# Patient Record
Sex: FEMALE | Race: WHITE | NOT HISPANIC OR LATINO | Employment: FULL TIME | URBAN - METROPOLITAN AREA
[De-identification: names, ages, dates, MRNs, and addresses within clinical notes are randomized per-mention and may not be internally consistent; named-entity substitution may affect disease eponyms.]

---

## 2020-06-18 LAB
B-HCG UR QL: NEGATIVE
CTP QC/QA: YES

## 2020-06-18 PROCEDURE — 81025 URINE PREGNANCY TEST: CPT | Performed by: EMERGENCY MEDICINE

## 2020-06-18 PROCEDURE — 99285 EMERGENCY DEPT VISIT HI MDM: CPT

## 2020-06-19 ENCOUNTER — HOSPITAL ENCOUNTER (EMERGENCY)
Facility: HOSPITAL | Age: 35
Discharge: HOME | End: 2020-06-19
Attending: EMERGENCY MEDICINE
Payer: MEDICAID

## 2020-06-19 VITALS
BODY MASS INDEX: 32.14 KG/M2 | TEMPERATURE: 98 F | RESPIRATION RATE: 15 BRPM | DIASTOLIC BLOOD PRESSURE: 63 MMHG | WEIGHT: 200 LBS | OXYGEN SATURATION: 98 % | SYSTOLIC BLOOD PRESSURE: 99 MMHG | HEART RATE: 65 BPM | HEIGHT: 66 IN

## 2020-06-19 DIAGNOSIS — M25.511 ACUTE PAIN OF RIGHT SHOULDER: ICD-10-CM

## 2020-06-19 DIAGNOSIS — R52 PAIN: ICD-10-CM

## 2020-06-19 DIAGNOSIS — R07.9 CHEST PAIN: ICD-10-CM

## 2020-06-19 DIAGNOSIS — R07.9 CHEST PAIN, UNSPECIFIED TYPE: Primary | ICD-10-CM

## 2020-06-19 LAB
ALBUMIN SERPL BCP-MCNC: 3.9 G/DL (ref 3.5–5.2)
ALP SERPL-CCNC: 88 U/L (ref 55–135)
ALT SERPL W/O P-5'-P-CCNC: 22 U/L (ref 10–44)
ANION GAP SERPL CALC-SCNC: 11 MMOL/L (ref 8–16)
AST SERPL-CCNC: 23 U/L (ref 10–40)
BASOPHILS # BLD AUTO: 0.1 K/UL (ref 0–0.2)
BASOPHILS NFR BLD: 0.9 % (ref 0–1.9)
BILIRUB SERPL-MCNC: 0.4 MG/DL (ref 0.1–1)
BNP SERPL-MCNC: 17 PG/ML (ref 0–99)
BUN SERPL-MCNC: 16 MG/DL (ref 6–20)
CALCIUM SERPL-MCNC: 9.3 MG/DL (ref 8.7–10.5)
CHLORIDE SERPL-SCNC: 104 MMOL/L (ref 95–110)
CO2 SERPL-SCNC: 26 MMOL/L (ref 23–29)
CREAT SERPL-MCNC: 0.7 MG/DL (ref 0.5–1.4)
DIFFERENTIAL METHOD: ABNORMAL
EOSINOPHIL # BLD AUTO: 0.1 K/UL (ref 0–0.5)
EOSINOPHIL NFR BLD: 1 % (ref 0–8)
ERYTHROCYTE [DISTWIDTH] IN BLOOD BY AUTOMATED COUNT: 12.4 % (ref 11.5–14.5)
EST. GFR  (AFRICAN AMERICAN): >60 ML/MIN/1.73 M^2
EST. GFR  (NON AFRICAN AMERICAN): >60 ML/MIN/1.73 M^2
GLUCOSE SERPL-MCNC: 94 MG/DL (ref 70–110)
HCT VFR BLD AUTO: 41 % (ref 37–48.5)
HGB BLD-MCNC: 13.4 G/DL (ref 12–16)
IMM GRANULOCYTES # BLD AUTO: 0.05 K/UL (ref 0–0.04)
IMM GRANULOCYTES NFR BLD AUTO: 0.4 % (ref 0–0.5)
LYMPHOCYTES # BLD AUTO: 3 K/UL (ref 1–4.8)
LYMPHOCYTES NFR BLD: 25.6 % (ref 18–48)
MCH RBC QN AUTO: 31.5 PG (ref 27–31)
MCHC RBC AUTO-ENTMCNC: 32.7 G/DL (ref 32–36)
MCV RBC AUTO: 96 FL (ref 82–98)
MONOCYTES # BLD AUTO: 0.9 K/UL (ref 0.3–1)
MONOCYTES NFR BLD: 7.7 % (ref 4–15)
NEUTROPHILS # BLD AUTO: 7.5 K/UL (ref 1.8–7.7)
NEUTROPHILS NFR BLD: 64.4 % (ref 38–73)
NRBC BLD-RTO: 0 /100 WBC
PLATELET # BLD AUTO: 298 K/UL (ref 150–350)
PMV BLD AUTO: 10.4 FL (ref 9.2–12.9)
POTASSIUM SERPL-SCNC: 3.8 MMOL/L (ref 3.5–5.1)
PROT SERPL-MCNC: 7.9 G/DL (ref 6–8.4)
RBC # BLD AUTO: 4.26 M/UL (ref 4–5.4)
SODIUM SERPL-SCNC: 141 MMOL/L (ref 136–145)
TROPONIN I SERPL DL<=0.01 NG/ML-MCNC: <0.03 NG/ML
WBC # BLD AUTO: 11.57 K/UL (ref 3.9–12.7)

## 2020-06-19 PROCEDURE — 83880 ASSAY OF NATRIURETIC PEPTIDE: CPT

## 2020-06-19 PROCEDURE — 85025 COMPLETE CBC W/AUTO DIFF WBC: CPT

## 2020-06-19 PROCEDURE — 25000003 PHARM REV CODE 250: Performed by: EMERGENCY MEDICINE

## 2020-06-19 PROCEDURE — 84484 ASSAY OF TROPONIN QUANT: CPT

## 2020-06-19 PROCEDURE — 80053 COMPREHEN METABOLIC PANEL: CPT

## 2020-06-19 PROCEDURE — 93005 ELECTROCARDIOGRAM TRACING: CPT | Performed by: INTERNAL MEDICINE

## 2020-06-19 RX ORDER — METHOCARBAMOL 500 MG/1
1000 TABLET, FILM COATED ORAL
Status: COMPLETED | OUTPATIENT
Start: 2020-06-19 | End: 2020-06-19

## 2020-06-19 RX ORDER — ASPIRIN 325 MG
325 TABLET ORAL
Status: COMPLETED | OUTPATIENT
Start: 2020-06-19 | End: 2020-06-19

## 2020-06-19 RX ORDER — NAPROXEN 500 MG/1
500 TABLET ORAL 2 TIMES DAILY WITH MEALS
Qty: 30 TABLET | Refills: 0 | Status: SHIPPED | OUTPATIENT
Start: 2020-06-19 | End: 2020-12-09 | Stop reason: CLARIF

## 2020-06-19 RX ORDER — METHOCARBAMOL 500 MG/1
1000 TABLET, FILM COATED ORAL 3 TIMES DAILY
Qty: 30 TABLET | Refills: 0 | Status: SHIPPED | OUTPATIENT
Start: 2020-06-19 | End: 2020-06-24

## 2020-06-19 RX ADMIN — METHOCARBAMOL 1000 MG: 500 TABLET ORAL at 01:06

## 2020-06-19 RX ADMIN — ASPIRIN 325 MG ORAL TABLET 325 MG: 325 PILL ORAL at 01:06

## 2020-06-19 RX ADMIN — IBUPROFEN 600 MG: 200 TABLET, FILM COATED ORAL at 01:06

## 2020-06-19 NOTE — ED PROVIDER NOTES
"Encounter Date: 6/18/2020       History     Chief Complaint   Patient presents with    Chest Pain     "right upper chest pain / right shoulder pain" "hurting all day"     Shoulder Pain     35-year-old female presented emergency department with right shoulder pain and right-sided chest wall pain worse with movement which started this morning and has been hurting all day.  Patient's pain is reproducible.  Patient denies any fever chills or shortness of breath or abdominal pain or weakness or numbness.  Patient complains of right-sided chest wall along with right shoulder pain worse with movement and with palpation.  Patient rates his pain 6/10.  Patient denies any trauma.  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        Review of patient's allergies indicates:   Allergen Reactions    Adhesive     Bactrim [sulfamethoxazole-trimethoprim]      No past medical history on file.  No past surgical history on file.  No family history on file.  Social History     Tobacco Use    Smoking status: Not on file   Substance Use Topics    Alcohol use: Not on file    Drug use: Not on file     Review of Systems   Constitutional: Negative.    HENT: Negative.    Eyes: Negative.    Respiratory: Negative.    Cardiovascular: Positive for chest pain.   Gastrointestinal: Negative.    Endocrine: Negative.    Genitourinary: Negative.    Musculoskeletal: Positive for arthralgias.   Skin: Negative.    Allergic/Immunologic: Negative.    Neurological: Negative.    Hematological: Negative.    Psychiatric/Behavioral: Negative.    All other systems reviewed and are negative.      Physical Exam     Initial Vitals [06/18/20 2202]   BP Pulse Resp Temp SpO2   121/70 80 14 98.7 °F (37.1 °C) 99 %      MAP       --         Physical Exam    Nursing note and vitals reviewed.  Constitutional: She appears well-developed and well-nourished.   HENT:   Head: Normocephalic and atraumatic.   Nose: Nose normal.   Mouth/Throat: " Oropharynx is clear and moist.   Eyes: Conjunctivae and EOM are normal. Pupils are equal, round, and reactive to light.   Neck: Normal range of motion. Neck supple. No thyromegaly present. No tracheal deviation present. No JVD present.   Cardiovascular: Normal rate, regular rhythm, normal heart sounds and intact distal pulses.   No murmur heard.  Pulmonary/Chest: Breath sounds normal. No stridor. No respiratory distress. She has no wheezes. She has no rales. She exhibits tenderness.   Abdominal: Soft. Bowel sounds are normal.   Musculoskeletal: Normal range of motion. Tenderness present. No edema.      Comments: Right shoulder and right chest wall tenderness and pain reproduced with movement of right shoulder.  Extremities neurovascularly intact   Neurological: She is alert and oriented to person, place, and time. She has normal strength. No cranial nerve deficit. GCS score is 15. GCS eye subscore is 4. GCS verbal subscore is 5. GCS motor subscore is 6.   Skin: Skin is warm. Capillary refill takes less than 2 seconds.   Psychiatric: She has a normal mood and affect. Thought content normal.         ED Course   Procedures  Labs Reviewed   CBC W/ AUTO DIFFERENTIAL - Abnormal; Notable for the following components:       Result Value    Mean Corpuscular Hemoglobin 31.5 (*)     Immature Grans (Abs) 0.05 (*)     All other components within normal limits   COMPREHENSIVE METABOLIC PANEL   TROPONIN I   B-TYPE NATRIURETIC PEPTIDE   TROPONIN I   POCT URINE PREGNANCY     EKG Readings: (Independently Interpreted)   Initial Reading: No STEMI. Rhythm: Normal Sinus Rhythm. Ectopy: No Ectopy. Conduction: Normal.       Imaging Results          X-Ray Shoulder Complete 2 View Right (In process)                X-Ray Chest PA And Lateral (In process)               X-Rays:   Independently Interpreted Readings:   Other Readings:  Chest x-ray and right shoulder x-ray unremarkable    Medical Decision Making:   Differential Diagnosis:    Patient presented emergency department with right shoulder and right chest wall pain and tenderness and pain reproduced with movement.  X-ray unremarkable and screening cardiac workup negative and patient's pain consistent with musculoskeletal pain.  Patient treated with anti-inflammatory and muscle relaxant and pain improved and discharged with instructions and follow up with primary care  Clinical Tests:   Lab Tests: Reviewed  Radiological Study: Reviewed  Medical Tests: Reviewed                                 Clinical Impression:       ICD-10-CM ICD-9-CM   1. Chest pain, unspecified type  R07.9 786.50   2. Chest pain  R07.9 786.50   3. Pain  R52 780.96   4. Acute pain of right shoulder  M25.511 719.41                                Renny Oglesby MD  06/19/20 0146

## 2020-06-19 NOTE — ED NOTES
Given Naprosyn and Robaxin RX for pain.    Feeling better after meds, pain 4/10.  Amb out of ER in stable condition.  Gait steady

## 2020-06-19 NOTE — ED NOTES
Patient identifiers for Lindsay Sneed checked and correct.  LOC:  Lindsay Sneed is awake, alert, and aware of environment with an appropriate affect. She is oriented x 3 and speaking appropriately.  APPEARANCE:  She is resting comfortably and in no acute distress. She is clean and well groomed, patient's clothing is properly fastened.  SKIN:  The skin is warm and dry. She has normal skin turgor and moist mucus membranes. Skin is intact; no bruising or breakdown noted.  MUSCULOSKELETAL:  She is moving all extremities well, no obvious deformities noted. Pulses intact.   C/o pain to right shoulder and right anterior chest wall.  Pain worsens with movement.  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 Respirations are spontaneous and non-labored with normal effort and rate.  CARDIAC:  She has a normal rate and rhythm. No peripheral edema noted. Capillary refill < 3 seconds.  ABDOMEN:  No distention noted.  Soft and non-tender upon palpation.  NEUROLOGICAL:  PERRL. Facial expression is symmetrical. Hand grasps are equal bilaterally. Normal sensation in all extremities when touched with finger.  Allergies reported:    Review of patient's allergies indicates:   Allergen Reactions    Adhesive     Bactrim [sulfamethoxazole-trimethoprim]      OTHER NOTES:

## 2020-06-19 NOTE — Clinical Note
Lindsay Sneed was seen and treated in our emergency department on 6/18/2020.  She may return to work on 06/20/2020.  Light duty.  No heavy weightlifting.     If you have any questions or concerns, please don't hesitate to call.      Renny Oglesby MD

## 2020-12-09 ENCOUNTER — HOSPITAL ENCOUNTER (EMERGENCY)
Facility: HOSPITAL | Age: 35
Discharge: HOME | End: 2020-12-09
Payer: COMMERCIAL

## 2020-12-09 VITALS
WEIGHT: 190 LBS | HEART RATE: 87 BPM | RESPIRATION RATE: 16 BRPM | SYSTOLIC BLOOD PRESSURE: 124 MMHG | BODY MASS INDEX: 30.53 KG/M2 | TEMPERATURE: 98 F | DIASTOLIC BLOOD PRESSURE: 71 MMHG | HEIGHT: 66 IN | OXYGEN SATURATION: 100 %

## 2020-12-09 DIAGNOSIS — J06.9 VIRAL URI: Primary | ICD-10-CM

## 2020-12-09 LAB — GROUP A STREP, MOLECULAR: NEGATIVE

## 2020-12-09 PROCEDURE — 99283 EMERGENCY DEPT VISIT LOW MDM: CPT

## 2020-12-09 PROCEDURE — 87651 STREP A DNA AMP PROBE: CPT

## 2020-12-09 PROCEDURE — U0003 INFECTIOUS AGENT DETECTION BY NUCLEIC ACID (DNA OR RNA); SEVERE ACUTE RESPIRATORY SYNDROME CORONAVIRUS 2 (SARS-COV-2) (CORONAVIRUS DISEASE [COVID-19]), AMPLIFIED PROBE TECHNIQUE, MAKING USE OF HIGH THROUGHPUT TECHNOLOGIES AS DESCRIBED BY CMS-2020-01-R: HCPCS

## 2020-12-09 RX ORDER — BENZONATATE 100 MG/1
100 CAPSULE ORAL 3 TIMES DAILY PRN
Qty: 20 CAPSULE | Refills: 0 | Status: SHIPPED | OUTPATIENT
Start: 2020-12-09 | End: 2020-12-19

## 2020-12-10 LAB — SARS-COV-2 RNA RESP QL NAA+PROBE: NOT DETECTED

## 2020-12-10 NOTE — DISCHARGE INSTRUCTIONS
Instructions for Patients with Confirmed or Suspected COVID-19    If you are awaiting your test result, you will either be called or it will be released to the patient portal.  If you have any questions about your test, please visit [email or web address] or call our COVID-19 information line at [phone].      Instructions for non-hospitalized or discharged patients with confirmed or suspected COVID-19:      Stay home except to get medical care.   Separate yourself from other people and animals in your home.   Call ahead before visiting your doctor.   Wear a face mask.   Cover your coughs and sneezes.   Clean your hands often.   Avoid sharing personal household items.   Clean all high-touch surfaces every day.   Monitor your symptoms. Seek prompt medical attention if your illness is worsening (e.g., difficulty breathing). Before seeking care, call your healthcare provider.   If you have a medical emergency and must call 911, notify the dispatcher that you have or are being evaluated for COVID-19. If possible, put on a face mask before emergency medical services arrive.   Use the following symptom-based strategy to return to normal activity following a suspected or confirmed case of COVID-19. Continue isolation until:   At least 3 days (72 hours) have passed since recovery defined as resolution of fever without the use of fever-reducing medications and improvement in respiratory symptoms (e.g. cough, shortness of breath), and   At least 10 days have passed since the first positive test.       As one of the next steps, you will receive a call or text from the [place] Department of Health ([facility]) COVID-19 Tracing Team. See the contact information below so you know not to ignore the health departments call. It is important that you contact them back immediately so they can help.     Contact Tracer Number:  [phone]  Caller ID for most carriers: [place] Dept Health    What is contact tracing?  Contact  tracing is a process that helps identify everyone who has been in close contact with an infected person. Contact tracers let those people know they may have been exposed and guide them on next steps. Confidentiality is important for everyone; no one will be told who may have exposed them to the virus.  Your involvement is important. The more we know about where and how this virus is spreading, the better chance we have at stopping it from spreading further.  What does exposure mean?  Exposure means you have been within 6 feet for more than 15 minutes with a person who has or had COVID-19.  What kind of questions do the contact tracers ask?  A contact tracer will confirm your basic contact information including name, address, phone number, and next of kin, as well as asking about any symptoms you may have had. Theyll also ask you how you think you may have gotten sick, such as places where you may have been exposed to the virus, and people you were with. Those names will never be shared with anyone outside of that call, and will only be used to help trace and stop the spread of the virus.   I have privacy concerns. How will the state use my information?  Your privacy about your health is important. All calls are completed using call centers that use the appropriate health privacy protection measures (HIPAA compliance), meaning that your patient information is safe. No one will ever ask you any questions related to immigration status. Your health comes first.   Do I have to participate?  You do not have to participate, but we strongly encourage you to. Contact tracing can help us catch and control new outbreaks as theyre developing to keep your friends and family safe.   What if I dont hear from anyone?  If you dont receive a call within 24 hours, you can call the number above right away to inquire about your status. That line is open from 8:00 am - 8:00 p.m., 7 days a week.  Contact tracing saves lives! Together,  we have the power to beat this virus and keep our loved ones and neighbors safe.       Instructions for household members, intimate partners and caregivers in a non-healthcare setting of a patient with confirmed or suspected COVID-19:        Close contacts should monitor their health and call their healthcare provider right away if they develop symptoms suggestive of COVID-19 (e.g., fever, cough, shortness of breath).   Stay home except to get medical care. Separate yourself from other people and animals in the home.  Monitor the patients symptoms. If the patient is getting sicker, call his or her healthcare provider. If the patient has a medical emergency and you need to call 911, notify the dispatch personnel that the patient has or is being evaluated for COVID-19.   Wear a facemask when around other people such as sharing a room or vehicle and before entering a healthcare provider's office.  Cover coughs and sneezes with a tissue. Throw used tissues in a lined trash can immediately and wash hands.  Clean hands often with soap and water for at least 20 seconds or with an alcohol-based hand , rubbing hands together until they feel dry. Avoid touching your eyes, nose, and mouth with unwashed hands.  Clean all high-touch; surfaces every day, including counters, tabletops, doorknobs, bathroom fixtures, toilets, phones, keyboards, tablets, bedside tables, etc. Use a household cleaning spray or wipe according to label instructions.  Avoid sharing personal household items such as dishes, drinking glasses, cups, towels, bedding, etc. After these items are used, they should be washed thoroughly with soap and water.  Continue isolation until:  At least 3 days (72 hours) have passed since recovery defined as resolution of fever without the use of fever-reducing medications and improvement in respiratory symptoms (e.g. cough, shortness of breath), and   At least 10 days have passed since the patients first  positive test.    https://www.cdc.gov/coronavirus/2019-ncov/your-health/index.htm

## 2020-12-10 NOTE — ED NOTES
Lindsay Sneed presents to the ED with c/o sore throat, fatigue, decreased appetite, loss of taste, chills, and congestion x 6 days.   Mucous membranes are pink and moist. Skin is warm, dry and intact. Lungs are clear bilaterally, respirations are regular and unlabored. Denies SOB, cough, or rhinorrhea. BS active x4, no tenderness with palpation, abd is soft and not distended.  S1S2, capillary refill is < 2 seconds. Denies dysuria, difficulty urinating, frequency, numbness, tingling or weakness. BEN WHALEY

## 2020-12-10 NOTE — ED PROVIDER NOTES
Encounter Date: 12/9/2020    SCRIBE #1 NOTE: I, Kat Greenwood, am scribing for, and in the presence of, ELIZABET Augustin.       History     Chief Complaint   Patient presents with    Shortness of Breath     with sore throat, started Thursday       Time seen by provider: 6:40 PM on 12/09/2020    Lindsay Sneed is a 35 y.o. female who presents for an evaluation of possible COVID-19.  She complains of fatigue, intermittent chills, and sore throat. The patient states her symptoms began 6 days with with a sore throat and has had no known positive exposure. She denies being pregnant, taking daily medications, fever, SOB, or any other symptoms at this time. No pulmonary PMHx or PMHx.    The history is provided by the patient.     Review of patient's allergies indicates:   Allergen Reactions    Adhesive     Bactrim [sulfamethoxazole-trimethoprim]      History reviewed. No pertinent past medical history.  History reviewed. No pertinent surgical history.  History reviewed. No pertinent family history.  Social History     Tobacco Use    Smoking status: Not on file   Substance Use Topics    Alcohol use: Not on file    Drug use: Not on file     Review of Systems   Constitutional: Positive for chills and fatigue. Negative for activity change, appetite change and fever.   HENT: Positive for sore throat. Negative for congestion and rhinorrhea.    Respiratory: Negative for cough, chest tightness, shortness of breath and wheezing.    Cardiovascular: Negative for chest pain and palpitations.   Gastrointestinal: Negative for diarrhea, nausea and vomiting.   Musculoskeletal: Negative for arthralgias and myalgias.   Skin: Negative for rash.   Neurological: Negative for weakness, light-headedness, numbness and headaches.     Physical Exam     Initial Vitals [12/09/20 1819]   BP Pulse Resp Temp SpO2   124/71 87 16 97.9 °F (36.6 °C) 100 %      MAP       --         Physical Exam    Nursing note and vitals reviewed.  Constitutional:  She appears well-developed and well-nourished. She is cooperative.  Non-toxic appearance. She does not have a sickly appearance.   HENT:   Head: Normocephalic and atraumatic.   Right Ear: Tympanic membrane and external ear normal.   Left Ear: Tympanic membrane and external ear normal.   Nose: Nose normal.   Mouth/Throat: Uvula is midline, oropharynx is clear and moist and mucous membranes are normal. No posterior oropharyngeal edema or posterior oropharyngeal erythema.   Normal phonation.  Tolerating secretions.   Eyes: Conjunctivae and lids are normal.   Neck: Normal range of motion and full passive range of motion without pain. Neck supple.   Cardiovascular: Normal rate, regular rhythm and normal heart sounds. Exam reveals no gallop and no friction rub.    No murmur heard.  Pulmonary/Chest: Breath sounds normal. She has no wheezes. She has no rhonchi. She has no rales.   Abdominal: Normal appearance.   Neurological: She is alert.   Skin: Skin is warm, dry and intact. No rash noted.       ED Course   Procedures  Labs Reviewed   GROUP A STREP, MOLECULAR   SARS-COV-2 (COVID-19) QUALITATIVE PCR        Imaging Results    None          Medical Decision Making:   History:   Old Medical Records: I decided to obtain old medical records.  Differential Diagnosis:   Viral URI  Strep pharyngitis  Sinusitis   Clinical Tests:   Lab Tests: Reviewed and Ordered       APC / Resident Notes:   Patient is a 35 y.o. female who presents to the ED 12/10/2020 w who underwent emergent evaluation for fatigue, chills, and sore throat.  Multiple sick contacts in the home with similar symptoms.  Patient's chief concern is COVID-19 and is requesting testing.  COVID-19 test pending.  Rapid strep test negative.  I do not think strep pharyngitis. The patient appears to have a viral upper respiratory infection.  Based upon the history and physical exam the patient does not appear to have a serious bacterial infection such as pneumonia, sepsis,  otitis media, epiglottitis, bacterial sinusitis, strep pharyngitis, parapharyngeal or peritonsillar abscess, meningitis.  Patient appears very well and I have given specific return precautions to the patient and/or family members.  The patient can take over the counter medications and does not appear to need antibiotics at this time.               Scribe Attestation:   Scribe #1: I performed the above scribed service and the documentation accurately describes the services I performed. I attest to the accuracy of the note.    Attending Attestation:           Physician Attestation for Scribe:  Physician Attestation Statement for Scribe #1: I, Talita Taveras, reviewed documentation, as scribed by in my presence, and it is both accurate and complete.     Comments: I, Talita Taveras NP-C, personally performed the services described in this documentation. All medical record entries made by the scribe were at my direction and in my presence.  I have reviewed the chart and agree that the record reflects my personal performance and is accurate and complete. ELIZABET Augustin.  8:33 AM 12/10/2020                    Clinical Impression:     ICD-10-CM ICD-9-CM   1. Viral URI  J06.9 465.9                      Disposition:   Disposition: Discharged  Condition: Stable     ED Disposition Condition    Discharge Stable        ED Prescriptions     None        Follow-up Information     Follow up With Specialties Details Why Contact [facility]  In 1 week  [address] [place] [id number]  [phone]      [facility] Emergency Medicine  As needed, If symptoms worsen [address] [place] [id number] [phone]                                       Talita Taveras NP  12/10/20 0807

## 2020-12-10 NOTE — ED NOTES
Upon discharge, patient is AAOx4, no cardiac or respiratory complications. Follow up care and  Medications have been reviewed with patient and has been instructed to return to the ER if needed. Patient verbalized understanding and ambulated to the lobby without difficulty. JUDD CONTRERAS.

## 2020-12-15 ENCOUNTER — PATIENT OUTREACH (OUTPATIENT)
Dept: EMERGENCY MEDICINE | Facility: HOSPITAL | Age: 35
End: 2020-12-15

## 2020-12-22 NOTE — PROGRESS NOTES
Explained services to Patient and she declined to complete assessment.  ED Navigator to close encounter at this time.

## 2021-10-11 ENCOUNTER — HOSPITAL ENCOUNTER (EMERGENCY)
Facility: HOSPITAL | Age: 36
Discharge: HOME | End: 2021-10-11
Attending: EMERGENCY MEDICINE
Payer: COMMERCIAL

## 2021-10-11 VITALS
DIASTOLIC BLOOD PRESSURE: 67 MMHG | OXYGEN SATURATION: 99 % | HEART RATE: 83 BPM | WEIGHT: 190 LBS | SYSTOLIC BLOOD PRESSURE: 112 MMHG | HEIGHT: 66 IN | TEMPERATURE: 98 F | RESPIRATION RATE: 18 BRPM | BODY MASS INDEX: 30.53 KG/M2

## 2021-10-11 DIAGNOSIS — R11.2 NON-INTRACTABLE VOMITING WITH NAUSEA, UNSPECIFIED VOMITING TYPE: Primary | ICD-10-CM

## 2021-10-11 DIAGNOSIS — R11.0 NAUSEA: ICD-10-CM

## 2021-10-11 LAB
ALBUMIN SERPL BCP-MCNC: 3.8 G/DL (ref 3.5–5.2)
ALP SERPL-CCNC: 89 U/L (ref 55–135)
ALT SERPL W/O P-5'-P-CCNC: 15 U/L (ref 10–44)
ANION GAP SERPL CALC-SCNC: 9 MMOL/L (ref 8–16)
AST SERPL-CCNC: 15 U/L (ref 10–40)
BASOPHILS # BLD AUTO: 0.09 K/UL (ref 0–0.2)
BASOPHILS NFR BLD: 1.2 % (ref 0–1.9)
BILIRUB SERPL-MCNC: 0.4 MG/DL (ref 0.1–1)
BILIRUB UR QL STRIP: NEGATIVE
BUN SERPL-MCNC: 12 MG/DL (ref 6–20)
CALCIUM SERPL-MCNC: 9.4 MG/DL (ref 8.7–10.5)
CHLORIDE SERPL-SCNC: 107 MMOL/L (ref 95–110)
CLARITY UR: CLEAR
CO2 SERPL-SCNC: 24 MMOL/L (ref 23–29)
COLOR UR: YELLOW
CREAT SERPL-MCNC: 0.8 MG/DL (ref 0.5–1.4)
DIFFERENTIAL METHOD: ABNORMAL
EOSINOPHIL # BLD AUTO: 0.1 K/UL (ref 0–0.5)
EOSINOPHIL NFR BLD: 0.7 % (ref 0–8)
ERYTHROCYTE [DISTWIDTH] IN BLOOD BY AUTOMATED COUNT: 11.9 % (ref 11.5–14.5)
EST. GFR  (AFRICAN AMERICAN): >60 ML/MIN/1.73 M^2
EST. GFR  (NON AFRICAN AMERICAN): >60 ML/MIN/1.73 M^2
GLUCOSE SERPL-MCNC: 94 MG/DL (ref 70–110)
GLUCOSE UR QL STRIP: NEGATIVE
HCT VFR BLD AUTO: 43.5 % (ref 37–48.5)
HGB BLD-MCNC: 14.2 G/DL (ref 12–16)
HGB UR QL STRIP: ABNORMAL
IMM GRANULOCYTES # BLD AUTO: 0.03 K/UL (ref 0–0.04)
IMM GRANULOCYTES NFR BLD AUTO: 0.4 % (ref 0–0.5)
KETONES UR QL STRIP: NEGATIVE
LEUKOCYTE ESTERASE UR QL STRIP: NEGATIVE
LIPASE SERPL-CCNC: 18 U/L (ref 4–60)
LYMPHOCYTES # BLD AUTO: 1.6 K/UL (ref 1–4.8)
LYMPHOCYTES NFR BLD: 21.1 % (ref 18–48)
MCH RBC QN AUTO: 31.1 PG (ref 27–31)
MCHC RBC AUTO-ENTMCNC: 32.6 G/DL (ref 32–36)
MCV RBC AUTO: 95 FL (ref 82–98)
MONOCYTES # BLD AUTO: 0.5 K/UL (ref 0.3–1)
MONOCYTES NFR BLD: 6.6 % (ref 4–15)
NEUTROPHILS # BLD AUTO: 5.3 K/UL (ref 1.8–7.7)
NEUTROPHILS NFR BLD: 70 % (ref 38–73)
NITRITE UR QL STRIP: NEGATIVE
NRBC BLD-RTO: 0 /100 WBC
PH UR STRIP: 6 [PH] (ref 5–8)
PLATELET # BLD AUTO: 281 K/UL (ref 150–450)
PMV BLD AUTO: 10.4 FL (ref 9.2–12.9)
POTASSIUM SERPL-SCNC: 3.7 MMOL/L (ref 3.5–5.1)
PROT SERPL-MCNC: 7.5 G/DL (ref 6–8.4)
PROT UR QL STRIP: NEGATIVE
RBC # BLD AUTO: 4.56 M/UL (ref 4–5.4)
SODIUM SERPL-SCNC: 140 MMOL/L (ref 136–145)
SP GR UR STRIP: >=1.03 (ref 1–1.03)
TROPONIN I SERPL DL<=0.01 NG/ML-MCNC: <0.006 NG/ML (ref 0–0.03)
URN SPEC COLLECT METH UR: ABNORMAL
UROBILINOGEN UR STRIP-ACNC: NEGATIVE EU/DL
WBC # BLD AUTO: 7.58 K/UL (ref 3.9–12.7)

## 2021-10-11 PROCEDURE — 93010 ELECTROCARDIOGRAM REPORT: CPT | Mod: ,,, | Performed by: GENERAL PRACTICE

## 2021-10-11 PROCEDURE — 36415 COLL VENOUS BLD VENIPUNCTURE: CPT | Performed by: PHYSICIAN ASSISTANT

## 2021-10-11 PROCEDURE — 96374 THER/PROPH/DIAG INJ IV PUSH: CPT

## 2021-10-11 PROCEDURE — 63600175 PHARM REV CODE 636 W HCPCS: Performed by: PHYSICIAN ASSISTANT

## 2021-10-11 PROCEDURE — 93005 ELECTROCARDIOGRAM TRACING: CPT

## 2021-10-11 PROCEDURE — 25000003 PHARM REV CODE 250: Performed by: PHYSICIAN ASSISTANT

## 2021-10-11 PROCEDURE — 93010 EKG 12-LEAD: ICD-10-PCS | Mod: ,,, | Performed by: GENERAL PRACTICE

## 2021-10-11 PROCEDURE — 99285 EMERGENCY DEPT VISIT HI MDM: CPT | Mod: 25

## 2021-10-11 PROCEDURE — 81003 URINALYSIS AUTO W/O SCOPE: CPT | Performed by: PHYSICIAN ASSISTANT

## 2021-10-11 PROCEDURE — 84484 ASSAY OF TROPONIN QUANT: CPT | Performed by: PHYSICIAN ASSISTANT

## 2021-10-11 PROCEDURE — 80053 COMPREHEN METABOLIC PANEL: CPT | Performed by: PHYSICIAN ASSISTANT

## 2021-10-11 PROCEDURE — 83690 ASSAY OF LIPASE: CPT | Performed by: PHYSICIAN ASSISTANT

## 2021-10-11 PROCEDURE — 85025 COMPLETE CBC W/AUTO DIFF WBC: CPT | Performed by: PHYSICIAN ASSISTANT

## 2021-10-11 PROCEDURE — 96361 HYDRATE IV INFUSION ADD-ON: CPT

## 2021-10-11 RX ORDER — ONDANSETRON 2 MG/ML
4 INJECTION INTRAMUSCULAR; INTRAVENOUS
Status: COMPLETED | OUTPATIENT
Start: 2021-10-11 | End: 2021-10-11

## 2021-10-11 RX ORDER — HYDROXYZINE HYDROCHLORIDE 25 MG/1
25 TABLET, FILM COATED ORAL 3 TIMES DAILY PRN
Qty: 30 TABLET | Refills: 0 | Status: SHIPPED | OUTPATIENT
Start: 2021-10-11

## 2021-10-11 RX ORDER — PROMETHAZINE HYDROCHLORIDE 25 MG/1
25 TABLET ORAL EVERY 6 HOURS PRN
Qty: 15 TABLET | Refills: 0 | Status: SHIPPED | OUTPATIENT
Start: 2021-10-11 | End: 2023-12-15 | Stop reason: ALTCHOICE

## 2021-10-11 RX ORDER — ONDANSETRON 4 MG/1
8 TABLET, ORALLY DISINTEGRATING ORAL EVERY 6 HOURS PRN
Qty: 20 TABLET | Refills: 0 | OUTPATIENT
Start: 2021-10-11 | End: 2023-12-15

## 2021-10-11 RX ADMIN — SODIUM CHLORIDE 1000 ML: 0.9 INJECTION, SOLUTION INTRAVENOUS at 10:10

## 2021-10-11 RX ADMIN — ONDANSETRON 4 MG: 2 INJECTION INTRAMUSCULAR; INTRAVENOUS at 10:10

## 2022-01-01 ENCOUNTER — HOSPITAL ENCOUNTER (EMERGENCY)
Facility: HOSPITAL | Age: 37
Discharge: HOME | End: 2022-01-01
Attending: EMERGENCY MEDICINE
Payer: COMMERCIAL

## 2022-01-01 ENCOUNTER — NURSE TRIAGE (OUTPATIENT)
Dept: ADMINISTRATIVE | Facility: CLINIC | Age: 37
End: 2022-01-01
Payer: COMMERCIAL

## 2022-01-01 VITALS
HEART RATE: 89 BPM | OXYGEN SATURATION: 100 % | TEMPERATURE: 98 F | WEIGHT: 215 LBS | RESPIRATION RATE: 18 BRPM | HEIGHT: 66 IN | BODY MASS INDEX: 34.55 KG/M2 | SYSTOLIC BLOOD PRESSURE: 113 MMHG | DIASTOLIC BLOOD PRESSURE: 68 MMHG

## 2022-01-01 DIAGNOSIS — R09.81 SINUS CONGESTION: ICD-10-CM

## 2022-01-01 DIAGNOSIS — Z20.822 CLOSE EXPOSURE TO COVID-19 VIRUS: Primary | ICD-10-CM

## 2022-01-01 DIAGNOSIS — J02.9 SORE THROAT: ICD-10-CM

## 2022-01-01 DIAGNOSIS — H92.09 OTALGIA, UNSPECIFIED LATERALITY: ICD-10-CM

## 2022-01-01 LAB
INFLUENZA A, MOLECULAR: NEGATIVE
INFLUENZA B, MOLECULAR: NEGATIVE
SARS-COV-2 RDRP RESP QL NAA+PROBE: POSITIVE
SPECIMEN SOURCE: NORMAL

## 2022-01-01 PROCEDURE — U0002 COVID-19 LAB TEST NON-CDC: HCPCS | Performed by: FAMILY MEDICINE

## 2022-01-01 PROCEDURE — 87502 INFLUENZA DNA AMP PROBE: CPT | Performed by: FAMILY MEDICINE

## 2022-01-01 PROCEDURE — 99281 EMR DPT VST MAYX REQ PHY/QHP: CPT | Mod: S$GLB,,, | Performed by: FAMILY MEDICINE

## 2022-01-01 PROCEDURE — 99281 PR EMERGENCY DEPT VISIT,LEVEL I: ICD-10-PCS | Mod: S$GLB,,, | Performed by: FAMILY MEDICINE

## 2022-01-01 PROCEDURE — 99283 EMERGENCY DEPT VISIT LOW MDM: CPT

## 2022-01-01 NOTE — ED PROVIDER NOTES
Encounter Date: 1/1/2022       History     Chief Complaint   Patient presents with    COVID-19 Concerns     This is an evaluation of a 36 y.o. female that presents to the Emergency Department for nonproductive cough, nasal congestion, ear pain bilateral, sore throat, fever, chills and shortness of breath for 2 days.         Review of patient's allergies indicates:   Allergen Reactions    Adhesive     Bactrim [sulfamethoxazole-trimethoprim]      No past medical history on file.  No past surgical history on file.  No family history on file.     Review of Systems   Constitutional: Positive for chills, fatigue and fever. Negative for activity change, appetite change and diaphoresis.   HENT: Positive for congestion, ear pain, postnasal drip, rhinorrhea and sore throat. Negative for ear discharge, sinus pressure, sinus pain and tinnitus.    Respiratory: Positive for cough and shortness of breath. Negative for wheezing.    Gastrointestinal: Positive for nausea and vomiting.       Physical Exam     Initial Vitals [01/01/22 1312]   BP Pulse Resp Temp SpO2   113/68 89 18 98.4 °F (36.9 °C) 100 %      MAP       --         Physical Exam    Constitutional: She appears well-developed and well-nourished. No distress.   HENT:   Head: Normocephalic.   Right Ear: External ear and ear canal normal. Tympanic membrane is bulging. No middle ear effusion.   Left Ear: External ear and ear canal normal. Tympanic membrane is bulging.  No middle ear effusion.   Nose: Nose normal.   Mouth/Throat: Uvula is midline, oropharynx is clear and moist and mucous membranes are normal. No oropharyngeal exudate or posterior oropharyngeal edema.   Eyes: Conjunctivae are normal. Pupils are equal, round, and reactive to light.   Neck: Neck supple.   Normal range of motion.  Cardiovascular: Normal rate and regular rhythm.   Pulmonary/Chest: Effort normal and breath sounds normal. No respiratory distress. She has no decreased breath sounds. She has no  wheezes. She has no rhonchi. She has no rales. She exhibits no tenderness.   Musculoskeletal:         General: Normal range of motion.      Cervical back: Normal range of motion and neck supple.     Neurological: She is alert and oriented to person, place, and time.   Skin: Skin is warm and dry. Capillary refill takes less than 2 seconds.   Psychiatric: She has a normal mood and affect.         ED Course   Procedures  Labs Reviewed   INFLUENZA A & B BY MOLECULAR   SARS-COV-2 RNA AMPLIFICATION, QUAL          Imaging Results    None          Medications - No data to display  Medical Decision Making:   Initial Assessment:   My overall impression is Viral URI, suspicion for covid-19. I considered, but at this time, do not suspect OM, OE, strep pharyngitis, meningitis, pneumonia, or acute bacterial sinusitis.  Differential Diagnosis:   COVID, URI, FLU, OM, Strep, pneumonia  Clinical Tests:   Lab Tests: Ordered  ED Management:  The diagnosis, treatment plan, instructions for follow-up and reevaluation with referrals below as well as ED return precautions were discussed and understanding was verbalized. All questions or concerns have been addressed. The patient has been given appropriate covid-19 precautions and information per avs.                               Clinical Impression:   Final diagnoses:  [Z20.822] Close exposure to COVID-19 virus (Primary)  [H92.09] Otalgia, unspecified laterality  [R09.81] Sinus congestion  [J02.9] Sore throat          ED Disposition Condition    Discharge Stable        ED Prescriptions     None        Follow-up Information    None          Mary Ugalde NP  01/01/22 1477

## 2022-01-01 NOTE — TELEPHONE ENCOUNTER
"Pt upset, states she tested + for covid today & would like to know how since she is fully vaccinated. Pt informed vaccine does not mean you cannot catch virus, vaccine helps to minimize s/s if caught. Pt states she should have never been stuck then, "ok thank you have a good day", & call disconncected.     Reason for Disposition   General information question, no triage required and triager able to answer question    Protocols used: INFORMATION ONLY CALL - NO TRIAGE-A-AH      "

## 2022-01-18 ENCOUNTER — HOSPITAL ENCOUNTER (EMERGENCY)
Facility: HOSPITAL | Age: 37
Discharge: HOME | End: 2022-01-18
Attending: EMERGENCY MEDICINE
Payer: COMMERCIAL

## 2022-01-18 VITALS
SYSTOLIC BLOOD PRESSURE: 108 MMHG | RESPIRATION RATE: 18 BRPM | TEMPERATURE: 98 F | OXYGEN SATURATION: 100 % | DIASTOLIC BLOOD PRESSURE: 68 MMHG | WEIGHT: 215 LBS | BODY MASS INDEX: 34.55 KG/M2 | HEART RATE: 87 BPM | HEIGHT: 66 IN

## 2022-01-18 DIAGNOSIS — E86.0 DEHYDRATION: Primary | ICD-10-CM
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
## 2022-01-18 DIAGNOSIS — U07.1 COVID: ICD-10-CM

## 2022-01-18 LAB
ALBUMIN SERPL BCP-MCNC: 3.9 G/DL (ref 3.5–5.2)
ALP SERPL-CCNC: 89 U/L (ref 55–135)
ALT SERPL W/O P-5'-P-CCNC: 23 U/L (ref 10–44)
ANION GAP SERPL CALC-SCNC: 15 MMOL/L (ref 8–16)
AST SERPL-CCNC: 18 U/L (ref 10–40)
BASOPHILS # BLD AUTO: 0.05 K/UL (ref 0–0.2)
BASOPHILS NFR BLD: 0.6 % (ref 0–1.9)
BILIRUB SERPL-MCNC: 1 MG/DL (ref 0.1–1)
BUN SERPL-MCNC: 11 MG/DL (ref 6–20)
CALCIUM SERPL-MCNC: 9.7 MG/DL (ref 8.7–10.5)
CHLORIDE SERPL-SCNC: 104 MMOL/L (ref 95–110)
CO2 SERPL-SCNC: 23 MMOL/L (ref 23–29)
CREAT SERPL-MCNC: 0.8 MG/DL (ref 0.5–1.4)
DIFFERENTIAL METHOD: ABNORMAL
EOSINOPHIL # BLD AUTO: 0.1 K/UL (ref 0–0.5)
EOSINOPHIL NFR BLD: 0.6 % (ref 0–8)
ERYTHROCYTE [DISTWIDTH] IN BLOOD BY AUTOMATED COUNT: 11.9 % (ref 11.5–14.5)
EST. GFR  (AFRICAN AMERICAN): >60 ML/MIN/1.73 M^2
EST. GFR  (NON AFRICAN AMERICAN): >60 ML/MIN/1.73 M^2
GLUCOSE SERPL-MCNC: 87 MG/DL (ref 70–110)
HCT VFR BLD AUTO: 43.8 % (ref 37–48.5)
HGB BLD-MCNC: 14.5 G/DL (ref 12–16)
IMM GRANULOCYTES # BLD AUTO: 0.03 K/UL (ref 0–0.04)
IMM GRANULOCYTES NFR BLD AUTO: 0.3 % (ref 0–0.5)
LYMPHOCYTES # BLD AUTO: 1.6 K/UL (ref 1–4.8)
LYMPHOCYTES NFR BLD: 18.4 % (ref 18–48)
MCH RBC QN AUTO: 30.7 PG (ref 27–31)
MCHC RBC AUTO-ENTMCNC: 33.1 G/DL (ref 32–36)
MCV RBC AUTO: 93 FL (ref 82–98)
MONOCYTES # BLD AUTO: 0.6 K/UL (ref 0.3–1)
MONOCYTES NFR BLD: 6.7 % (ref 4–15)
NEUTROPHILS # BLD AUTO: 6.3 K/UL (ref 1.8–7.7)
NEUTROPHILS NFR BLD: 73.4 % (ref 38–73)
NRBC BLD-RTO: 0 /100 WBC
PLATELET # BLD AUTO: 326 K/UL (ref 150–450)
PMV BLD AUTO: 10.2 FL (ref 9.2–12.9)
POTASSIUM SERPL-SCNC: 3.7 MMOL/L (ref 3.5–5.1)
PROT SERPL-MCNC: 7.8 G/DL (ref 6–8.4)
RBC # BLD AUTO: 4.72 M/UL (ref 4–5.4)
SODIUM SERPL-SCNC: 142 MMOL/L (ref 136–145)
WBC # BLD AUTO: 8.64 K/UL (ref 3.9–12.7)

## 2022-01-18 PROCEDURE — 99284 EMERGENCY DEPT VISIT MOD MDM: CPT | Mod: 25

## 2022-01-18 PROCEDURE — 63600175 PHARM REV CODE 636 W HCPCS: Performed by: PHYSICIAN ASSISTANT

## 2022-01-18 PROCEDURE — 85025 COMPLETE CBC W/AUTO DIFF WBC: CPT | Performed by: PHYSICIAN ASSISTANT

## 2022-01-18 PROCEDURE — 96374 THER/PROPH/DIAG INJ IV PUSH: CPT

## 2022-01-18 PROCEDURE — 36415 COLL VENOUS BLD VENIPUNCTURE: CPT | Performed by: PHYSICIAN ASSISTANT

## 2022-01-18 PROCEDURE — 80053 COMPREHEN METABOLIC PANEL: CPT | Performed by: PHYSICIAN ASSISTANT

## 2022-01-18 PROCEDURE — 25000003 PHARM REV CODE 250: Performed by: PHYSICIAN ASSISTANT

## 2022-01-18 PROCEDURE — 96361 HYDRATE IV INFUSION ADD-ON: CPT

## 2022-01-18 RX ORDER — ONDANSETRON 4 MG/1
4 TABLET, ORALLY DISINTEGRATING ORAL EVERY 8 HOURS PRN
Qty: 15 TABLET | Refills: 0 | OUTPATIENT
Start: 2022-01-18 | End: 2023-12-15

## 2022-01-18 RX ORDER — ALBUTEROL SULFATE 90 UG/1
1-2 AEROSOL, METERED RESPIRATORY (INHALATION) EVERY 6 HOURS PRN
Qty: 18 G | Refills: 0 | Status: SHIPPED | OUTPATIENT
Start: 2022-01-18 | End: 2024-01-29

## 2022-01-18 RX ORDER — LIDOCAINE 50 MG/G
1 PATCH TOPICAL DAILY
Qty: 30 PATCH | Refills: 0 | Status: SHIPPED | OUTPATIENT
Start: 2022-01-18 | End: 2024-01-29

## 2022-01-18 RX ORDER — ONDANSETRON 2 MG/ML
4 INJECTION INTRAMUSCULAR; INTRAVENOUS
Status: COMPLETED | OUTPATIENT
Start: 2022-01-18 | End: 2022-01-18

## 2022-01-18 RX ORDER — CYCLOBENZAPRINE HCL 10 MG
10 TABLET ORAL 3 TIMES DAILY PRN
Qty: 15 TABLET | Refills: 0 | Status: SHIPPED | OUTPATIENT
Start: 2022-01-18 | End: 2022-01-23

## 2022-01-18 RX ORDER — ONDANSETRON 2 MG/ML
INJECTION INTRAMUSCULAR; INTRAVENOUS
Status: DISPENSED
Start: 2022-01-18 | End: 2022-01-19

## 2022-01-18 RX ADMIN — ONDANSETRON 4 MG: 2 INJECTION INTRAMUSCULAR; INTRAVENOUS at 02:01

## 2022-01-18 RX ADMIN — SODIUM CHLORIDE 1000 ML: 0.9 INJECTION, SOLUTION INTRAVENOUS at 02:01

## 2022-01-18 NOTE — ED PROVIDER NOTES
Encounter Date: 1/18/2022       History     Chief Complaint   Patient presents with    Nausea    Vomiting    Weakness    Fall     Left chest and rib pain / eccymosis      Patient is a 36-year-old female who presents the emergency room because she has persistent nausea vomiting and diarrhea.  She had diarrhea this morning and vomited once yesterday.  The patient was diagnosed with COVID approximately 19 days ago.  Her cough congestion shortness of breath have improved.  The other day she felt weak and fell on her left side and has reproducible left lateral chest wall discomfort.  She has a bruise over her left breast.  She denies any loss consciousness headache neck pain weakness numbness.  She has no recurrent episodes of syncope.        Review of patient's allergies indicates:   Allergen Reactions    Adhesive     Bactrim [sulfamethoxazole-trimethoprim]      No past medical history on file.  No past surgical history on file.  No family history on file.     Review of Systems   Constitutional: Positive for appetite change. Negative for fatigue and fever.   HENT: Negative for ear pain, rhinorrhea and sore throat.    Eyes: Negative for pain and visual disturbance.   Respiratory: Negative for cough and shortness of breath.    Cardiovascular: Negative for chest pain.   Gastrointestinal: Positive for diarrhea, nausea and vomiting. Negative for abdominal pain.   Genitourinary: Negative for difficulty urinating.   Musculoskeletal: Negative for arthralgias.   Skin: Negative for rash.   Neurological: Positive for weakness (Generalized that). Negative for numbness and headaches.   All other systems reviewed and are negative.      Physical Exam     Initial Vitals [01/18/22 1239]   BP Pulse Resp Temp SpO2   124/89 85 18 98.1 °F (36.7 °C) 98 %      MAP       --         Physical Exam    Nursing note and vitals reviewed.  Constitutional: She appears well-developed and well-nourished. No distress.   HENT:   Head: Normocephalic  and atraumatic.   Mouth/Throat: Oropharynx is clear and moist.   Eyes: Conjunctivae and EOM are normal. Pupils are equal, round, and reactive to light.   Neck: Neck supple.   Normal range of motion.  Cardiovascular: Normal rate, regular rhythm, normal heart sounds and intact distal pulses.   Pulmonary/Chest: Breath sounds normal. She exhibits tenderness (Reproducible left mid lateral chest wall tenderness.  Small bruise to the left lateral breast).   Abdominal: Abdomen is soft. Bowel sounds are normal.   Musculoskeletal:         General: No edema. Normal range of motion.      Cervical back: Normal range of motion and neck supple.     Neurological: She is alert and oriented to person, place, and time. She has normal strength. No sensory deficit. GCS score is 15. GCS eye subscore is 4. GCS verbal subscore is 5. GCS motor subscore is 6.   Skin: Skin is warm and dry. No rash noted.   Psychiatric: She has a normal mood and affect.         ED Course   Procedures  Labs Reviewed   CBC W/ AUTO DIFFERENTIAL - Abnormal; Notable for the following components:       Result Value    Gran % 73.4 (*)     All other components within normal limits   COMPREHENSIVE METABOLIC PANEL          Imaging Results          X-Ray Ribs 2 View Left (Final result)  Result time 01/18/22 13:32:06    Final result by Riley Simon Jr., MD (01/18/22 13:32:06)                 Impression:      Negative plain x-rays of the left ribs      Electronically signed by: Riley Simon MD  Date:    01/18/2022  Time:    13:32             Narrative:    EXAMINATION:  XR RIBS 2 VIEW LEFT    CLINICAL HISTORY:  Unspecified fall, initial encounter    TECHNIQUE:  Two views of the left ribs were performed.    COMPARISON:  None.    FINDINGS:  A fracture or other osseous abnormalities of the left ribs is not seen.  Underlying pleural or pulmonary abnormalities are not identified.                               X-Ray Chest PA And Lateral (Final result)  Result time  01/18/22 13:30:13    Final result by Riley Simon Jr., MD (01/18/22 13:30:13)                 Impression:      No acute abnormality.      Electronically signed by: Riley Simon MD  Date:    01/18/2022  Time:    13:30             Narrative:    EXAMINATION:  XR CHEST PA AND LATERAL    CLINICAL HISTORY:  Unspecified fall, initial encounter    TECHNIQUE:  PA and lateral views of the chest were performed.    COMPARISON:  Chest x-ray of June 19, 2020    FINDINGS:  The lungs are clear, with normal appearance of pulmonary vasculature and no pleural effusion or pneumothorax.    The cardiac silhouette is normal in size. The hilar and mediastinal contours are unremarkable.    Bones are intact.                                 Medications   ondansetron injection 4 mg (4 mg Intravenous Given 1/18/22 1402)   sodium chloride 0.9% bolus 1,000 mL (0 mLs Intravenous Stopped 1/18/22 1502)     Medical Decision Making:   ED Management:  Patient likely has some dehydration from COVID and probable rib contusion.  She is not hypoxic.  I do not think this is a pulmonary embolism.  Labs are stable.  She feels better after fluids and can take over-the-counter anti-inflammatories muscle relaxer and lidocaine patch.  Return precautions given.  Stable for discharge                      Clinical Impression:   Final diagnoses:  [W19.XXXA] Fall  [E86.0] Dehydration (Primary)  [U07.1] COVID          ED Disposition Condition    Discharge Stable        ED Prescriptions     Medication Sig Dispense Start Date End Date Auth. Provider    ondansetron (ZOFRAN-ODT) 4 MG TbDL Take 1 tablet (4 mg total) by mouth every 8 (eight) hours as needed. 15 tablet 1/18/2022  Jeffy Orellana MD    albuterol (PROVENTIL/VENTOLIN HFA) 90 mcg/actuation inhaler Inhale 1-2 puffs into the lungs every 6 (six) hours as needed for Wheezing. Rescue 18 g 1/18/2022 1/18/2023 Jeffy Orellana MD    LIDOcaine (LIDODERM) 5 % Place 1 patch onto the skin once daily. Remove &  Discard patch within 12 hours or as directed by MD 30 patch 1/18/2022  Jeffy Orellana MD    cyclobenzaprine (FLEXERIL) 10 MG tablet Take 1 tablet (10 mg total) by mouth 3 (three) times daily as needed. 15 tablet 1/18/2022 1/23/2022 Jeffy Orellana MD        Follow-up Information     Follow up With Specialties Details Why Contact Info    [facility] Emergency Dept Emergency Medicine  If symptoms worsen [address] [place] [id number] [phone]           Jeffy Orellana MD  01/18/22 4537

## 2022-01-18 NOTE — FIRST PROVIDER EVALUATION
Emergency Department TeleTriage Encounter Note      CHIEF COMPLAINT    Chief Complaint   Patient presents with    Nausea    Vomiting    Weakness    Fall     Left chest and rib pain / eccymosis        VITAL SIGNS   Initial Vitals [01/18/22 1239]   BP Pulse Resp Temp SpO2   124/89 85 18 98.1 °F (36.7 °C) 98 %      MAP       --            ALLERGIES    Review of patient's allergies indicates:   Allergen Reactions    Adhesive     Bactrim [sulfamethoxazole-trimethoprim]        PROVIDER TRIAGE NOTE    28-year-old female presents ER for evaluation of persistent nausea vomiting.  She has not been able to keep down any food or liquids.  No abdominal pain.  She also reports feeling weak and had a fall hitting the left side of her chest.  No LOC.  reports COVID positive on 01/01    Initial orders will be placed and care will be transferred to an alternate provider when patient is roomed for a full evaluation. Any additional orders and the final disposition will be determined by that provider.      ORDERS  Labs Reviewed   CBC W/ AUTO DIFFERENTIAL   COMPREHENSIVE METABOLIC PANEL       ED Orders (720h ago, onward)    Start Ordered     Status Ordering Provider    01/18/22 1300 01/18/22 1251  ondansetron injection 4 mg  ED 1 Time         Ordered ISSAC LUIS    01/18/22 1300 01/18/22 1251  sodium chloride 0.9% bolus 1,000 mL  ED 1 Time         Ordered ISSAC LUIS    01/18/22 1251 01/18/22 1251  X-Ray Ribs 2 View Left  1 time imaging         Ordered ISSAC LUIS    01/18/22 1251 01/18/22 1251  X-Ray Chest PA And Lateral  1 time imaging         Ordered ISSAC LUIS    01/18/22 1251 01/18/22 1251  Complete Blood Count (CBC)  STAT         Ordered ISSAC LUIS    01/18/22 1251 01/18/22 1251  Comprehensive Metabolic Panel (CMP)  STAT         Ordered ISSAC LUIS    01/18/22 1251 01/18/22 1251  Insert Saline lock IV  Once         Ordered ISSAC LUIS            Virtual Visit Note: The provider  triage portion of this emergency department evaluation and documentation was performed via [employer], a HIPAA-compliant telemedicine application, in concert with a tele-presenter in the room. A face to face patient evaluation with one of my colleagues will occur once the patient is placed in an emergency department room.      DISCLAIMER: This note was prepared with [email or web address] voice recognition transcription software. Garbled syntax, mangled pronouns, and other bizarre constructions may be attributed to that software system.

## 2022-01-18 NOTE — ED NOTES
Lindsay Sneed is awake, alert and oriented x 3, skin warm and dry, in NAD.  Patient is covid + on 1/1/22.  States that she has been weak and has had NVD since.  States that she fell, injuring her left ribs.    Patient identifiers for Lindsay Sneed checked and correct.  LOC:  Lindsay Sneed is awake, alert, and aware of environment with an appropriate affect. She is oriented x 3 and speaking appropriately.  APPEARANCE:  She is resting comfortably and in no acute distress. She is clean and well groomed, patient's clothing is properly fastened.  SKIN:  The skin is warm and dry. She has normal skin turgor and moist mucus membranes. Bruising to left rib and breast area.  MUSCULOSKELETAL:  She is moving all extremities well, no obvious deformities noted. Pulses intact.   RESPIRATORY:  Airway is open and patent. Respirations are spontaneous and non-labored with normal effort and rate.  cough  CARDIAC:  She has a normal rate and rhythm. No peripheral edema noted. Capillary refill < 3 seconds.  ABDOMEN:  No distention noted.  Soft and non-tender upon palpation.  NV&D  NEUROLOGICAL:  PERRL. Facial expression is symmetrical. Hand grasps are equal bilaterally. Normal sensation in all extremities when touched with finger.  Allergies reported:    Review of patient's allergies indicates:   Allergen Reactions    Adhesive     Bactrim [sulfamethoxazole-trimethoprim]      OTHER NOTES:  Lindsay Sneed is here with

## 2022-01-19 ENCOUNTER — PATIENT OUTREACH (OUTPATIENT)
Dept: EMERGENCY MEDICINE | Facility: HOSPITAL | Age: 37
End: 2022-01-19
Payer: COMMERCIAL

## 2022-01-24 ENCOUNTER — HOSPITAL ENCOUNTER (EMERGENCY)
Facility: HOSPITAL | Age: 37
Discharge: HOME | End: 2022-01-24
Attending: EMERGENCY MEDICINE
Payer: COMMERCIAL

## 2022-01-24 VITALS
RESPIRATION RATE: 14 BRPM | OXYGEN SATURATION: 100 % | WEIGHT: 215 LBS | HEIGHT: 66 IN | TEMPERATURE: 98 F | HEART RATE: 97 BPM | SYSTOLIC BLOOD PRESSURE: 110 MMHG | BODY MASS INDEX: 34.55 KG/M2 | DIASTOLIC BLOOD PRESSURE: 76 MMHG

## 2022-01-24 DIAGNOSIS — Z86.16 PERSONAL HISTORY OF COVID-19: Primary | ICD-10-CM

## 2022-01-24 DIAGNOSIS — R05.9 COUGH: ICD-10-CM

## 2022-01-24 DIAGNOSIS — J12.9 VIRAL PNEUMONIA: ICD-10-CM

## 2022-01-24 LAB — SARS-COV-2 RDRP RESP QL NAA+PROBE: NEGATIVE

## 2022-01-24 PROCEDURE — U0002 COVID-19 LAB TEST NON-CDC: HCPCS | Performed by: NURSE PRACTITIONER

## 2022-01-24 PROCEDURE — 99283 EMERGENCY DEPT VISIT LOW MDM: CPT | Mod: 25

## 2022-01-24 RX ORDER — METHOCARBAMOL 750 MG/1
750 TABLET, FILM COATED ORAL 3 TIMES DAILY PRN
Qty: 15 TABLET | Refills: 0 | Status: SHIPPED | OUTPATIENT
Start: 2022-01-24 | End: 2022-01-29

## 2022-01-24 NOTE — Clinical Note
"Lindsay "Donna Sneed was seen and treated in our emergency department on 1/24/2022.     COVID-19 is present in our communities across the state. There is limited testing for COVID at this time, so not all patients can be tested. In this situation, your employee meets the following criteria:    Lindsay Sneed has met the criteria for COVID-19 testing and has a NEGATIVE result. The employee can return to work once they are asymptomatic for 24 hours without the use of fever reducing medications (Tylenol, Motrin, etc).     If the employee is not fully vaccinated and had a close contact:  · Retest at 5 to 7 days post-exposure  · If possible, it is recommended that they quarantine for 5 days from the time of contact regardless of their test status.  · A mask should be worn post quarantine for 5 days.    If you have any questions or concerns, or if I can be of further assistance, please do not hesitate to contact me.    Sincerely,             Minoo Waters NP"

## 2022-01-24 NOTE — FIRST PROVIDER EVALUATION
Emergency Department TeleTriage Encounter Note      CHIEF COMPLAINT    Chief Complaint   Patient presents with    COVID-19 Concerns     Nausea and congestion for the past 3 weeks. Tested positive for COVID on 01/01/2022       VITAL SIGNS   Initial Vitals [01/24/22 1429]   BP Pulse Resp Temp SpO2   115/80 (!) 111 16 98.1 °F (36.7 °C) 97 %      MAP       --            ALLERGIES    Review of patient's allergies indicates:   Allergen Reactions    Adhesive     Bactrim [sulfamethoxazole-trimethoprim]        PROVIDER TRIAGE NOTE  This is a teletriage evaluation of a 37 y.o. female presenting to the ED complaining of nausea and congestion for three weeks.  Reports +COVID test result 1/1/22. States that she also has had left sided rib pain for the past several days.     Initial orders will be placed and care will be transferred to an alternate provider when patient is roomed for a full evaluation. Any additional orders and the final disposition will be determined by that provider.           ORDERS  Labs Reviewed   POCT URINE PREGNANCY       ED Orders (720h ago, onward)    Start Ordered     Status Ordering Provider    01/24/22 1456 01/24/22 1455  POCT urine pregnancy  Once         Ordered STEPH DUNN            Virtual Visit Note: The provider triage portion of this emergency department evaluation and documentation was performed via [employer], a HIPAA-compliant telemedicine application, in concert with a tele-presenter in the room. A face to face patient evaluation with one of my colleagues will occur once the patient is placed in an emergency department room.      DISCLAIMER: This note was prepared with [employer] voice recognition transcription software. Garbled syntax, mangled pronouns, and other bizarre constructions may be attributed to that software system.

## 2022-01-24 NOTE — ED PROVIDER NOTES
Encounter Date: 1/24/2022       History     Chief Complaint   Patient presents with    COVID-19 Concerns     Nausea and congestion for the past 3 weeks. Tested positive for COVID on 01/01/2022     Patient is a 37-year-old female with no significant medical history presenting to the ED for congestion and left-sided chest pain since being diagnosed with COVID on January 1st.  Patient has had multiple ED and urgent care visits since her diagnosis.  Patient states she needs a negative test return to work.  Patient states she has had continued symptoms since her diagnosis.  Patient states she took her home dose of steroids when she was diagnosed with COVID.  Patient states she was seen last week after a fall due to dizziness and was evaluated [facility].    The history is provided by the patient and medical records.     Review of patient's allergies indicates:   Allergen Reactions    Adhesive     Bactrim [sulfamethoxazole-trimethoprim]      No past medical history on file.  No past surgical history on file.  No family history on file.     Review of Systems   Constitutional: Negative for fever.   HENT: Positive for congestion. Negative for sore throat.    Respiratory: Negative for shortness of breath.    Cardiovascular: Positive for chest pain ( left chest wall).   Gastrointestinal: Positive for nausea.   Genitourinary: Negative for dysuria.   Musculoskeletal: Negative for back pain.   Skin: Negative for rash.   Neurological: Positive for weakness ( generalized).   Hematological: Does not bruise/bleed easily.   All other systems reviewed and are negative.      Physical Exam     Initial Vitals [01/24/22 1429]   BP Pulse Resp Temp SpO2   115/80 (!) 111 16 98.1 °F (36.7 °C) 97 %      MAP       --         Physical Exam    Nursing note and vitals reviewed.  Constitutional: Vital signs are normal. She appears well-developed and well-nourished. She is Obese . She is cooperative. No distress.   HENT:   Head:  Normocephalic and atraumatic.   Mouth/Throat: Uvula is midline, oropharynx is clear and moist and mucous membranes are normal.   Eyes: Conjunctivae, EOM and lids are normal. Pupils are equal, round, and reactive to light.   Neck: Trachea normal and phonation normal. Neck supple.   Normal range of motion.  Cardiovascular: Normal rate, regular rhythm and intact distal pulses.   Pulses:       Radial pulses are 2+ on the right side and 2+ on the left side.   Pulmonary/Chest: Effort normal. She exhibits tenderness. Left breast exhibits tenderness.   Reproducible left mid lateral chest wall tenderness.  Ecchymosis noted to lateral left breast.     Musculoskeletal:      Cervical back: Normal range of motion and neck supple.      Comments: Steady gait appreciated     Neurological: She is alert and oriented to person, place, and time. She has normal strength. No sensory deficit. GCS eye subscore is 4. GCS verbal subscore is 5. GCS motor subscore is 6.   Skin: Skin is warm, dry and intact. Capillary refill takes less than 2 seconds. No pallor.         ED Course   Procedures  Labs Reviewed   SARS-COV-2 RNA AMPLIFICATION, QUAL   POCT URINE PREGNANCY          Imaging Results          X-Ray Chest PA And Lateral (Final result)  Result time 01/24/22 15:22:51    Final result by Gwen Romo MD (01/24/22 15:22:51)                 Narrative:    Reason: COVID-19 Concerns (Nausea and congestion for the past 3 weeks. Tested positive for COVID on 01/01/2022)    FINDINGS:  PA and lateral chest is compared to 1/18/2022 show normal cardiomediastinal silhouette.    There is development of faint linear interstitial opacity within the left lung base. The remainder of the lungs are clear. There are no pleural effusions. Pulmonary vasculature is normal. There are degenerative changes of the spine.    IMPRESSION:  Development of faint linear interstitial opacity in the left lung base. Differential includes atelectasis versus  "pneumonia.    Electronically signed by:  Gwen Romo MD  1/24/2022 3:22 PM CST Workstation: [id number]                            X-Rays:   Independently Interpreted Readings:   Other Readings:  Reviewed by me, read by Radiology    Medications - No data to display  Medical Decision Making:   Initial Assessment:   Emergent evaluation of a 38 yo female presenting for intermittent dizziness, weakness, congestion, nausea and left chest wall pain since her COVID diagnosis on January 1st.  Patient has had multiple ED and urgent care visits for similar complaints.  Patient states she needs a negative test return to work.  Patient states she was seen last week after a fall due to weakness and prescribed Flexeril, over-the-counter anti-inflammatories and a Lidoderm patch.  Lidoderm not covered by insurance.  Patient states she has continued pain and shortness of breath.  Patient states she has not been using her albuterol due to it not working.  Patient states she is not taking her Zofran or Phenergan due to not being able tolerate.  Patient states I just want to go back to work and they will let me because it is their fault I have COVID."    On exam pt is A&Ox3. VSS. Nonfebrile and nontoxic appearing. Breath sounds clear bilaterally. Mucous membranes pink and moist. Tonsils with no redness, erythema or exudates. Abdomen soft and nontender. No rebound or guarding appreciated on exam.   BS WNL.  Pt speaking in full sentences.  Steady gait appreciated. Cap refill < 3 seconds.      Differential Diagnosis:   Differential diagnoses include but are not limited to viral URI including influenza type illness, coronavirus, bronchitis, pneumonia,  or reactive airway disease.      Independently Interpreted Test(s):   I have ordered and independently interpreted X-rays - see prior notes.  Clinical Tests:   Lab Tests: Ordered and Reviewed  Radiological Study: Ordered and Reviewed  ED Management:  Will get chest x-ray and " reassess.  I discussed this case with my supervising physician.    Discussed x-ray results with patient.  Advised this is likely viral pneumonia versus atelectasis due to recent COVID infection.  Patient advised to continue to use albuterol inhaler as prescribed.  Tylenol or ibuprofen for pain.  Will prescribe Robaxin to take during the day for left breast tenderness after fall.  Advised she can take the Flexeril at night.  Advised deep breathing exercises and patient given incentive spirometer to promote deep breathing.  Advised patient we can retest her due to work requiring a repeat negative test.  Will release results via my [facility] portal.  Patient states she has continued nausea and is unable to tolerate Zofran or Phenergan as prescribed.  Offered Phenergan suppositories and patient refused.  Patient angry that we cannot treat her COVID and improve her symptoms.  Patient advised she needs to follow up with her PCP and not ED or urgent care if symptoms continue.  Patient states well you can tell me where to be seen and my insurance covers here.  Patient advised she is correct but recommend further evaluation treatment by PCP due to no emergent symptoms and ongoing COVID symptoms.  Patient verbalized understanding of this plan of care.  All questions and concerns addressed.    Patient is hemodynamically stable, vital signs are normal. Discharge instructions given. Return to ED precautions discussed. Follow up as directed. Pt verbalized understanding of this plan.  Pt is stable for discharge.                       Clinical Impression:   Final diagnoses:  [R05.9] Cough  [Z86.16] Personal history of COVID-19 (Primary)  [J12.9] Viral pneumonia          ED Disposition Condition    Discharge Stable        ED Prescriptions     Medication Sig Dispense Start Date End Date Auth. Provider    methocarbamoL (ROBAXIN) 750 MG Tab Take 1 tablet (750 mg total) by mouth 3 (three) times daily as needed (muscle strain). 15 tablet  1/24/2022 1/29/2022 Minoo Waters NP        Follow-up Information    None          Minoo Waters NP  01/24/22 5943

## 2022-02-03 ENCOUNTER — HOSPITAL ENCOUNTER (EMERGENCY)
Facility: HOSPITAL | Age: 37
Discharge: HOME | End: 2022-02-03
Attending: EMERGENCY MEDICINE
Payer: COMMERCIAL

## 2022-02-03 VITALS
DIASTOLIC BLOOD PRESSURE: 55 MMHG | HEART RATE: 103 BPM | OXYGEN SATURATION: 99 % | BODY MASS INDEX: 34.7 KG/M2 | RESPIRATION RATE: 18 BRPM | TEMPERATURE: 98 F | SYSTOLIC BLOOD PRESSURE: 113 MMHG | WEIGHT: 215 LBS

## 2022-02-03 DIAGNOSIS — S61.309A FINGERNAIL AVULSION, INITIAL ENCOUNTER: Primary | ICD-10-CM

## 2022-02-03 DIAGNOSIS — S60.112A CONTUSION OF LEFT THUMB WITH DAMAGE TO NAIL, INITIAL ENCOUNTER: ICD-10-CM

## 2022-02-03 PROCEDURE — 25000003 PHARM REV CODE 250: Performed by: PHYSICIAN ASSISTANT

## 2022-02-03 PROCEDURE — 11760 REPAIR OF NAIL BED: CPT

## 2022-02-03 PROCEDURE — 99283 EMERGENCY DEPT VISIT LOW MDM: CPT | Mod: 25

## 2022-02-03 RX ORDER — LIDOCAINE HYDROCHLORIDE 10 MG/ML
10 INJECTION INFILTRATION; PERINEURAL
Status: COMPLETED | OUTPATIENT
Start: 2022-02-03 | End: 2022-02-03

## 2022-02-03 RX ADMIN — LIDOCAINE HYDROCHLORIDE 10 ML: 10 INJECTION, SOLUTION INFILTRATION; PERINEURAL at 01:02

## 2022-02-03 NOTE — ED PROVIDER NOTES
Encounter Date: 2/3/2022       History     Chief Complaint   Patient presents with    Hand Injury     Left thumb slammed in car door 0900 today     Lindsay Sneed is a 37 y.o. female presenting for evaluation of left thumb and fingernail pain after accidentally slamming her thumb in the car door at 9 AM.  No numbness, tingling or weakness.  She states her nail was bent backwards and lifted off the finger.     The history is provided by the patient.     Review of patient's allergies indicates:   Allergen Reactions    Adhesive     Bactrim [sulfamethoxazole-trimethoprim]      History reviewed. No pertinent past medical history.  History reviewed. No pertinent surgical history.  History reviewed. No pertinent family history.     Review of Systems   Constitutional: Negative for chills and fever.   Musculoskeletal: Positive for arthralgias, joint swelling and myalgias. Negative for back pain, neck pain and neck stiffness.   Skin: Negative for color change, pallor, rash and wound.   Neurological: Negative for weakness and numbness.   Hematological: Does not bruise/bleed easily.       Physical Exam     Initial Vitals [02/03/22 1202]   BP Pulse Resp Temp SpO2   (!) 113/55 103 18 97.5 °F (36.4 °C) 99 %      MAP       --         Physical Exam    Nursing note and vitals reviewed.  Constitutional: She appears well-developed and well-nourished. She is not diaphoretic. No distress.   HENT:   Head: Normocephalic and atraumatic.   Cardiovascular: Intact distal pulses.   Musculoskeletal:         General: Tenderness present. Normal range of motion.      Comments: TTP noted to nail of left thumb.  Partial avulsion of nail noted without evidence for nailbed laceration.  No decreased ROM, decreased strength or loss of sensation to left thumb.       Neurological: She is alert and oriented to person, place, and time. She has normal strength. No sensory deficit.   Skin: Skin is warm and dry. No rash and no abscess noted. No erythema.    Psychiatric: She has a normal mood and affect.         ED Course   Lac Repair    Date/Time: 2/3/2022 5:52 PM  Performed by: Elizabeth Fuller PA-C  Authorized by: Keshav Trejo MD     Consent:     Consent obtained:  Verbal    Consent given by:  Patient  Anesthesia:     Anesthesia method:  Nerve block    Block needle gauge:  25 G    Block anesthetic:  Lidocaine 1% w/o epi    Block injection procedure:  Anatomic landmarks identified  Laceration details:     Location:  Finger    Finger location:  L thumb    Wound length (cm): partial nail avulsion without laceration.  Pre-procedure details:     Preparation:  Patient was prepped and draped in usual sterile fashion  Treatment:     Area cleansed with:  Povidone-iodine    Amount of cleaning:  Extensive    Irrigation solution:  Sterile saline    Irrigation method:  Syringe  Skin repair:     Repair method:  Tissue adhesive (Dermabond used to keep nail intact on nailbed)  Post-procedure details:     Dressing:  Non-adherent dressing    Procedure completion:  Tolerated well, no immediate complications      Labs Reviewed - No data to display       Imaging Results          X-Ray Finger 2 or More Views Left (Final result)  Result time 02/03/22 15:01:43    Final result by Joe Diaz MD (02/03/22 15:01:43)                 Narrative:    EXAMINATION:  XR FINGER 2 OR MORE VIEWS LEFT    CLINICAL HISTORY:  left thumb injury;    TECHNIQUE:  Frontal oblique and lateral views of the left fingers with attention to the thumb.    COMPARISON:  None    FINDINGS:  No displaced fracture or traumatic malalignment.  Preserved joint spaces.  Soft tissue swelling tip of the thumb without radiopaque retained foreign body.      Electronically signed by: Joe Diaz  Date:    02/03/2022  Time:    15:01                               Medications   LIDOcaine HCL 10 mg/ml (1%) injection 10 mL (10 mLs Infiltration Given 2/3/22 1322)     Medical Decision Making:   History:   Old Medical  Records: I decided to obtain old medical records.  Old Records Summarized: records from clinic visits and records from previous admission(s).  Differential Diagnosis:   Laceration   Contusion   Fracture   Nail avulsion   Clinical Tests:   Radiological Study: Ordered and Reviewed       APC / Resident Notes:   X-rays of thumb show no acute abnormalities, fractures or dislocations.  Nail is partially avulsed, but no nailbed laceration noted.  Nail is afixed to nailbed with Dermabond.  She is discharged home to follow-up with her PCP for re-evaluation as needed.  She voices understanding and is agreeable to the plan.  She is given specific return precautions.          Attending Attestation:     Physician Attestation Statement for NP/PA:   I discussed this assessment and plan of this patient with the NP/PA, but I did not personally examine the patient. The face to face encounter was performed by the NP/PA.    Other NP/PA Attestation Additions:    History of Present Illness: 37-year-old female presents with a finger injury.    Medical Decision Making: Initial differential diagnosis included but not limited to fracture, dislocation, and laceration.  I am in agreement with the physician assistant's  assessment, treatment, and plan of care.                      Clinical Impression:   Final diagnoses:  [S61.309A] Fingernail avulsion, initial encounter (Primary)  [S60.112A] Contusion of left thumb with damage to nail, initial encounter          ED Disposition Condition    Discharge Stable        ED Prescriptions     None        Follow-up Information     Follow up With Specialties Details Why Contact [facility] Emergency Dept Emergency Medicine  As needed, If symptoms worsen [address] [place] [id number] [phone]           Elizabeth Fuller PA-C  02/03/22 1755       Keshav Trejo MD  02/03/22 1971

## 2022-02-03 NOTE — ED NOTES
Pt resting on stretcher in NAD, respirations even and unlabored. Stretcher locked and in lowest position, call bell within reach. Pt offered restroom assistance, pt states no needs at this time. Will continue to monitor and update pt on plan of care.

## 2022-02-03 NOTE — ED NOTES
Pt identifiers checked and accurate with Lindsay Sneed     Pt presents to ED with complaints of left thumb pain after slamming hand in car door this AM. Pt reports limited mobility of thumb at this time.

## 2022-02-03 NOTE — Clinical Note
"Lindsay Chule" Naren was seen and treated in our emergency department on 2/3/2022.  She may return to work on 02/04/2022.  Please allow patient to limit use of left thumb until symptoms improved.      If you have any questions or concerns, please don't hesitate to call.      Elizabeth Fuller PA-C"

## 2022-02-04 ENCOUNTER — PATIENT OUTREACH (OUTPATIENT)
Dept: EMERGENCY MEDICINE | Facility: HOSPITAL | Age: 37
End: 2022-02-04
Payer: COMMERCIAL

## 2022-02-06 ENCOUNTER — HOSPITAL ENCOUNTER (EMERGENCY)
Facility: HOSPITAL | Age: 37
Discharge: HOME | End: 2022-02-06
Attending: EMERGENCY MEDICINE
Payer: COMMERCIAL

## 2022-02-06 VITALS
TEMPERATURE: 98 F | HEIGHT: 65 IN | OXYGEN SATURATION: 100 % | RESPIRATION RATE: 16 BRPM | BODY MASS INDEX: 35.82 KG/M2 | WEIGHT: 215 LBS | DIASTOLIC BLOOD PRESSURE: 60 MMHG | SYSTOLIC BLOOD PRESSURE: 119 MMHG | HEART RATE: 78 BPM

## 2022-02-06 DIAGNOSIS — Z77.29 NATURAL GAS EXPOSURE: Primary | ICD-10-CM

## 2022-02-06 PROCEDURE — 99283 EMERGENCY DEPT VISIT LOW MDM: CPT
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
## 2022-02-07 NOTE — DISCHARGE INSTRUCTIONS
Return to emergency department if fever, vomiting, dizziness, persistent headaches or additional concerns.

## 2023-03-21 ENCOUNTER — HOSPITAL ENCOUNTER (EMERGENCY)
Facility: HOSPITAL | Age: 38
Discharge: HOME | End: 2023-03-21
Attending: EMERGENCY MEDICINE
Payer: MEDICAID

## 2023-03-21 VITALS
HEART RATE: 82 BPM | OXYGEN SATURATION: 98 % | DIASTOLIC BLOOD PRESSURE: 83 MMHG | HEIGHT: 65 IN | TEMPERATURE: 98 F | RESPIRATION RATE: 18 BRPM | BODY MASS INDEX: 35.82 KG/M2 | WEIGHT: 215 LBS | SYSTOLIC BLOOD PRESSURE: 118 MMHG

## 2023-03-21 DIAGNOSIS — A08.4 VIRAL GASTROENTERITIS: Primary | ICD-10-CM

## 2023-03-21 DIAGNOSIS — R07.9 CHEST PAIN: ICD-10-CM

## 2023-03-21 DIAGNOSIS — K21.9 GASTROESOPHAGEAL REFLUX DISEASE, UNSPECIFIED WHETHER ESOPHAGITIS PRESENT: ICD-10-CM

## 2023-03-21 LAB
ALBUMIN SERPL BCP-MCNC: 4.2 G/DL (ref 3.5–5.2)
ALP SERPL-CCNC: 86 U/L (ref 55–135)
ALT SERPL W/O P-5'-P-CCNC: 15 U/L (ref 10–44)
ANION GAP SERPL CALC-SCNC: 13 MMOL/L (ref 8–16)
AST SERPL-CCNC: 18 U/L (ref 10–40)
BASOPHILS # BLD AUTO: 0.06 K/UL (ref 0–0.2)
BASOPHILS NFR BLD: 0.6 % (ref 0–1.9)
BILIRUB SERPL-MCNC: 0.9 MG/DL (ref 0.1–1)
BNP SERPL-MCNC: 8 PG/ML (ref 0–99)
BUN SERPL-MCNC: 10 MG/DL (ref 6–20)
CALCIUM SERPL-MCNC: 9.5 MG/DL (ref 8.7–10.5)
CHLORIDE SERPL-SCNC: 107 MMOL/L (ref 95–110)
CO2 SERPL-SCNC: 22 MMOL/L (ref 23–29)
CREAT SERPL-MCNC: 0.7 MG/DL (ref 0.5–1.4)
DIFFERENTIAL METHOD: ABNORMAL
EOSINOPHIL # BLD AUTO: 0 K/UL (ref 0–0.5)
EOSINOPHIL NFR BLD: 0.3 % (ref 0–8)
ERYTHROCYTE [DISTWIDTH] IN BLOOD BY AUTOMATED COUNT: 12.5 % (ref 11.5–14.5)
EST. GFR  (NO RACE VARIABLE): >60 ML/MIN/1.73 M^2
GLUCOSE SERPL-MCNC: 84 MG/DL (ref 70–110)
HCT VFR BLD AUTO: 43.2 % (ref 37–48.5)
HGB BLD-MCNC: 14.5 G/DL (ref 12–16)
IMM GRANULOCYTES # BLD AUTO: 0.02 K/UL (ref 0–0.04)
IMM GRANULOCYTES NFR BLD AUTO: 0.2 % (ref 0–0.5)
LIPASE SERPL-CCNC: 31 U/L (ref 4–60)
LYMPHOCYTES # BLD AUTO: 2 K/UL (ref 1–4.8)
LYMPHOCYTES NFR BLD: 21.9 % (ref 18–48)
MAGNESIUM SERPL-MCNC: 1.9 MG/DL (ref 1.6–2.6)
MCH RBC QN AUTO: 32.1 PG (ref 27–31)
MCHC RBC AUTO-ENTMCNC: 33.6 G/DL (ref 32–36)
MCV RBC AUTO: 96 FL (ref 82–98)
MONOCYTES # BLD AUTO: 0.6 K/UL (ref 0.3–1)
MONOCYTES NFR BLD: 6.3 % (ref 4–15)
NEUTROPHILS # BLD AUTO: 6.6 K/UL (ref 1.8–7.7)
NEUTROPHILS NFR BLD: 70.7 % (ref 38–73)
NRBC BLD-RTO: 0 /100 WBC
PLATELET # BLD AUTO: 312 K/UL (ref 150–450)
PMV BLD AUTO: 10.4 FL (ref 9.2–12.9)
POTASSIUM SERPL-SCNC: 4 MMOL/L (ref 3.5–5.1)
PROT SERPL-MCNC: 8.1 G/DL (ref 6–8.4)
RBC # BLD AUTO: 4.52 M/UL (ref 4–5.4)
SODIUM SERPL-SCNC: 142 MMOL/L (ref 136–145)
TROPONIN I SERPL HS-MCNC: <2.3 PG/ML (ref 0–14.9)
WBC # BLD AUTO: 9.27 K/UL (ref 3.9–12.7)

## 2023-03-21 PROCEDURE — 85025 COMPLETE CBC W/AUTO DIFF WBC: CPT | Performed by: NURSE PRACTITIONER

## 2023-03-21 PROCEDURE — 93010 ELECTROCARDIOGRAM REPORT: CPT | Mod: ,,, | Performed by: GENERAL PRACTICE

## 2023-03-21 PROCEDURE — 84484 ASSAY OF TROPONIN QUANT: CPT | Performed by: NURSE PRACTITIONER

## 2023-03-21 PROCEDURE — 25000003 PHARM REV CODE 250: Performed by: NURSE PRACTITIONER

## 2023-03-21 PROCEDURE — 83690 ASSAY OF LIPASE: CPT | Performed by: NURSE PRACTITIONER

## 2023-03-21 PROCEDURE — 83735 ASSAY OF MAGNESIUM: CPT | Performed by: NURSE PRACTITIONER

## 2023-03-21 PROCEDURE — 93010 EKG 12-LEAD: ICD-10-PCS | Mod: ,,, | Performed by: GENERAL PRACTICE

## 2023-03-21 PROCEDURE — 80053 COMPREHEN METABOLIC PANEL: CPT | Performed by: NURSE PRACTITIONER

## 2023-03-21 PROCEDURE — 99284 EMERGENCY DEPT VISIT MOD MDM: CPT | Mod: 25

## 2023-03-21 PROCEDURE — 93005 ELECTROCARDIOGRAM TRACING: CPT | Performed by: GENERAL PRACTICE

## 2023-03-21 PROCEDURE — 83880 ASSAY OF NATRIURETIC PEPTIDE: CPT | Performed by: NURSE PRACTITIONER

## 2023-03-21 RX ORDER — LIDOCAINE HYDROCHLORIDE 20 MG/ML
15 SOLUTION OROPHARYNGEAL ONCE
Status: DISCONTINUED | OUTPATIENT
Start: 2023-03-21 | End: 2023-03-21 | Stop reason: HOSPADM

## 2023-03-21 RX ORDER — SUCRALFATE 1 G/10ML
1 SUSPENSION ORAL 4 TIMES DAILY PRN
Qty: 200 ML | Refills: 0 | Status: SHIPPED | OUTPATIENT
Start: 2023-03-21 | End: 2023-03-26

## 2023-03-21 RX ORDER — MAG HYDROX/ALUMINUM HYD/SIMETH 200-200-20
30 SUSPENSION, ORAL (FINAL DOSE FORM) ORAL ONCE
Status: DISCONTINUED | OUTPATIENT
Start: 2023-03-21 | End: 2023-03-21 | Stop reason: HOSPADM

## 2023-03-21 RX ORDER — ASPIRIN 325 MG
325 TABLET ORAL
Status: DISCONTINUED | OUTPATIENT
Start: 2023-03-21 | End: 2023-03-21

## 2023-03-21 RX ORDER — ONDANSETRON 4 MG/1
4 TABLET, ORALLY DISINTEGRATING ORAL
Status: COMPLETED | OUTPATIENT
Start: 2023-03-21 | End: 2023-03-21

## 2023-03-21 RX ORDER — ONDANSETRON 4 MG/1
4 TABLET, ORALLY DISINTEGRATING ORAL EVERY 8 HOURS PRN
Qty: 15 TABLET | Refills: 0 | Status: SHIPPED | OUTPATIENT
Start: 2023-03-21 | End: 2023-03-26

## 2023-03-21 RX ADMIN — ONDANSETRON 4 MG: 4 TABLET, ORALLY DISINTEGRATING ORAL at 05:03

## 2023-03-21 NOTE — DISCHARGE INSTRUCTIONS
You were seen and evaluated in the ER today.  Your workup while you were here is reassuring for no acute causes of your symptoms.  Please follow-up with your PCP as needed.  Please return to the ED for any worsening symptoms such as chest pain, shortness of breath, fever not controlled with Tylenol or ibuprofen or uncontrolled pain.      Our goal in the emergency department is to always give you outstanding care and exceptional service. You may receive a survey by mail or e-mail in the next week regarding your experience in our ED. We would greatly appreciate your completing and returning the survey. Your feedback provides us with a way to recognize our staff who give very good care and it helps us learn how to improve when your experience was below our aspiration of excellence.

## 2023-03-21 NOTE — ED PROVIDER NOTES
"Source of History:  Patient, chart    Chief complaint:  Chest Pain and Vomiting (X 3 days)      HPI:  Lindsay Sneed is a 38 y.o. female with no significant medical history presenting with vomiting for the past 4 days and upper epigastric pain for the past 2 days.  Pt denies taking anything for symptoms.  Patient denies taking for her symptoms.    This is the extent to the patients complaints today here in the emergency department.    ROS: As per HPI and below:  Constitutional: No fever.  No chills.  Eyes: No visual changes.  ENT: No sore throat. No ear pain    Cardiovascular: No chest pain.  Respiratory: No shortness of breath.  GI:  Positive for abdominal pain.  Positive for vomiting.  Genitourinary: No abnormal urination.  Neurologic: No headache. No focal weakness.  No numbness.  MSK: no back pain.  Integument: No rashes or lesions.  Hematologic: No easy bruising.  Endocrine: No excessive thirst or urination.    Review of patient's allergies indicates:   Allergen Reactions    Adhesive     Bactrim [sulfamethoxazole-trimethoprim]        PMH:  As per HPI and below:  No past medical history on file.  No past surgical history on file.         Physical Exam:    /83   Pulse 82   Temp 98 °F (36.7 °C)   Resp 18   Ht 5' 5" (1.651 m)   Wt 97.5 kg (215 lb)   SpO2 98%   BMI 35.78 kg/m²   Nursing note and vital signs reviewed.  Constitutional: No acute distress.  Nontoxic  Eyes: No conjunctival injection.  Extraocular muscles are intact.  ENT: Oropharynx clear.  Normal phonation.  Cardiovascular: Regular rate and rhythm.  No murmurs. No gallops. No rubs  Respiratory: Clear to auscultation bilaterally.  Good air movement.  No wheezes.  No rhonchi. No rales. No accessory muscle use.  Abdomen:  No tenderness to palpation to abdomen.  Abdomen is soft and nontender.  Bowel sounds within normal limits.  Musculoskeletal: Good range of motion all joints.  No deformities.  Neck supple.  No meningismus.  Skin: No rashes " seen.  Good turgor.  No abrasions.  No ecchymoses.  Neuro: alert and oriented x3,  no focal neurological deficits.  Psych: Appropriate, conversant    MDM    Emergent evaluation of a 37 yo female presenting for upper epigastric pain and vomiting.  Patient states the vomiting began 4 days ago and epigastric pain started 2 days ago.  Patient states pain radiates into her chest.  Patient states she believes that her epigastric pain is due to vomiting.  Patient denies any for her symptoms.  On exam pt is A&Ox3. VSS. Nonfebrile and nontoxic appearing.  Mucous membranes pink and moist. Breath sounds clear bilaterally.  Abdomen soft and nontender. No rebound or guarding appreciated on exam.   BS WNL.  Pt speaking in full sentences.  Steady gait appreciated. Cap refill < 3 seconds.      History Acquisition   Additional history was not acquired from other historians.    The patient's list of active medical problems, social history, medications, and allergies as documented per RN staff has been reviewed.     Differential Diagnoses   Based on available information and the initial assessment, the working differential diagnoses considered during this evaluation include but are not limited to gastritis, gastroenteritis, ischemic chest pain, GERD, hernia,  others.    I will get labs, imaging, medicated and reassessed.  I discussed his case with my supervising physician.      LABS     Labs Reviewed   CBC W/ AUTO DIFFERENTIAL - Abnormal; Notable for the following components:       Result Value    MCH 32.1 (*)     All other components within normal limits   COMPREHENSIVE METABOLIC PANEL - Abnormal; Notable for the following components:    CO2 22 (*)     All other components within normal limits   MAGNESIUM   TROPONIN I HIGH SENSITIVITY   B-TYPE NATRIURETIC PEPTIDE   LIPASE         Imaging     Imaging Results              X-Ray Chest PA And Lateral (Final result)  Result time 03/21/23 15:34:36   Procedure changed from X-Ray Chest AP  Portable     Final result by Inna Ellis IV, MD (03/21/23 15:34:36)                   Narrative:    Chest, 2 views    HISTORY: Chest pain.    Comparison 1/24/2022.    The heart and mediastinum appear unremarkable. There is no acute pulmonary vascular engorgement. The lungs are clear with no infiltrate, volume loss or pleural fluid accumulation observed.    IMPRESSION:    No acute cardiopulmonary disease.    Electronically signed by:  Inna Ellis MD  3/21/2023 3:34 PM CDT Workstation: [id number]                                    A radiology report was available for my review at the time of the encounter.    EKG        Additional Consideration   All available testing was considered during the course of this workup.  Comorbidities taken into consideration during the patient's evaluation and treatment include weight, age.    Social determinants of health were taken into consideration during development of our treatment plan.    Medications   ondansetron disintegrating tablet 4 mg (4 mg Oral Given 3/21/23 1728)      ED Course as of 03/21/23 2043   Tue Mar 21, 2023   1741 CBC unremarkable.  No leukocytosis noted.  H and H stable at 14.5 and 43.2.  CMP unremarkable.  Mag within normal limits.  BMP negative.  Lipase negative troponin negative.  Chest x-ray negative for any acute abnormalities.  Patient reassessed.  Patient advises likely due to acid reflux.  Advise will prescribe medication to help with nausea.  Patient advised to continue with bland diet.  Increase fluids.  Follow-up with PCP as needed.  Strict return to ED precautions discussed.  Patient verbalized understanding of this plan of care.  All questions and concerns addressed. [RZ]   1742 Patient is hemodynamically stable, vital signs are normal. Discharge instructions given. Return to ED precautions discussed. Follow up as directed. Pt verbalized understanding of this plan.  Pt is stable for discharge.  [RZ]      ED Course User Index  [RZ] Minoo KOHLER  ROSY Waters             CLINICAL IMPRESSION  1. Viral gastroenteritis    2. Chest pain    3. Gastroesophageal reflux disease, unspecified whether esophagitis present         ED Disposition Condition    Discharge Stable            Instruction:  I see no indication of an emergent process beyond that addressed during our encounter but have duly counseled the patient/family regarding the need for prompt follow-up as well as the indications that should prompt immediate return to the emergency room should new or worrisome developments occur.  The patient/family has been provided with verbal and printed direction regarding our final diagnosis(es) as well as instructions regarding use of OTC and/or Rx medications intended to manage the patient's aforementioned conditions including:  ED Prescriptions       Medication Sig Dispense Start Date End Date Auth. Provider    ondansetron (ZOFRAN-ODT) 4 MG TbDL Take 1 tablet (4 mg total) by mouth every 8 (eight) hours as needed (nausea). 15 tablet 3/21/2023 3/26/2023 Minoo Waters NP    sucralfate (CARAFATE) 100 mg/mL suspension Take 10 mLs (1 g total) by mouth 4 (four) times daily as needed (nausea). 200 mL 3/21/2023 3/26/2023 Minoo Waters NP          Patient has been advised of following recommended follow-up instructions:  Follow-up Information    None       The patient/family communicates understanding of all this information and all remaining questions and concerns were addressed at this time.      The patient's condition did not warrant review of the  and prescription of controlled substances.         Minoo Waters NP  03/21/23 2043

## 2023-08-02 ENCOUNTER — LAB VISIT (OUTPATIENT)
Dept: LAB | Facility: HOSPITAL | Age: 38
End: 2023-08-02
Payer: MEDICAID

## 2023-08-02 DIAGNOSIS — F33.1 MAJOR DEPRESSIVE DISORDER, RECURRENT, MODERATE: Primary | ICD-10-CM

## 2023-08-02 LAB
ALBUMIN SERPL BCP-MCNC: 3.8 G/DL (ref 3.5–5.2)
ALP SERPL-CCNC: 79 U/L (ref 55–135)
ALT SERPL W/O P-5'-P-CCNC: 14 U/L (ref 10–44)
ANION GAP SERPL CALC-SCNC: 4 MMOL/L (ref 8–16)
AST SERPL-CCNC: 16 U/L (ref 10–40)
BILIRUB SERPL-MCNC: 0.5 MG/DL (ref 0.1–1)
BUN SERPL-MCNC: 15 MG/DL (ref 6–20)
CALCIUM SERPL-MCNC: 9 MG/DL (ref 8.7–10.5)
CHLORIDE SERPL-SCNC: 108 MMOL/L (ref 95–110)
CO2 SERPL-SCNC: 28 MMOL/L (ref 23–29)
CREAT SERPL-MCNC: 0.9 MG/DL (ref 0.5–1.4)
EST. GFR  (NO RACE VARIABLE): >60 ML/MIN/1.73 M^2
GLUCOSE SERPL-MCNC: 79 MG/DL (ref 70–110)
POTASSIUM SERPL-SCNC: 3.7 MMOL/L (ref 3.5–5.1)
PROT SERPL-MCNC: 7.4 G/DL (ref 6–8.4)
SODIUM SERPL-SCNC: 140 MMOL/L (ref 136–145)

## 2023-08-02 PROCEDURE — 80053 COMPREHEN METABOLIC PANEL: CPT

## 2023-08-02 PROCEDURE — 80183 DRUG SCRN QUANT OXCARBAZEPIN: CPT

## 2023-08-06 LAB — OXCARBAZEPINE SERPL-MCNC: <1 UG/ML (ref 10–35)

## 2023-12-15 ENCOUNTER — HOSPITAL ENCOUNTER (EMERGENCY)
Facility: HOSPITAL | Age: 38
Discharge: HOME | End: 2023-12-15
Attending: EMERGENCY MEDICINE
Payer: MEDICAID

## 2023-12-15 VITALS
DIASTOLIC BLOOD PRESSURE: 68 MMHG | HEART RATE: 89 BPM | RESPIRATION RATE: 18 BRPM | SYSTOLIC BLOOD PRESSURE: 134 MMHG | TEMPERATURE: 98 F | OXYGEN SATURATION: 98 %

## 2023-12-15 DIAGNOSIS — S00.03XA CONTUSION OF SCALP, INITIAL ENCOUNTER: Primary | ICD-10-CM

## 2023-12-15 PROCEDURE — 99283 EMERGENCY DEPT VISIT LOW MDM: CPT

## 2023-12-15 RX ORDER — ONDANSETRON 4 MG/1
4 TABLET, ORALLY DISINTEGRATING ORAL EVERY 6 HOURS PRN
Qty: 20 TABLET | Refills: 0 | Status: SHIPPED | OUTPATIENT
Start: 2023-12-15 | End: 2024-01-10

## 2023-12-15 RX ORDER — ONDANSETRON 4 MG/1
4 TABLET, ORALLY DISINTEGRATING ORAL EVERY 6 HOURS PRN
Qty: 20 TABLET | Refills: 0 | Status: SHIPPED | OUTPATIENT
Start: 2023-12-15 | End: 2023-12-15

## 2023-12-15 NOTE — DISCHARGE INSTRUCTIONS

## 2023-12-15 NOTE — ED PROVIDER NOTES
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  Patient reports she was moving cart over step when pull on card contused head.  No LOC.  No use of blood thinners.  She reports having tenderness to site but no other complications at that time but has noticed gradual onset of generalized headache with intermittent nausea and photophobia.  No current nausea.  No vomiting, lightheadedness or dizziness, visual changes, numbness, focal weakness, neck pain or other injuries.  No seizure-like activity, retrograde amnesia.  No other acute complaints at this time.    The history is provided by the patient.     Review of patient's allergies indicates:   Allergen Reactions    Adhesive     Bactrim [sulfamethoxazole-trimethoprim]      History reviewed. No pertinent past medical history.  Past Surgical History:   Procedure Laterality Date    BLADDER SURGERY      due to fistula     SECTION      HYSTERECTOMY      TUBAL LIGATION       History reviewed. No pertinent family history.     Review of Systems   Eyes:  Negative for visual disturbance.   Gastrointestinal:  Positive for nausea. Negative for vomiting.   Musculoskeletal:  Negative for neck pain and neck stiffness.   Skin:  Negative for wound.   Neurological:  Positive for headaches.       Physical Exam     Initial Vitals [12/15/23 1453]   BP Pulse Resp Temp SpO2   134/68 89 18 98 °F (36.7 °C) 98 %      MAP       --         Physical Exam    Nursing note and vitals reviewed.  Constitutional: Vital signs are normal. She appears well-developed and well-nourished. She is cooperative. She does not have a sickly appearance. She does not appear ill. No distress.   HENT:   Head: Normocephalic.  Head is without raccoon's eyes and without Guardado's sign.   Contusion location to midline scalp.  Small palpable hematoma.  No other palpable deformities.  No skin changes or open wounds.  No hematotympanum   Eyes: EOM are normal.   Neck:   Normal range of motion.  Musculoskeletal:      Cervical back: Normal range of motion. No rigidity. No spinous process tenderness.     Neurological: She is alert and oriented to person, place, and time. GCS eye subscore is 4. GCS verbal subscore is 5. GCS motor subscore is 6.   Psychiatric: She has a normal mood and affect. Her speech is normal and behavior is normal.         ED Course   Procedures  Labs Reviewed - No data to display       Imaging Results    None          Medications - No data to display  Medical Decision Making  Patient presents with concern of head injury that occurred at 9:30 a.m. this morning.  She is since developed gradual onset of headache with intermittent nausea and photophobia.  No vomiting.  No neck pain or other injuries.  Afebrile with vitals WNL.  Patient otherwise very well-appearing on exam.    DDx:  Including but not limited to head contusion, concussion, migraine, tension               ED Course as of 12/15/23 1532   Fri Dec 15, 2023   1529 [place] head CT reviewed and discussed with patient.  With careful consideration, do have significantly lower concern for acute cranial or intracranial complication.  Patient does agree to defer CT.  Concussion precautions were discussed.  Patient encouraged Tylenol/Motrin as needed, close monitoring, PCP follow-up.  ED return precautions were discussed at length.  Patient states her understanding and agrees with plan. [KS]      ED Course User Index  [KS] César Abbasi PA-C                           Clinical Impression:  Final diagnoses:  [S00.03XA] Contusion of scalp, initial encounter (Primary)          ED Disposition Condition    Discharge Stable          ED Prescriptions    None       Follow-up  Information       Follow up With Specialties Details Why Contact Info    Your Doctor                 César Abbasi PA-C  12/15/23 9708

## 2023-12-15 NOTE — ED TRIAGE NOTES
"Pt arrived with c/o head injury at 9 am today.  Pt states a metal pole accidentally hit her on the top of her head.  Denies any LOC.  Pt reports "knot to top of head."  Pt currently endorses dizziness, HA, and nausea.  +photophobia.  Pt denies any other visual changes, denies taking any blood thinners.  Pt took tylenol with no relief.  Pt answering questions appropriately, speaking in complete sentences, respirations even and unlabored.  Aao x 4.    "

## 2023-12-19 ENCOUNTER — HOSPITAL ENCOUNTER (EMERGENCY)
Facility: HOSPITAL | Age: 38
Discharge: HOME | End: 2023-12-19
Attending: EMERGENCY MEDICINE
Payer: MEDICAID

## 2023-12-19 VITALS
BODY MASS INDEX: 34.99 KG/M2 | OXYGEN SATURATION: 100 % | WEIGHT: 210 LBS | SYSTOLIC BLOOD PRESSURE: 114 MMHG | HEIGHT: 65 IN | RESPIRATION RATE: 16 BRPM | TEMPERATURE: 98 F | HEART RATE: 88 BPM | DIASTOLIC BLOOD PRESSURE: 69 MMHG

## 2023-12-19 DIAGNOSIS — S00.03XA CONTUSION OF SCALP, INITIAL ENCOUNTER: ICD-10-CM

## 2023-12-19 DIAGNOSIS — R51.9 ACUTE NONINTRACTABLE HEADACHE, UNSPECIFIED HEADACHE TYPE: Primary | ICD-10-CM

## 2023-12-19 PROCEDURE — 63600175 PHARM REV CODE 636 W HCPCS

## 2023-12-19 PROCEDURE — 96375 TX/PRO/DX INJ NEW DRUG ADDON: CPT

## 2023-12-19 PROCEDURE — 99285 EMERGENCY DEPT VISIT HI MDM: CPT | Mod: 25

## 2023-12-19 PROCEDURE — 96361 HYDRATE IV INFUSION ADD-ON: CPT

## 2023-12-19 PROCEDURE — 96374 THER/PROPH/DIAG INJ IV PUSH: CPT

## 2023-12-19 PROCEDURE — 63600175 PHARM REV CODE 636 W HCPCS: Performed by: PHYSICIAN ASSISTANT

## 2023-12-19 RX ORDER — METOCLOPRAMIDE HYDROCHLORIDE 5 MG/ML
10 INJECTION INTRAMUSCULAR; INTRAVENOUS
Status: COMPLETED | OUTPATIENT
Start: 2023-12-19 | End: 2023-12-19

## 2023-12-19 RX ORDER — KETOROLAC TROMETHAMINE 30 MG/ML
15 INJECTION, SOLUTION INTRAMUSCULAR; INTRAVENOUS
Status: COMPLETED | OUTPATIENT
Start: 2023-12-19 | End: 2023-12-19

## 2023-12-19 RX ORDER — DIPHENHYDRAMINE HYDROCHLORIDE 50 MG/ML
25 INJECTION INTRAMUSCULAR; INTRAVENOUS
Status: COMPLETED | OUTPATIENT
Start: 2023-12-19 | End: 2023-12-19

## 2023-12-19 RX ORDER — KETOROLAC TROMETHAMINE 30 MG/ML
30 INJECTION, SOLUTION INTRAMUSCULAR; INTRAVENOUS
Status: DISCONTINUED | OUTPATIENT
Start: 2023-12-19 | End: 2023-12-19

## 2023-12-19 RX ADMIN — SODIUM CHLORIDE, POTASSIUM CHLORIDE, SODIUM LACTATE AND CALCIUM CHLORIDE 1000 ML: 600; 310; 30; 20 INJECTION, SOLUTION INTRAVENOUS at 01:12

## 2023-12-19 RX ADMIN — METOCLOPRAMIDE 10 MG: 5 INJECTION, SOLUTION INTRAMUSCULAR; INTRAVENOUS at 01:12

## 2023-12-19 RX ADMIN — DIPHENHYDRAMINE HYDROCHLORIDE 25 MG: 50 INJECTION, SOLUTION INTRAMUSCULAR; INTRAVENOUS at 01:12

## 2023-12-19 RX ADMIN — KETOROLAC TROMETHAMINE 15 MG: 30 INJECTION, SOLUTION INTRAMUSCULAR; INTRAVENOUS at 02:12

## 2023-12-19 NOTE — Clinical Note
"Lindsay Sneed (Nicole) was seen and treated in our emergency department on 12/19/2023.  She may return to work on 12/21/2023.       If you have any questions or concerns, please don't hesitate to call.       RN    "

## 2023-12-19 NOTE — ED PROVIDER NOTES
"Encounter Date: 2023       History     Chief Complaint   Patient presents with    Headache     HA that travels down neck to shoulders. Pt was dx with concussion on Friday after being struck in head at work.     38-year-old female returns to ED with concern of continued headache after head contusion that occurred on 12/15.  Seen in ED that day where CTs of head were deferred.  She was educated on concussion symptoms and discharged home with Zofran.  She does report Zofran has helped to manage her nausea but she continues to have headaches and photophobia.  She does admit headaches feel similar to previous migraines.  Headache is not "worst headache ever".  No visual changes, numbness, focal weakness, vomiting, nasal congestion, cough, ear pain.  No other acute complaints at this time.    The history is provided by the patient.     Review of patient's allergies indicates:   Allergen Reactions    Adhesive     Bactrim [sulfamethoxazole-trimethoprim]      No past medical history on file.  Past Surgical History:   Procedure Laterality Date    BLADDER SURGERY      due to fistula     SECTION      HYSTERECTOMY      TUBAL LIGATION       No family history on file.     Review of Systems   Constitutional:  Negative for fever.   HENT:  Negative for congestion and sore throat.    Eyes:  Positive for photophobia. Negative for visual disturbance.   Respiratory:  Negative for cough and shortness of breath.    Cardiovascular:  Negative for chest pain.   Gastrointestinal:  Negative for abdominal pain, diarrhea, nausea and vomiting.   Musculoskeletal:  Positive for neck pain. Negative for back pain and neck stiffness.   Skin:  Negative for wound.   Neurological:  Positive for headaches. Negative for dizziness, weakness, light-headedness and numbness.       Physical Exam     Initial Vitals [23 1247]   BP Pulse Resp Temp SpO2   123/76 105 18 98.4 °F (36.9 °C) 99 %      MAP       --         Physical Exam    Nursing note " and vitals reviewed.  Constitutional: She appears well-developed and well-nourished. She is cooperative. She does not have a sickly appearance. She does not appear ill. No distress.   Resting comfortably on bed, no apparent distress   HENT:   Head: Normocephalic and atraumatic. Head is without raccoon's eyes and without Guardado's sign.   Contusion location to midline scalp with no physical or palpable deformities. No wounds   Eyes: EOM are normal.   Neck: Neck supple.   Generalized tenderness reported to bilateral cervical paraspinal muscle.  No midline bony tenderness with no bony palpable step-offs.  Appropriate sensation and movement of bilateral upper extremities   Normal range of motion.  Cardiovascular:  Normal rate, regular rhythm and normal heart sounds.           Pulmonary/Chest: Effort normal and breath sounds normal.   Abdominal: Abdomen is soft.   Musculoskeletal:         General: No tenderness.      Cervical back: Normal range of motion and neck supple.     Neurological: She is alert and oriented to person, place, and time. She is not disoriented. GCS eye subscore is 4. GCS verbal subscore is 5. GCS motor subscore is 6.   Skin: Skin is warm and dry.   Psychiatric: She has a normal mood and affect. Her speech is normal and behavior is normal. Thought content normal.         ED Course   Procedures  Labs Reviewed - No data to display       Imaging Results              CT Cervical Spine Without Contrast (Final result)  Result time 12/19/23 13:56:18      Final result by Itz Moran MD (12/19/23 13:56:18)                   Impression:      No acute traumatic pathology identified      Electronically signed by: Itz Moran MD  Date:    12/19/2023  Time:    13:56               Narrative:    EXAMINATION:  CT CERVICAL SPINE WITHOUT CONTRAST    CLINICAL HISTORY:  Neck trauma, midline tenderness (Age 16-64y);    TECHNIQUE:  Low dose axial images, sagittal and coronal reformations were performed though the  cervical spine.  Contrast was not administered.    COMPARISON:  None    FINDINGS:  CT cervical spine demonstrates straightening of the usual cervical lordosis.  Body heights and disc space heights are maintained.  No fracture identified.  Incidental vertebral hemangioma is seen at C6 vertebral body.  No prevertebral soft tissue swelling or paravertebral hematoma is seen.                                       CT Head Without Contrast (Final result)  Result time 12/19/23 13:42:35      Final result by Riley Bearden Jr., MD (12/19/23 13:42:35)                   Impression:      Normal      Electronically signed by: Riley Flores Jr  Date:    12/19/2023  Time:    13:42               Narrative:    EXAMINATION:  CT HEAD WITHOUT CONTRAST    CLINICAL HISTORY:  Head trauma, moderate-severe;    TECHNIQUE:  Low dose axial images were obtained through the head.  Coronal and sagittal reformations were also performed. Contrast was not administered.    COMPARISON:  None.    FINDINGS:  The CSF spaces, brain parenchyma, and bony calvarium are intact.There is no evidence of hemorrhage, mass, or acute infarct.    The visualized paranasal sinuses are clear.                                       Medications   metoclopramide injection 10 mg (10 mg Intravenous Given 12/19/23 1324)   diphenhydrAMINE injection 25 mg (25 mg Intravenous Given 12/19/23 1324)   lactated ringers bolus 1,000 mL (1,000 mLs Intravenous New Bag 12/19/23 1324)   ketorolac injection 15 mg (15 mg Intravenous Given 12/19/23 1448)     Medical Decision Making  Patient returns ED with concern of continued headache, photophobia and intermittent nausea since head contusion that occurred 4 days ago.  No vomiting.  Afebrile with vitals WNL.  Patient otherwise in no distress on exam.    DDx:  Including but not limited to concussion, migraine, tension, cluster, headache, viral, less likely intracranial hemorrhage, skull fracture, cerebral contusion                 ED  Course as of 12/19/23 1534   Tue Dec 19, 2023   1357 CT Head Without Contrast  No acute cranial or intracranial findings noted per Radiology review [KS]   1359 CT Cervical Spine Without Contrast  No acute fractures per Radiology review [KS]   1532 Patient is reporting improvement of headache with ED intervention.  She continues remained very well-appearing with stable vitals.  Will plan to continue with conservative care.  She will continue her home Zofran as needed, Tylenol/Motrin, rest, PCP follow-up.  ED return precautions were discussed.  Patient states her understanding and agrees with plan. [KS]      ED Course User Index  [KS] César Abbasi PA-C                           Clinical Impression:  Final diagnoses:  [R51.9] Acute nonintractable headache, unspecified headache type (Primary)  [S00.03XA] Contusion of scalp, initial encounter          ED Disposition Condition    Discharge Stable          ED Prescriptions    None       Follow-up Information       Follow up With Specialties Details Why Contact Info Additional Information    Your Doctor         [facility]   [address] [place] [address], [address]  [facility] [address] [phone] Please park in [address] C or D and use Rosie lopez. Take Medical Office Bldg. elevators.             César Abbasi PA-C  12/19/23 1534

## 2023-12-19 NOTE — ED NOTES
Pt arrive from home complains of HA that travels from neck to to shoulder. Pt suffered a concussion after being hit in head with metal pole. Pt appears to be comfortable no distress noted.      APPEARANCE: Alert, oriented and in no acute distress.  CARDIAC: Normal radial pulse.   PERIPHERAL VASCULAR: peripheral pulses present. Normal cap refill. No edema. Warm to touch.    RESPIRATORY:Normal rate and effort, breath sounds clear bilaterally throughout chest. Respirations are equal and unlabored no obvious signs of distress.  GASTRO: soft, bowel sounds normal, no tenderness, no abdominal distention.  MUSC: Full ROM. No bony tenderness or soft tissue tenderness. No obvious deformity.  SKIN: Skin is warm and dry, normal skin turgor, mucous membranes moist.  NEURO: 5/5 strength major flexors/extensors bilaterally. Sensory intact to light touch bilaterally. [place] coma scale: eyes open spontaneously-4, oriented & converses-5, obeys commands-6. No neurological abnormalities.   MENTAL STATUS: awake, alert and aware of environment.  EYE: PERRL, both eyes: pupils brisk and reactive to light. Normal size.

## 2023-12-20 ENCOUNTER — TELEPHONE (OUTPATIENT)
Dept: FAMILY MEDICINE | Facility: HOSPITAL | Age: 38
End: 2023-12-20
Payer: MEDICAID

## 2023-12-20 NOTE — TELEPHONE ENCOUNTER
----- Message from Alma Valles sent at 12/20/2023 11:13 AM CST -----  Regarding: return call  Type: return Call    Who Called: pt  Would the patient rather a call back or a response via [facility]? call  Best Call Back Number: [phone]  Additional Information: pt stated she received the call but the phone hung up

## 2023-12-21 ENCOUNTER — OFFICE VISIT (OUTPATIENT)
Dept: FAMILY MEDICINE | Facility: HOSPITAL | Age: 38
End: 2023-12-21
Payer: MEDICAID

## 2023-12-21 VITALS
HEART RATE: 80 BPM | SYSTOLIC BLOOD PRESSURE: 113 MMHG | DIASTOLIC BLOOD PRESSURE: 77 MMHG | HEIGHT: 65 IN | WEIGHT: 207.44 LBS | BODY MASS INDEX: 34.56 KG/M2

## 2023-12-21 DIAGNOSIS — Z00.00 ENCOUNTER FOR MEDICAL EXAMINATION TO ESTABLISH CARE: Primary | ICD-10-CM

## 2023-12-21 DIAGNOSIS — F41.9 ANXIETY: ICD-10-CM

## 2023-12-21 DIAGNOSIS — S06.0X0D CONCUSSION WITHOUT LOSS OF CONSCIOUSNESS, SUBSEQUENT ENCOUNTER: ICD-10-CM

## 2023-12-21 DIAGNOSIS — Z12.31 SCREENING MAMMOGRAM FOR BREAST CANCER: ICD-10-CM

## 2023-12-21 PROCEDURE — 99214 OFFICE O/P EST MOD 30 MIN: CPT | Performed by: STUDENT IN AN ORGANIZED HEALTH CARE EDUCATION/TRAINING PROGRAM

## 2023-12-21 RX ORDER — DICLOFENAC SODIUM 10 MG/G
2 GEL TOPICAL 4 TIMES DAILY
Qty: 150 G | Refills: 3 | Status: SHIPPED | OUTPATIENT
Start: 2023-12-21 | End: 2024-01-29

## 2023-12-21 RX ORDER — SERTRALINE HYDROCHLORIDE 25 MG/1
25 TABLET, FILM COATED ORAL DAILY
Qty: 30 TABLET | Refills: 11 | Status: SHIPPED | OUTPATIENT
Start: 2023-12-21 | End: 2024-01-10

## 2023-12-21 NOTE — LETTER
December 21, 2023      [facility]  [address], [address]  [place] [address]  Phone: [phone]  Fax: [phone]       Patient: Lindsay Sneed   YOB: 1985  Date of Visit: 12/21/2023    To Whom It May Concern:    Nichole Sneed  was at [facility] on 12/21/2023. The patient may return to work/school on 01/02/24 with no restrictions. If you have any questions or concerns, or if I can be of further assistance, please do not hesitate to contact me.    Sincerely,    Rocky Vaughan MD

## 2023-12-21 NOTE — PROGRESS NOTES
"\[facility]\"" Family Medicine  History & Physical    SUBJECTIVE:     Chief Complaint:   Chief Complaint   Patient presents with    Follow-up     ED    Establish Care     Head injury 6 days ago.  Hit in the head with a metal pole.   Went to ED Friday, back on Tuesday.        History of Present Illness:  38 y.o. female who  has no past medical history on file. presents to clinic today to establish care. She endorses hx of previously tx'd Anxiety, OCD, PTSD, and PCOS, but is presenting today for follow up of concussion dx in ED following incident on 12/15/23. She states while at work at the [employer]. While trying to maneuver a cart back up a few stairs into the hotel, she pressed down on the cart which fell back onto her and struck her head. Immediately had no complication or LoC; however, after a few hours she began to notice HA and nausea which prompted her to be eval'd in the ER. She unfortunately left prior to full evaluation with imaging 2/2 her anxiety. She returned to the ED on 12/19 and had further eval with CT of her head and C-spine which only revealed an incidental hematoma at c-6 but had otherwise unremarkable w/u. Currently alternating tylenol and ibuprofen for HA which has improved and is having improved nausea and photophobia. Denies any additional sx's including numbness, tingling, weakness, amnesia, or LoC. Pt has additional concern regarding her anxiety at this time. Reports prolonged hx under care of Psychiatrist related to issues stemming from situations leading to PTSD. Endorses treatment included "multiple pills" but is uncertain which though reports most benefit from xanax monotherapy. Anxiety manifested in increased worry, decreased appetite, and sleep. She denies any concern for current safety as well as SI/HI. Denies any current tobacco, EtOH, or substance use. She is interested in referral for Psychiatry/Psychology as well as restarting medication.    Allergies:  Review of patient's allergies " indicates:   Allergen Reactions    Adhesive     Bactrim [sulfamethoxazole-trimethoprim]        Home Medications:  Current Outpatient Medications on File Prior to Visit   Medication Sig    ondansetron (ZOFRAN-ODT) 4 MG TbDL Take 1 tablet (4 mg total) by mouth every 6 (six) hours as needed (nausea).    albuterol (PROVENTIL/VENTOLIN HFA) 90 mcg/actuation inhaler Inhale 1-2 puffs into the lungs every 6 (six) hours as needed for Wheezing. Rescue    hydrOXYzine HCL (ATARAX) 25 MG tablet Take 1 tablet (25 mg total) by mouth 3 (three) times daily as needed for Anxiety. (Patient not taking: Reported on 2023)    LIDOcaine (LIDODERM) 5 % Place 1 patch onto the skin once daily. Remove & Discard patch within 12 hours or as directed by MD (Patient not taking: Reported on 2023)     No current facility-administered medications on file prior to visit.       No past medical history on file.  Past Surgical History:   Procedure Laterality Date    BLADDER SURGERY      due to fistula     SECTION      HYSTERECTOMY      TUBAL LIGATION       No family history on file.  Social History     Tobacco Use    Smoking status: Former     Types: Cigarettes    Smokeless tobacco: Never        Review of Systems   Constitutional:  Negative for chills, fever and malaise/fatigue.   HENT:  Negative for congestion, sinus pain and sore throat.    Eyes:  Positive for photophobia. Negative for blurred vision, double vision and pain.   Respiratory:  Negative for cough, sputum production and shortness of breath.    Cardiovascular:  Negative for chest pain, palpitations and leg swelling.   Gastrointestinal:  Positive for nausea. Negative for abdominal pain, constipation, diarrhea and vomiting.   Genitourinary:  Negative for dysuria and hematuria.   Musculoskeletal:  Negative for myalgias.   Skin:  Negative for rash.   Neurological:  Positive for headaches. Negative for dizziness, tingling, tremors, sensory change, speech change, focal weakness,  "loss of consciousness and weakness.   Psychiatric/Behavioral:  The patient is nervous/anxious.         OBJECTIVE:     Vital Signs:  Pulse: 80 (12/21/23 0921)  BP: 113/77 (12/21/23 0921)    Physical Exam  Vitals reviewed.   Constitutional:       General: She is not in acute distress.  HENT:      Head: Normocephalic and atraumatic.      Comments: Minimal non-tender area of edema noted over scalp but no laceration     Right Ear: External ear normal.      Left Ear: External ear normal.      Nose: Nose normal.   Eyes:      Conjunctiva/sclera: Conjunctivae normal.      Pupils: Pupils are equal, round, and reactive to light.   Cardiovascular:      Rate and Rhythm: Normal rate and regular rhythm.      Pulses: Normal pulses.      Heart sounds: Normal heart sounds. No murmur heard.     No friction rub. No gallop.   Pulmonary:      Effort: Pulmonary effort is normal.      Breath sounds: Normal breath sounds. No wheezing, rhonchi or rales.   Abdominal:      General: Bowel sounds are normal.      Palpations: There is no mass.      Tenderness: There is no abdominal tenderness.   Musculoskeletal:         General: No swelling or tenderness. Normal range of motion.      Cervical back: Normal range of motion.   Lymphadenopathy:      Cervical: No cervical adenopathy.   Skin:     General: Skin is warm and dry.      Findings: No rash.   Neurological:      General: No focal deficit present.      Mental Status: She is alert and oriented to person, place, and time.      Cranial Nerves: No cranial nerve deficit.      Sensory: No sensory deficit.      Motor: No weakness.      Coordination: Coordination normal.      Gait: Gait normal.      Deep Tendon Reflexes: Reflexes normal.         Laboratory:  No results found for: "HGBA1C"    A/P:  Lindsay was seen today for follow-up and establish care.Discussed recent incident resulting in concussion and encouraged cnt sx monitoring and rest which has resulted in improvement of sx thus far. Will " provide pt with voltaren for residual neck pain as well as work excuse provided for missed time. Also discussed hx of mental health disorders and tx options moving forward. GAD7= 18. Pt amenable to referral for Psych and counseling as well as starting medication at this time after risk/benefits discussed. Will complete screening blood work at this time and send referral for mammogram as well as OBGYN as pt would prefer to follow with specialist. Encouraged to f/u as needed but to return in 6wk for medication adjustment.     Diagnoses and all orders for this visit:    Encounter for medical examination to establish care  -     CBC Auto Differential; Future  -     Comprehensive Metabolic Panel; Future  -     TSH; Future  -     Lipid Panel; Future  -     Hemoglobin A1C; Future  -     HIV 1/2 Ag/Ab (4th Gen); Future  -     Hepatitis C Antibody; Future  -     Mammo Digital Screening Bilat; Future    Anxiety  -     Comprehensive Metabolic Panel; Future  -     TSH; Future  -     sertraline (ZOLOFT) 25 MG tablet; Take 1 tablet (25 mg total) by mouth once daily.    Screening mammogram for breast cancer  -     Mammo Digital Screening Bilat; Future  -     Ambulatory referral/consult to Obstetrics / Gynecology; Future    Concussion without loss of consciousness, subsequent encounter  -     diclofenac sodium (VOLTAREN) 1 % Gel; Apply 2 g topically 4 (four) times daily.    RTC in 6wks with PCP     Rocky Vaughan MD  [facility], PGY-3  12/21/2023

## 2023-12-26 ENCOUNTER — TELEPHONE (OUTPATIENT)
Dept: FAMILY MEDICINE | Facility: HOSPITAL | Age: 38
End: 2023-12-26
Payer: MEDICAID

## 2024-01-03 ENCOUNTER — HOSPITAL ENCOUNTER (OUTPATIENT)
Dept: RADIOLOGY | Facility: HOSPITAL | Age: 39
Discharge: HOME | End: 2024-01-03
Attending: STUDENT IN AN ORGANIZED HEALTH CARE EDUCATION/TRAINING PROGRAM
Payer: MEDICAID

## 2024-01-03 DIAGNOSIS — Z12.31 SCREENING MAMMOGRAM FOR BREAST CANCER: ICD-10-CM

## 2024-01-03 DIAGNOSIS — Z00.00 ENCOUNTER FOR MEDICAL EXAMINATION TO ESTABLISH CARE: ICD-10-CM

## 2024-01-03 PROCEDURE — 77067 SCR MAMMO BI INCL CAD: CPT | Mod: 26,,, | Performed by: RADIOLOGY

## 2024-01-03 PROCEDURE — 77067 SCR MAMMO BI INCL CAD: CPT | Mod: TC

## 2024-01-03 PROCEDURE — 77063 BREAST TOMOSYNTHESIS BI: CPT | Mod: 26,,, | Performed by: RADIOLOGY

## 2024-01-10 ENCOUNTER — OFFICE VISIT (OUTPATIENT)
Dept: FAMILY MEDICINE | Facility: HOSPITAL | Age: 39
End: 2024-01-10
Payer: MEDICAID

## 2024-01-10 VITALS
SYSTOLIC BLOOD PRESSURE: 121 MMHG | DIASTOLIC BLOOD PRESSURE: 76 MMHG | HEIGHT: 65 IN | WEIGHT: 204.13 LBS | TEMPERATURE: 99 F | BODY MASS INDEX: 34.01 KG/M2 | OXYGEN SATURATION: 99 % | HEART RATE: 87 BPM

## 2024-01-10 DIAGNOSIS — R42 DIZZINESS: Primary | ICD-10-CM

## 2024-01-10 DIAGNOSIS — F41.9 ANXIETY: ICD-10-CM

## 2024-01-10 DIAGNOSIS — G44.309 POST-TRAUMATIC HEADACHE, NOT INTRACTABLE, UNSPECIFIED CHRONICITY PATTERN: ICD-10-CM

## 2024-01-10 PROCEDURE — 99213 OFFICE O/P EST LOW 20 MIN: CPT

## 2024-01-10 RX ORDER — NAPROXEN 500 MG/1
500 TABLET ORAL
Qty: 30 TABLET | Refills: 0 | Status: SHIPPED | OUTPATIENT
Start: 2024-01-10 | End: 2024-02-09

## 2024-01-10 RX ORDER — MECLIZINE HCL 12.5 MG 12.5 MG/1
12.5 TABLET ORAL 3 TIMES DAILY PRN
Qty: 30 TABLET | Refills: 0 | Status: SHIPPED | OUTPATIENT
Start: 2024-01-10 | End: 2024-01-29

## 2024-01-10 RX ORDER — SERTRALINE HYDROCHLORIDE 25 MG/1
50 TABLET, FILM COATED ORAL DAILY
Qty: 60 TABLET | Refills: 11 | Status: SHIPPED | OUTPATIENT
Start: 2024-01-10 | End: 2025-01-09

## 2024-01-11 NOTE — PROGRESS NOTES
I assume primary medical responsibility for this patient. I have reviewed the history, physical, and assessement & treatment plan with the resident and agree that the care is reasonable and necessary. This service has been performed by a resident without the presence of a teaching physician under the primary care exception. If necessary, an addendum of additional findings or evaluation beyond the resident documentation will be noted below.    Agree with current conservative plan for what appears to be post concussive syndrome from incident on 12/15/2023. Given that symptoms remains consistent since then will manage symptoms alone as detailed including an increase in zoloft to optimize mental health. My benefit from further neuropsych testing in the future as well as an MRI if symptoms persist at follow up.

## 2024-01-11 NOTE — PROGRESS NOTES
[facility] FAM[facility] CLINIC NOTE  Follow-up Visit      SUBJECTIVE:     Patient: Lindsay Sneed is a 38 y.o. female who presents follow up in clinic for anxiety, headache, and dizziness.    Chief Compliant:   Chief Complaint   Patient presents with    Follow-up       History of Present Illness:  Anxiety:   The patient was started on Zoloft 25 mg at the last visit for anxiety.  She states that this has not helped her symptoms, she still endorses decreased sleep, feeling on edge, and feeling angry.  She suggested that Xanax was under the only medications that it helped her in the past.  We discussed that benzodiazepines were not the 1st line treatment for anxiety and the there was still space available to increase on her Zoloft and other anti anxiolytic medications that could be tried.    Headache:   The patient states that she still getting headaches following her trauma at work.  She says that this started the top of the head and radiate down her neck and shoulders.  She denies any sensitivity to light or noise and states that she has been taking Tylenol, Motrin, and Voltaren gel to help with the but does not help much.  She denies any changes in vision or changes in hearing she also denies any loss of consciousness.    Dizziness:  The patient states that she still gets some dizziness that is usually precipitated by her bending over to pick something up.  She states that this began after her accident.  And that this can usually cause her to start feeling nauseous and she gets a sensation that the room is spinning.    Review of Systems   Constitutional:  Negative for chills and fever.   HENT:  Negative for hearing loss.    Eyes:  Negative for photophobia.   Respiratory:  Negative for shortness of breath.    Cardiovascular:  Negative for chest pain.   Gastrointestinal:  Positive for nausea. Negative for vomiting.   Neurological:  Positive for dizziness and headaches.       OBJECTIVE:     Vital Signs (Most  "Recent)  Vitals:    01/10/24 1454   BP: 121/76   Pulse: 87   Temp: 98.6 °F (37 °C)   TempSrc: Oral   SpO2: 99%   Weight: 92.6 kg (204 lb 2.3 oz)   Height: 5' 5" (1.651 m)     BMI: Body mass index is 33.97 kg/m².     Physical Exam:  Physical Exam  HENT:      Head: Normocephalic.      Right Ear: External ear normal.      Left Ear: External ear normal.      Nose: Nose normal.   Eyes:      Extraocular Movements: Extraocular movements intact.      Pupils: Pupils are equal, round, and reactive to light.   Cardiovascular:      Pulses: Normal pulses.      Heart sounds: Normal heart sounds.   Pulmonary:      Effort: Pulmonary effort is normal. No respiratory distress.      Breath sounds: Normal breath sounds. No wheezing.   Musculoskeletal:         General: Normal range of motion.      Cervical back: Normal range of motion.   Skin:     General: Skin is warm and dry.   Neurological:      Mental Status: She is alert.      Motor: No weakness.      Coordination: Coordination normal.      Gait: Gait normal.      Comments: [place]-Hallpike maneuver attempted, but aborted as patient became dizzy when laid flat.   Psychiatric:         Mood and Affect: Mood normal.          ASSESSMENT:   Lindsay Sneed is a 38 y.o. female who presents to clinic to for follow up.  Her anxiety appears uncontrolled given herself report and elevated north 7 in clinic.  There is room to increase on her Zoloft and there are other options that could be tried as well.  Her headaches appear secondary to her trauma however, CT done during her acute phase revealed no abnormalities.  She also does not endorse any overtly concerning signs at this time.  Her dizziness appears more positional given it usually occurs whenever she bends over to pick something up or lays back and this precipitates her nausea.    1. Dizziness    2. Anxiety    3. Post-traumatic headache, not intractable, unspecified chronicity pattern         PLAN:   We will discontinue Zofran and start " meclizine for dizziness and nausea  We will increase Zoloft from 25 mg to 50 mg per day  We will prescribe naproxen 500 mg for her headaches  We will follow up with patient in 2 weeks to focus mostly on headaches and how to best increase her functional status so she can return to work    Dizziness  -     meclizine (ANTIVERT) 12.5 mg tablet; Take 1 tablet (12.5 mg total) by mouth 3 (three) times daily as needed for Dizziness or Nausea.  Dispense: 30 tablet; Refill: 0    Anxiety  -     sertraline (ZOLOFT) 25 MG tablet; Take 2 tablets (50 mg total) by mouth once daily.  Dispense: 60 tablet; Refill: 11    Post-traumatic headache, not intractable, unspecified chronicity pattern  -     naproxen (NAPROSYN) 500 MG tablet; Take 1 tablet (500 mg total) by mouth as needed (for headache).  Dispense: 30 tablet; Refill: 0        Provided patient with anticipatory guidance and return precautions. Treatment plan discussed with patient, all questions answered, and patient acknowledged understanding and verbal agreement.      Follow-up in: 2 weeks; or sooner PRN if acute concerns arise.      Case discussed with Dr. Kerns    ________________________  Elkin Posadas Jr, MD  \[facility] [place] [facility]\"" Family Medicine PGY-1      This note was partially created using [facility] Voice Recognition software. Typographical and content errors may occur with this process. While efforts are made to detect and correct such errors, in some cases errors will persist. For this reason, wording in this document should be considered in the proper context and not strictly verbatim.

## 2024-01-29 ENCOUNTER — OFFICE VISIT (OUTPATIENT)
Dept: FAMILY MEDICINE | Facility: HOSPITAL | Age: 39
End: 2024-01-29
Payer: MEDICAID

## 2024-01-29 VITALS
DIASTOLIC BLOOD PRESSURE: 73 MMHG | SYSTOLIC BLOOD PRESSURE: 104 MMHG | WEIGHT: 206.56 LBS | HEIGHT: 66 IN | BODY MASS INDEX: 33.2 KG/M2 | HEART RATE: 72 BPM

## 2024-01-29 DIAGNOSIS — R42 DIZZINESS: Primary | ICD-10-CM

## 2024-01-29 DIAGNOSIS — M54.50 ACUTE LOW BACK PAIN WITHOUT SCIATICA, UNSPECIFIED BACK PAIN LATERALITY: ICD-10-CM

## 2024-01-29 PROCEDURE — 99213 OFFICE O/P EST LOW 20 MIN: CPT

## 2024-01-29 RX ORDER — MECLIZINE HCL 12.5 MG 12.5 MG/1
12.5 TABLET ORAL 2 TIMES DAILY
Qty: 60 TABLET | Refills: 1 | Status: SHIPPED | OUTPATIENT
Start: 2024-01-29 | End: 2024-03-29

## 2024-01-29 NOTE — PROGRESS NOTES
"[facility]    Subjective:     Lindsay Sneed is a 39 y.o. year old female with PMHx of anxiety, headache, and dizziness. who presents to clinic for follow up for post concussive symptoms and for a recent fall.    At last visit, got Meclizine for dizziness and nausea, zoloft was increased to 50mg for anxiety, naproxen 500mg for headaches.    Post concussive:  The patient is reporting similar headache symptoms as previously reported since the onset of her accident.  She reports that they are unchanged.  She does endorse some improvement in photophobia however.  She also endorses some "brain fog" at this visit.  In terms of her dizziness, she feels that her symptoms are improved some with the change from Zofran to meclizine.  She states that these episodes are occurring about 3-4 times per day, unchanged from when her injury occurred.    Fall:  The patient states that on , she was walking [place] the stairs at the aquarium and slipped on the stairs.  She states that she landed on her gluteal region.  She endorses an aching pain at the site and states that it is made worse with prolonged sitting and better with standing.  She states that she has tried naproxen, Tylenol, Voltaren, ice, and hot water but this has not helped her symptoms.  She denies any bleeding, weakness, loss of bowel or bladder function, or radiation of pain.  She also denies any loss of consciousness or dizziness that precipitated this fall.       Review of patient's allergies indicates:   Allergen Reactions    Adhesive     Bactrim [sulfamethoxazole-trimethoprim]         No past medical history on file.   Past Surgical History:   Procedure Laterality Date    BLADDER SURGERY      due to fistula     SECTION      HYSTERECTOMY      TUBAL LIGATION        Family History   Problem Relation Age of Onset    Breast cancer Maternal Grandmother     Breast cancer Other       Social History     Tobacco Use    Smoking status: " "Former     Types: Cigarettes    Smokeless tobacco: Never   Substance Use Topics    Alcohol use: Not on file      Review of Systems   Constitutional:  Negative for chills and fever.   Respiratory:  Negative for shortness of breath.    Cardiovascular:  Negative for chest pain.   Gastrointestinal:  Negative for vomiting.     Objective:     Vitals:    01/29/24 1500   BP: 104/73   Pulse: 72   Weight: 93.7 kg (206 lb 9.1 oz)   Height: 5' 6" (1.676 m)     Physical Exam  Exam conducted with a chaperone present.   Constitutional:       General: She is not in acute distress.  HENT:      Head: Normocephalic.      Right Ear: External ear normal.      Left Ear: External ear normal.   Eyes:      Extraocular Movements: Extraocular movements intact.   Cardiovascular:      Heart sounds: Normal heart sounds.   Pulmonary:      Effort: Pulmonary effort is normal. No respiratory distress.      Breath sounds: Normal breath sounds.   Musculoskeletal:         General: Normal range of motion.      Cervical back: Normal range of motion. No swelling, deformity or tenderness.      Thoracic back: No swelling, deformity or tenderness.      Lumbar back: Tenderness present. No swelling, edema, deformity or lacerations.      Comments: Tenderness to palpation of the sacrum and paraspinal musculature  No bruising, hematomas, or discolorations appreciated on exam   Neurological:      Mental Status: She is alert.      Motor: No weakness.      Gait: Gait normal.      Comments: Chromo-Hallpike maneuver attempted, but aborted twice due to patient intolerance of procedure.       Assessment/Plan:     Lindsay Sneed is a 39 y.o. year old female who presents to clinic for postconcussive syndrome an acute fall.    1. Post concussive syndrome  The patient's symptoms appear slightly improved if not relatively the same.  Patient was educated on postconcussive syndrome, and that it could take months in order to returned to baseline.  At the moment there are " promising signs, including improvement in photophobia and dizziness, however other symptoms have not progressed significantly.  At this time we will continue with the following:   We will change meclizine from as needed to b.i.d. scheduled to assist with further control over dizziness  We will strongly consider starting propranolol for headaches and possibly neurology referral with further imaging  We will continue to monitor symptoms for any acute changes.  - meclizine (ANTIVERT) 12.5 mg tablet; Take 1 tablet (12.5 mg total) by mouth 2 (two) times daily.  Dispense: 60 tablet; Refill: 1    2. Acute low back pain without sciatica, unspecified back pain laterality  The patient's back pain was traumatic in origin.  However physical exam showed no obvious deformities.  Given the acuity of the patient's injury and lack of concerning findings including loss of sensation, loss of bowel bladder control, or radiation of the pain, conservative therapy appears to be the best course of action.  Patient encouraged to continue with naproxen and acetaminophen for pain control  If pain is not resolved at next visit, we will strongly consider imaging and/or physical therapy      Follow-up:1 month    Case discussed with staff: Chris Hughes    ________________________  Elkin Posadas Jr, MD  [facility] Family Medicine PGY-1      This note was partially created using [employer] Voice Recognition software. Typographical and content errors may occur with this process. While efforts are made to detect and correct such errors, in some cases errors will persist. For this reason, wording in this document should be considered in the proper context and not strictly verbatim.

## 2024-02-05 ENCOUNTER — OFFICE VISIT (OUTPATIENT)
Dept: OBSTETRICS AND GYNECOLOGY | Facility: CLINIC | Age: 39
End: 2024-02-05
Payer: MEDICAID

## 2024-02-05 ENCOUNTER — LAB VISIT (OUTPATIENT)
Dept: LAB | Facility: HOSPITAL | Age: 39
End: 2024-02-05
Attending: OBSTETRICS & GYNECOLOGY
Payer: MEDICAID

## 2024-02-05 VITALS
DIASTOLIC BLOOD PRESSURE: 83 MMHG | WEIGHT: 207.25 LBS | SYSTOLIC BLOOD PRESSURE: 114 MMHG | BODY MASS INDEX: 33.45 KG/M2

## 2024-02-05 DIAGNOSIS — Z90.710 POST HYSTERECTOMY MENOPAUSE SYNDROME: ICD-10-CM

## 2024-02-05 DIAGNOSIS — R10.2 PELVIC PAIN IN FEMALE: ICD-10-CM

## 2024-02-05 DIAGNOSIS — E89.40 POST HYSTERECTOMY MENOPAUSE SYNDROME: ICD-10-CM

## 2024-02-05 DIAGNOSIS — Z12.31 SCREENING MAMMOGRAM FOR BREAST CANCER: ICD-10-CM

## 2024-02-05 DIAGNOSIS — Z12.4 PAP SMEAR FOR CERVICAL CANCER SCREENING: Primary | ICD-10-CM

## 2024-02-05 DIAGNOSIS — Z01.419 WELL WOMAN EXAM WITH ROUTINE GYNECOLOGICAL EXAM: ICD-10-CM

## 2024-02-05 LAB
FSH SERPL-ACNC: 12.75 MIU/ML
TESTOST SERPL-MCNC: 68 NG/DL (ref 5–73)

## 2024-02-05 PROCEDURE — 36415 COLL VENOUS BLD VENIPUNCTURE: CPT | Performed by: OBSTETRICS & GYNECOLOGY

## 2024-02-05 PROCEDURE — 1159F MED LIST DOCD IN RCRD: CPT | Mod: CPTII,,, | Performed by: OBSTETRICS & GYNECOLOGY

## 2024-02-05 PROCEDURE — 83001 ASSAY OF GONADOTROPIN (FSH): CPT | Performed by: OBSTETRICS & GYNECOLOGY

## 2024-02-05 PROCEDURE — 3079F DIAST BP 80-89 MM HG: CPT | Mod: CPTII,,, | Performed by: OBSTETRICS & GYNECOLOGY

## 2024-02-05 PROCEDURE — 99213 OFFICE O/P EST LOW 20 MIN: CPT | Mod: PBBFAC,PO | Performed by: OBSTETRICS & GYNECOLOGY

## 2024-02-05 PROCEDURE — 88175 CYTOPATH C/V AUTO FLUID REDO: CPT | Performed by: OBSTETRICS & GYNECOLOGY

## 2024-02-05 PROCEDURE — 99999 PR PBB SHADOW E&M-EST. PATIENT-LVL III: CPT | Mod: PBBFAC,,, | Performed by: OBSTETRICS & GYNECOLOGY

## 2024-02-05 PROCEDURE — 99386 PREV VISIT NEW AGE 40-64: CPT | Mod: S$PBB,,, | Performed by: OBSTETRICS & GYNECOLOGY

## 2024-02-05 PROCEDURE — 84403 ASSAY OF TOTAL TESTOSTERONE: CPT | Performed by: OBSTETRICS & GYNECOLOGY

## 2024-02-05 PROCEDURE — 3008F BODY MASS INDEX DOCD: CPT | Mod: CPTII,,, | Performed by: OBSTETRICS & GYNECOLOGY

## 2024-02-05 PROCEDURE — 3074F SYST BP LT 130 MM HG: CPT | Mod: CPTII,,, | Performed by: OBSTETRICS & GYNECOLOGY

## 2024-02-05 NOTE — PROGRESS NOTES
HPI:  39 y.o.   OB History          2    Para   2    Term   2            AB        Living             SAB        IAB        Ectopic        Multiple        Live Births                  No LMP recorded. Patient has had a hysterectomy.   Patient here for her annual gynecologic exam.  She has no complaints at this time.    ROS:  GENERAL: No fever, chills, fatigability or weight loss.  SKIN: No rashes, itching or changes in color or texture of skin.  HEAD: No headaches or recent head trauma.  EYES: Visual acuity fine. No photophobia, ocular pain or diplopia.  EARS: Denies ear pain, discharge or vertigo.  NOSE: No loss of smell, no epistaxis or postnasal drip.  MOUTH & THROAT: No hoarseness or change in voice. No excessive gum bleeding.  NODES: Denies swollen glands.  CHEST: Denies KIM, cyanosis, wheezing, cough and sputum production.  CARDIOVASCULAR: Denies chest pain, PND, orthopnea or reduced exercise tolerance.  ABDOMEN: Appetite fine. No weight loss. Denies diarrhea, abdominal pain, hematemesis or blood in stool.  URINARY: No flank pain, dysuria or hematuria.  PERIPHERAL VASCULAR: No claudication or cyanosis.  MUSCULOSKELETAL: No joint stiffness or swelling. Denies back pain.  NEUROLOGIC: No history of seizures, paralysis, alteration of gait or coordination.    PE:   /83   Wt 94 kg (207 lb 3.7 oz)   BMI 33.45 kg/m²   APPEARANCE: Well nourished, well developed, in no acute distress.  NECK: Neck symmetric without masses or thyromegaly.  NODES: No inguinal lymph node enlargement.  ABDOMEN: Soft. No tenderness or masses. No hepatosplenomegaly. No hernias.  BREASTS: Symmetrical, no skin changes or visible lesions. No palpable masses, nipple discharge or adenopathy bilaterally.  PELVIC: Normal external female genitalia without lesions. Normal hair distribution. Adequate perineal body, normal urethral meatus. Vagina moist and well rugated without lesions or discharge. No significant cystocele or  rectocele. Uterus and cervix surgically absent. Bimanual exam revealed no masses, tenderness or abnormality.    Procedure:  Pap Smear    Assessment:  Normal Gynecologic Exam    Plan:  Mammogram and Colonoscopy as per current recommendations.   Return to clinic in one year or for any problems or complaints.  Pt had endometriosis, hyst, has R ovary. Gets pain  Was on myfembre  Nonteder   Labs for hair loss,  had normal tsh  Hromone, test  Pelvic us  2 section then hyst  Scar surgery, fistula bladder,   Pt on zoloft, states not working, asked for other med,, rec calling primary

## 2024-02-06 ENCOUNTER — OFFICE VISIT (OUTPATIENT)
Dept: FAMILY MEDICINE | Facility: HOSPITAL | Age: 39
End: 2024-02-06
Payer: MEDICAID

## 2024-02-06 ENCOUNTER — TELEPHONE (OUTPATIENT)
Dept: OBSTETRICS AND GYNECOLOGY | Facility: CLINIC | Age: 39
End: 2024-02-06
Payer: MEDICAID

## 2024-02-06 ENCOUNTER — HOSPITAL ENCOUNTER (OUTPATIENT)
Dept: RADIOLOGY | Facility: HOSPITAL | Age: 39
Discharge: HOME | End: 2024-02-06
Payer: MEDICAID

## 2024-02-06 ENCOUNTER — HOSPITAL ENCOUNTER (OUTPATIENT)
Dept: RADIOLOGY | Facility: HOSPITAL | Age: 39
Discharge: HOME | End: 2024-02-06
Attending: OBSTETRICS & GYNECOLOGY
Payer: MEDICAID

## 2024-02-06 VITALS
HEART RATE: 71 BPM | DIASTOLIC BLOOD PRESSURE: 78 MMHG | HEIGHT: 66 IN | BODY MASS INDEX: 33.66 KG/M2 | WEIGHT: 209.44 LBS | SYSTOLIC BLOOD PRESSURE: 109 MMHG

## 2024-02-06 DIAGNOSIS — M53.3 TRAUMATIC COCCYDYNIA: ICD-10-CM

## 2024-02-06 DIAGNOSIS — F07.81 POST CONCUSSION SYNDROME: ICD-10-CM

## 2024-02-06 DIAGNOSIS — M53.3 TRAUMATIC COCCYDYNIA: Primary | ICD-10-CM

## 2024-02-06 DIAGNOSIS — R10.2 PELVIC PAIN IN FEMALE: ICD-10-CM

## 2024-02-06 PROCEDURE — 76830 TRANSVAGINAL US NON-OB: CPT | Mod: TC

## 2024-02-06 PROCEDURE — 99213 OFFICE O/P EST LOW 20 MIN: CPT | Mod: 25

## 2024-02-06 PROCEDURE — 76830 TRANSVAGINAL US NON-OB: CPT | Mod: 26,,, | Performed by: RADIOLOGY

## 2024-02-06 PROCEDURE — 72220 X-RAY EXAM SACRUM TAILBONE: CPT | Mod: TC,FY

## 2024-02-06 PROCEDURE — 72220 X-RAY EXAM SACRUM TAILBONE: CPT | Mod: 26,,, | Performed by: RADIOLOGY

## 2024-02-06 PROCEDURE — 76856 US EXAM PELVIC COMPLETE: CPT | Mod: 26,,, | Performed by: RADIOLOGY

## 2024-02-06 RX ORDER — AMITRIPTYLINE HYDROCHLORIDE 10 MG/1
10 TABLET, FILM COATED ORAL NIGHTLY PRN
Qty: 30 TABLET | Refills: 1 | Status: SHIPPED | OUTPATIENT
Start: 2024-02-06 | End: 2024-02-27

## 2024-02-06 RX ORDER — MELOXICAM 7.5 MG/1
7.5 TABLET ORAL DAILY
Qty: 14 TABLET | Refills: 0 | Status: SHIPPED | OUTPATIENT
Start: 2024-02-06 | End: 2024-02-20

## 2024-02-06 NOTE — PROGRESS NOTES
[facility]    Subjective:     Lindsay Sneed is a 39 y.o. year old female with PMHx of anxiety and postconcussive syndrome who presents to clinic for follow up for pain.    Coccydynia:  On , the patient was noted to have a slip and fall landing on her coccyx while they aquarium.  She presented with the symptoms at the last visit and conservative therapy was initially done.  Today she continues to express pain at the site of the accident.  She denies any loss of bowel bladder control, however she does endorse some positional numbness in her thighs.  She also denies any weakness and states that she is still able to ambulate.  She states that she is tried ice, heat, Tylenol, and Voltaren gel but these have not helped.    Postconcussive syndrome:   The patient continues to endorse a headache and feels that it is not improving.  She is currently being treated with naproxen and Tylenol.  Of note CT at the time of the accident that caused her syndrome was negative.    There are no problems to display for this patient.       Review of patient's allergies indicates:   Allergen Reactions    Adhesive     Bactrim [sulfamethoxazole-trimethoprim]         No past medical history on file.   Past Surgical History:   Procedure Laterality Date    BLADDER SURGERY      due to fistula     SECTION      HYSTERECTOMY      TUBAL LIGATION        Family History   Problem Relation Age of Onset    Breast cancer Maternal Grandmother     Breast cancer Other       Social History     Tobacco Use    Smoking status: Former     Types: Cigarettes    Smokeless tobacco: Never   Substance Use Topics    Alcohol use: Never      Review of Systems   Constitutional:  Negative for chills and fever.   Respiratory:  Negative for shortness of breath.    Cardiovascular:  Negative for chest pain.   Musculoskeletal:  Positive for back pain.   Neurological:  Positive for headaches.     Objective:     Vitals:    24 1359   BP: 109/78  "  Pulse: 71   Weight: 95 kg (209 lb 7 oz)   Height: 5' 6" (1.676 m)     Physical Exam  Constitutional:       General: She is not in acute distress.  HENT:      Head: Normocephalic.      Right Ear: External ear normal.      Left Ear: External ear normal.   Eyes:      Extraocular Movements: Extraocular movements intact.   Cardiovascular:      Heart sounds: Normal heart sounds.   Pulmonary:      Effort: Pulmonary effort is normal.      Breath sounds: Normal breath sounds.   Musculoskeletal:         General: Normal range of motion.      Cervical back: No tenderness or bony tenderness.        Back:       Comments: Range of motion to forward flexion restricted secondary to pain   Neurological:      Mental Status: She is alert.      Motor: No weakness.       Assessment/Plan:     Lindsay Sneed is a 39 y.o. year old female who presents to clinic for coccyx pain and postconcussive syndrome.    1. Traumatic coccydynia  The patient has suffered a slip and fall landing on her coccyx around 1 week ago.  She states that she has been adherent to conservative therapy and that it has not helped her.  There was tenderness to palpation of the sacrum and in the muscles adjacent to the sacrum.  She did not have any concerning signs however specifically no loss of bowel bladder control, no weakness in the lower extremities, and her loss of sensation appeared to be more positional than secondary to her trauma.  Plan:   We will get an x-ray of the patient's sacrum and coccyx to ensure no fractures at this time   We will prescribe the patient a short course of meloxicam as she is failed over-the-counter pain medication  The patient educated on the nature of this injury in the even if it is not fractured, it may take some time for the pain to completely resolve   The patient appeared frustrated during the examination, I acknowledged these frustrations and ensured the patient that we need to attempt lower threshold medications before " beginning more aggressive therapies or medications  - X-Ray Sacrum And Coccyx; Future  - meloxicam (MOBIC) 7.5 MG tablet; Take 1 tablet (7.5 mg total) by mouth once daily. for 14 days  Dispense: 14 tablet; Refill: 0    2. Post concussion syndrome  The patient feels that her headaches have not improved.  Her accident was approximately 1-1/2 months ago.  We have currently been doing conservative therapy with acetaminophen and naproxen have the patient does not feel that this is working for her.  Plan:  Given the patient's failure of conservative therapy, we will escalate to amitriptyline 10 mg nightly  Should the patient's symptoms not resolve we will consider more invasive imaging and possible referral to Neurology  The patient was educated that these syndromes can take time to resolve however, I ensured her that we will continue to provide her with care as best as we can to help alleviate her symptoms and resolve them completely in the future.  - amitriptyline (ELAVIL) 10 MG tablet; Take 1 tablet (10 mg total) by mouth nightly as needed for Insomnia.  Dispense: 30 tablet; Refill: 1     Follow-up:  1 month    ________________________  Elkin Posadas Jr, MD  [facility] Family Medicine PGY-1      This note was partially created using [employer] Voice Recognition software. Typographical and content errors may occur with this process. While efforts are made to detect and correct such errors, in some cases errors will persist. For this reason, wording in this document should be considered in the proper context and not strictly verbatim.

## 2024-02-07 ENCOUNTER — PATIENT MESSAGE (OUTPATIENT)
Dept: OBSTETRICS AND GYNECOLOGY | Facility: CLINIC | Age: 39
End: 2024-02-07
Payer: MEDICAID

## 2024-02-08 ENCOUNTER — TELEPHONE (OUTPATIENT)
Dept: OBSTETRICS AND GYNECOLOGY | Facility: CLINIC | Age: 39
End: 2024-02-08
Payer: MEDICAID

## 2024-02-08 ENCOUNTER — PATIENT MESSAGE (OUTPATIENT)
Dept: OBSTETRICS AND GYNECOLOGY | Facility: CLINIC | Age: 39
End: 2024-02-08
Payer: MEDICAID

## 2024-02-12 LAB
FINAL PATHOLOGIC DIAGNOSIS: NORMAL
Lab: NORMAL

## 2024-02-14 ENCOUNTER — PATIENT MESSAGE (OUTPATIENT)
Dept: OBSTETRICS AND GYNECOLOGY | Facility: CLINIC | Age: 39
End: 2024-02-14
Payer: MEDICAID

## 2024-02-14 RX ORDER — SPIRONOLACTONE 25 MG/1
25 TABLET ORAL DAILY
Qty: 30 TABLET | Refills: 1 | Status: SHIPPED | OUTPATIENT
Start: 2024-02-14 | End: 2025-02-13

## 2024-02-27 ENCOUNTER — TELEPHONE (OUTPATIENT)
Dept: HEPATOLOGY | Facility: HOSPITAL | Age: 39
End: 2024-02-27
Payer: MEDICAID

## 2024-02-27 NOTE — TELEPHONE ENCOUNTER
I called the patient back to discuss her x-ray results.  She identified herself and her date of birth.  We discussed that there was no fracture or dislocation noted on her x-ray.  She states that she still has some pain and took another fall following a dizzy spell while she was on her way to the gym.  She denies any head trauma with this event.  We also discussed her postconcussive syndrome.  She states that her dizziness is improving some, but is still present despite therapy with meclizine and amitriptyline.  Given the increased risk of serotonin syndrome with a combination of amitriptyline and sertraline, and the patient's overall lack of true improvement in her symptoms, I believe it would be prudent to discontinue amitriptyline. She does not endorse any new symptoms concerning for the development of serotonin syndrome. During our conversation, we also discussed her concerns with workmen's compensation.  She states that she still does not feel able to perform her duties at work.  Overall, it appears that the patient's postconcussive syndrome, dizziness, and generalized pain do not appear improved.  Given these factors, we will consider referral to physical therapy for both general evaluation and treatment and possibly vestibular rehab, referral to Neurology for this persistent dizziness following her head trauma with negative CT scans, and a referral to pain management given that the patient has failed more conservative therapy.  The patient also expressed concerns about weight loss during this discussion and inquired about Ozempic, however according to the last A1c she does not have diabetes so this may be difficult to acquire.  She was offered a referral to nutrition Services, but states that she is already tried this and it did not help.  Can explore further options with her in clinic. All questions were answered, and the patient expressed understanding.    ________________________  Elkin Posadas Jr, MD  [facility]  Family Medicine PGY-1    This note was partially created using [employer] Voice Recognition software. Typographical and content errors may occur with this process. While efforts are made to detect and correct such errors, in some cases errors will persist. For this reason, wording in this document should be considered in the proper context and not strictly verbatim.

## 2024-02-28 ENCOUNTER — HOSPITAL ENCOUNTER (OUTPATIENT)
Dept: RADIOLOGY | Facility: HOSPITAL | Age: 39
Discharge: HOME | End: 2024-02-28
Payer: MEDICAID

## 2024-02-28 ENCOUNTER — OFFICE VISIT (OUTPATIENT)
Dept: FAMILY MEDICINE | Facility: HOSPITAL | Age: 39
End: 2024-02-28
Payer: MEDICAID

## 2024-02-28 VITALS
SYSTOLIC BLOOD PRESSURE: 120 MMHG | BODY MASS INDEX: 34.12 KG/M2 | HEART RATE: 100 BPM | WEIGHT: 217.38 LBS | DIASTOLIC BLOOD PRESSURE: 77 MMHG | HEIGHT: 67 IN

## 2024-02-28 DIAGNOSIS — F07.81 POST CONCUSSION SYNDROME: Primary | ICD-10-CM

## 2024-02-28 DIAGNOSIS — S59.902A INJURY OF LEFT ELBOW, INITIAL ENCOUNTER: ICD-10-CM

## 2024-02-28 DIAGNOSIS — S89.92XA INJURY OF LEFT KNEE, INITIAL ENCOUNTER: ICD-10-CM

## 2024-02-28 DIAGNOSIS — M53.3 TRAUMATIC COCCYDYNIA: ICD-10-CM

## 2024-02-28 PROCEDURE — 99213 OFFICE O/P EST LOW 20 MIN: CPT | Mod: 25

## 2024-02-28 PROCEDURE — 73562 X-RAY EXAM OF KNEE 3: CPT | Mod: 26,LT,, | Performed by: STUDENT IN AN ORGANIZED HEALTH CARE EDUCATION/TRAINING PROGRAM

## 2024-02-28 PROCEDURE — 73562 X-RAY EXAM OF KNEE 3: CPT | Mod: TC,FY,LT

## 2024-02-28 RX ORDER — PROCHLORPERAZINE MALEATE 10 MG
10 TABLET ORAL 2 TIMES DAILY PRN
Qty: 60 TABLET | Refills: 0 | Status: SHIPPED | OUTPATIENT
Start: 2024-02-28 | End: 2024-03-29

## 2024-02-28 RX ORDER — METOCLOPRAMIDE 10 MG/1
10 TABLET ORAL 4 TIMES DAILY
Qty: 56 TABLET | Refills: 0 | Status: CANCELLED | OUTPATIENT
Start: 2024-02-28 | End: 2024-03-13

## 2024-02-28 NOTE — PROGRESS NOTES
[facility] Family Medicine    Subjective:     Lindsay Sneed is a 39 y.o. year old female with PMHx of anxiety and postconcussive syndrome who presents to clinic for continued post concussive pain and fall.     #postconcussive syndrome  Patient states that she hit her head on a metal object on December 15 2023 and has had postconcussive headaches since then. She has occasional dizzy spells, nausea, but no vomiting. She states that she has been having mild improvement of symptoms since last visit but is still having headaches approximately 2-3 times a day described as a throbbing pain. The episodes can be as short as an hour or as long as a few hours. Headaches being currently managed by mobic, and she has tried naproxen and tylenol with little to no relief.     #fall  Due to these post concussive symptoms patient has had occasional falls and most recently she fell on  onto the concrete in which she hit her left knee and her left elbow. She denies hitting her head, loss of consciousness, and states she still has mobility and strength of her left arm and leg. Patient has not had any imaging or workup for her left knee and elbow and today is the first time she is getting it evaluated. She does not have difficulty bearing weight on her left knee.     #traumatic coccydynia  Patient had a fall in January on her tailbone, was previously evaluated and shown to have no acute fracture. Patient still has coccyx pain with sitting but slowly improving and pain managed by NSAIDS.    There are no problems to display for this patient.       Review of patient's allergies indicates:   Allergen Reactions    Adhesive     Bactrim [sulfamethoxazole-trimethoprim]         No past medical history on file.   Past Surgical History:   Procedure Laterality Date    BLADDER SURGERY      due to fistula     SECTION      HYSTERECTOMY      TUBAL LIGATION        Family History   Problem Relation Age of Onset    Breast cancer Maternal Grandmother  "    Breast cancer Other       Social History     Tobacco Use    Smoking status: Former     Types: Cigarettes    Smokeless tobacco: Never   Substance Use Topics    Alcohol use: Never        Objective:     Vitals:    02/28/24 1452   BP: 120/77   Pulse: 100   Weight: 98.6 kg (217 lb 6 oz)   Height: 5' 7" (1.702 m)     BMI: Body mass index is 34.05 kg/m².    Physical Exam  Vitals and nursing note reviewed.   Constitutional:       General: She is not in acute distress.     Appearance: Normal appearance. She is obese. She is not ill-appearing or toxic-appearing.   HENT:      Head: Normocephalic and atraumatic.      Comments: No bruising, lacerations, or contusions on head     Mouth/Throat:      Mouth: Mucous membranes are moist.   Eyes:      General: No scleral icterus.     Extraocular Movements: Extraocular movements intact.      Pupils: Pupils are equal, round, and reactive to light.   Cardiovascular:      Rate and Rhythm: Normal rate and regular rhythm.      Pulses: Normal pulses.      Heart sounds: Normal heart sounds.   Pulmonary:      Effort: Pulmonary effort is normal.   Abdominal:      General: Abdomen is flat.   Musculoskeletal:      Cervical back: Normal range of motion.      Comments: Left elbow has superficial scrape,  5/5 strength on flexion and extension and no apparent laxity or bone displacement. Mildly tender to palpation    Left knee has superficial scrape, 5/5 strength on flexion and extension, no laxity on medial/lateral/anterior/posterior manipulation and no patellar dislocation. Mildly tender to palpation   Skin:     General: Skin is warm.   Neurological:      General: No focal deficit present.      Mental Status: She is alert and oriented to person, place, and time.           Assessment/Plan:     Lindsay Sneed is a 39 y.o. year old female PMHx of anxiety and postconcussive syndrome who presents to clinic for continued post concussive pain and fall.     1. Post concussion syndrome  Patient with " history of post concussive syndrome after hitting head on metal pole in December. Still having occasional headache and nausea not relieved by NSAIDs alone and patient not getting nausea relief from zofran. Will prescribe compazine and refer to neurology  - prochlorperazine (COMPAZINE) 10 MG tablet; Take 1 tablet (10 mg total) by mouth 2 (two) times daily as needed (headaches and nausea).  Dispense: 60 tablet; Refill: 0  - Ambulatory referral/consult to Neurology; Future    2. Injury of left knee, initial encounter  Fall on 2/14 onto her left knee and elbow, first encounter to be evaluated today. No laxity on either joints, tenderness to palpation. Will image at this visit  - X-Ray Knee 3 View Left; Future    3. Injury of left elbow, initial encounter  Fall on 2/14 onto her left knee and elbow, first encounter to be evaluated today. No laxity on either joints, tenderness to palpation. Will image at this visit  - X-Ray Elbow Complete Left; Future    4. Traumatic coccydynia  Had fall in January, no evidence of fracture on imaging. Improving mildly with NSAIDs      Follow-up:1 month f/u imaging and post concussive symptoms.     A total of 30 minutes was spent on patient care during this encounter which included chart review, examining the patient, formulating a treatment plan and documentation.      Medical decision making straight forward and not complex during this visit.     Case discussed with staff: Dr. Dianna Cornejo MD  [facility] Family Medicine, PGY-1  03/14/2024

## 2024-03-14 NOTE — PROGRESS NOTES
I assume primary medical responsibility for this patient. I have reviewed the history, physical, and assessment & treatment plan with the resident and agree that the care is reasonable and necessary. This service has been performed by a resident without the presence of a teaching physician under the primary care exception. If necessary, an addendum of additional findings or evaluation beyond the resident documentation will be noted below.        Nagi Bustamante Jr., DO    [facility] Medicine

## 2024-03-25 ENCOUNTER — OFFICE VISIT (OUTPATIENT)
Dept: NEUROLOGY | Facility: CLINIC | Age: 39
End: 2024-03-25
Payer: MEDICAID

## 2024-03-25 ENCOUNTER — TELEPHONE (OUTPATIENT)
Dept: NEUROLOGY | Facility: CLINIC | Age: 39
End: 2024-03-25
Payer: MEDICAID

## 2024-03-25 VITALS
WEIGHT: 211.63 LBS | HEART RATE: 79 BPM | HEIGHT: 67 IN | SYSTOLIC BLOOD PRESSURE: 108 MMHG | DIASTOLIC BLOOD PRESSURE: 62 MMHG | BODY MASS INDEX: 33.21 KG/M2

## 2024-03-25 DIAGNOSIS — G44.59 OTHER COMPLICATED HEADACHE SYNDROME: Primary | ICD-10-CM

## 2024-03-25 DIAGNOSIS — F07.81 POST CONCUSSION SYNDROME: ICD-10-CM

## 2024-03-25 PROCEDURE — 3074F SYST BP LT 130 MM HG: CPT | Mod: CPTII,,, | Performed by: INTERNAL MEDICINE

## 2024-03-25 PROCEDURE — 1159F MED LIST DOCD IN RCRD: CPT | Mod: CPTII,,, | Performed by: INTERNAL MEDICINE

## 2024-03-25 PROCEDURE — 99999 PR PBB SHADOW E&M-EST. PATIENT-LVL III: CPT | Mod: PBBFAC,,, | Performed by: INTERNAL MEDICINE

## 2024-03-25 PROCEDURE — 99213 OFFICE O/P EST LOW 20 MIN: CPT | Mod: PBBFAC | Performed by: INTERNAL MEDICINE

## 2024-03-25 PROCEDURE — 99205 OFFICE O/P NEW HI 60 MIN: CPT | Mod: S$PBB,,, | Performed by: INTERNAL MEDICINE

## 2024-03-25 PROCEDURE — 3008F BODY MASS INDEX DOCD: CPT | Mod: CPTII,,, | Performed by: INTERNAL MEDICINE

## 2024-03-25 PROCEDURE — 3078F DIAST BP <80 MM HG: CPT | Mod: CPTII,,, | Performed by: INTERNAL MEDICINE

## 2024-03-25 RX ORDER — NORTRIPTYLINE HYDROCHLORIDE 25 MG/1
25 CAPSULE ORAL NIGHTLY
Qty: 60 CAPSULE | Refills: 5 | Status: SHIPPED | OUTPATIENT
Start: 2024-03-25 | End: 2025-03-25

## 2024-03-25 NOTE — LETTER
March 25, 2024      [facility]- Neurology  [address]   [place] [address]  Phone: [phone]  Fax: [phone]       Patient: Lindsay Sneed   YOB: 1985  Date of Visit: 03/25/2024    To Whom It May Concern:    Nichole Sneed  was at [facility] on 03/25/2024. The patient may return to work on 6/30/2024 with restrictions. Patient still being evaluated for recurrent headaches, nausea, and falls related to concussion sustained in December 15th 2023. Still having post concussive symptoms and is being worked up in Neurology. If you have any questions or concerns, or if I can be of further assistance, please do not hesitate to contact me.    Sincerely,    Vignesh Burns MD

## 2024-03-25 NOTE — PROGRESS NOTES
GENERAL NEUROLOGY VISIT   2024  History:    Patient is a 39 y.o. female with no significant past medical history presenting to the clinic for establishment of care and evaluation of post concussive symptoms.     Patient states that she hit her head to a metal object at work in mid December. She did not loose consciousness and continued to work through the day but the headache kept getting worse as the day progressed and had associated photophobia, nausea and dizziness. She went to the ER and told that she had a concussion.     Since this injury, patient has been suffering with headaches. They are usually in  the crown area and go backwards. The headache is continuous, throbbing sensation of varying intensity, 8/10 at its maximal. Has nausea and photophobia which worsen when the headaches are bad. Denies any blurry vision. She does have neck pain associated with the headache which at times spread downwards.     She states that she has had episodes of frequent dizziness, where she has feeling of room spinning around her. These have lead to two significant falls where she had had bodily injuries. She denies any tinnitus, hearing loss. She is also experiencing poor memory and having difficulty with remembering daily activities. She mentions that daughter has noted some staring spells and patient herself has some episodes of feeling out of reality. Denies any seizure like activity. Patient is out of work since this has occurred. 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        No past medical history on file.    Past Surgical History:   Procedure Laterality Date    BLADDER SURGERY      due to fistula     SECTION      HYSTERECTOMY      TUBAL LIGATION         Social History     Socioeconomic History    Marital status: Single   Tobacco Use    Smoking status: Former     Types: Cigarettes    Smokeless tobacco: Never   Substance  "and Sexual Activity    Alcohol use: Never    Drug use: Never    Sexual activity: Yes     Partners: Male       Review of patient's allergies indicates:   Allergen Reactions    Adhesive     Bactrim [sulfamethoxazole-trimethoprim]        Current Outpatient Medications on File Prior to Visit   Medication Sig Dispense Refill    hydrOXYzine HCL (ATARAX) 25 MG tablet Take 1 tablet (25 mg total) by mouth 3 (three) times daily as needed for Anxiety. (Patient not taking: Reported on 12/21/2023) 30 tablet 0    meclizine (ANTIVERT) 12.5 mg tablet Take 1 tablet (12.5 mg total) by mouth 2 (two) times daily. (Patient not taking: Reported on 3/25/2024) 60 tablet 1    prochlorperazine (COMPAZINE) 10 MG tablet Take 1 tablet (10 mg total) by mouth 2 (two) times daily as needed (headaches and nausea). (Patient not taking: Reported on 3/25/2024) 60 tablet 0    spironolactone (ALDACTONE) 25 MG tablet Take 1 tablet (25 mg total) by mouth once daily. (Patient not taking: Reported on 3/25/2024) 30 tablet 1     No current facility-administered medications on file prior to visit.        Family history:  Non contributary    Review Of Systems     Constitutional Negative for fevers, chills, weigh loss   HEENT Negative for hearing loss, dysphagia, sore throat, diplopia   Respiratory Negative for shortness of breath, cough    Cardiovascular Negative for chest pain, palpitations    Gastrointestinal Negative for constipation, diarrhea, early satiety    Skin Negative for rashes    Musculoskeletal Negative for joint pains, myalgias.   Neurological See Above    Psychological Negative for sleep disturbances.    Heme/Lymph Negative for easy bruising, easy bleeding    Endocrine Negative for polyuria, polydypsia     Physical Exam:     Physical Examination  /62   Pulse 79   Ht 5' 7" (1.702 m)   Wt 96 kg (211 lb 10.3 oz)   BMI 33.15 kg/m²   Body mass index is 33.15 kg/m².      Neurological Exam  Mental Status:   Alert and oriented to name, date, " location, president.   Naming/Fluency/Comprehension/Repetition intact.   Recent/remote memory, registration, attention span/concentration, fund of knowledge intact by history.    CN:   II, III, IV, VI: PERRL, EOMI, no nystagmus, fundus not visualized due to inadequate dilation.V: intact to fine touch, temperature, pain.VII: symmetrical facial movement, nice smile, no drooping.VIII: grossly intact to hearing.IX, X: symmetrical palate, normal palatal elevation.XI: 5/5 SCM and 5/5 trapezius.XII: tongue midline, 5/5, no deviation or fasciculation.           Motor:   5/5 in all extremities  Tone: Normal tone in UE and LE, no atrophy, no fasciculation, no tremor no pronator drift.                Reflexes:   No Clonus, down going toes b/l.    Sensation: on both UEs and LEs    Intact in all modalities tested    Coordination:    Tremor: Absent.    Gait:    Normal  Rhomberg positive with swaying to the right        Impression:   #post concussion syndrome  Head injury without LOC, however experiencing myriad of neurological symptoms including headache, vertigo leading to further falls and gait imbalance. She is having cognitive fogging as well along with ?staring episodes, no seizure like activity.     Plan:   - MRI brain without contrast  - EEG awake and drowsy  - Starting on Nortriptyline 25 mg nightly  - referral placed for PT/OT      RTC 3 months or sooner if needed      Vignesh Burns MD  Neurology

## 2024-04-03 ENCOUNTER — HOSPITAL ENCOUNTER (OUTPATIENT)
Dept: NEUROLOGY | Facility: HOSPITAL | Age: 39
Discharge: HOME | End: 2024-04-03
Payer: MEDICAID

## 2024-04-03 DIAGNOSIS — F07.81 POST CONCUSSION SYNDROME: ICD-10-CM

## 2024-04-03 PROCEDURE — 95819 EEG AWAKE AND ASLEEP: CPT

## 2024-04-03 PROCEDURE — 95819 EEG AWAKE AND ASLEEP: CPT | Mod: 26,,, | Performed by: PSYCHIATRY & NEUROLOGY

## 2024-04-08 ENCOUNTER — HOSPITAL ENCOUNTER (OUTPATIENT)
Dept: RADIOLOGY | Facility: HOSPITAL | Age: 39
Discharge: HOME | End: 2024-04-08
Payer: MEDICAID

## 2024-04-08 ENCOUNTER — HOSPITAL ENCOUNTER (OUTPATIENT)
Dept: RADIOLOGY | Facility: HOSPITAL | Age: 39
Discharge: HOME | End: 2024-04-08
Attending: INTERNAL MEDICINE
Payer: MEDICAID

## 2024-04-08 ENCOUNTER — TELEPHONE (OUTPATIENT)
Dept: NEUROLOGY | Facility: CLINIC | Age: 39
End: 2024-04-08
Payer: MEDICAID

## 2024-04-08 DIAGNOSIS — S59.902A INJURY OF LEFT ELBOW, INITIAL ENCOUNTER: ICD-10-CM

## 2024-04-08 DIAGNOSIS — G44.59 OTHER COMPLICATED HEADACHE SYNDROME: ICD-10-CM

## 2024-04-08 PROCEDURE — 73080 X-RAY EXAM OF ELBOW: CPT | Mod: 26,LT,, | Performed by: RADIOLOGY

## 2024-04-08 PROCEDURE — 73080 X-RAY EXAM OF ELBOW: CPT | Mod: TC,FY,LT

## 2024-04-14 NOTE — PROCEDURES
Routine EEG Report    Lindsay Sneed  [id number]  1985    DATE OF SERVICE:  04/03/2024  REASON FOR CONSULT:  39-year-old woman with a head strike followed by persistent headache.  Evaluate for evidence of epileptiform activity.    METHODOLOGY   Electroencephalographic (EEG) recording is with electrodes placed according to the International 10-20 placement system.  Thirty two (32) channels of digital signal (sampling rate of 512/sec) including T1 and T2 was simultaneously recorded from the scalp and may include  EKG, EMG, and/or eye monitors.  Recording band pass was 0.1 to 512 hz.  Digital video recording of the patient is simultaneously recorded with the EEG.  The patient is instructed report clinical symptoms which may occur during the recording session.  EEG and video recording is stored and archived in digital format. Activation procedures which include photic stimulation, hyperventilation and instructing patients to perform simple task are done in selected patients.    The EEG is displayed on a monitor screen and can be reviewed using different montages.  Computer assisted analysis is employed to detect spike and electrographic seizure activity.   The entire record is submitted for computer analysis.  The entire recording is visually reviewed and the times identified by computer analysis as being spikes or seizures are reviewed again.  Compresses spectral analysis (CSA) is also performed on the activity recorded from each individual channel.  This is displayed as a power display of frequencies from 0 to 30 Hz over time.   The CSA is reviewed looking for asymmetries in power between homologous areas of the scalp and then compared with the original EEG recording.     [employer] software is also utilized in the review of this study.  This software suite analyzes the EEG recording in multiple domains.  Coherence and rhythmicity is computed to identify EEG sections which may contain organized seizures.  Each channel  undergoes analysis to detect presence of spike and sharp waves which have special and morphological characteristic of epileptic activity.  The routine EEG recording is converted from spacial into frequency domain.  This is then displayed comparing homologous areas to identify areas of significant asymmetry.  Algorithm to identify non-cortically generated artifact is used to separate eye movement, EMG and other artifact from the EEG.      EEG FINDINGS  Background activity:   The waking background is continuous and symmetric with a well-formed 10 hz posterior dominant rhythm seen bilaterally.    Sleep:  The patient transitions from wakefulness to sleep with the appearance of sleep spindles, K complexes, and vertex waves.    Activation procedures:   The patient is able to follow simple commands and answers most orientation questions correctly (she states that it is 4/4 instead of 4/3). Photic stimulation is performed with a symmetric photic driving response noted at 1-20 flashes per second (fps).  Hyperventilation is performed with good effort with no activation of the record.     Cardiac Monitor:   Heart rate appears generally regular on a single lead EKG.    Impression:   This is a normal awake and asleep routine EEG.  There are no prominent focal findings, no epileptiform discharges, and no electrographic seizures.   Of note, a normal EEG does not rule out the diagnosis of epilepsy.  Clinical correlation is advised.    Monica Downs MD PhD [facility]  Neurology-Epilepsy  [facility].

## 2024-04-15 PROBLEM — F07.81 POST CONCUSSION SYNDROME: Status: ACTIVE | Noted: 2024-04-15

## 2024-04-15 NOTE — ADDENDUM NOTE
Encounter addended by: Shannon Agudelo on: 4/15/2024 10:03 AM   Actions taken: Problem List modified, Charge Capture section accepted

## 2024-05-09 ENCOUNTER — CLINICAL SUPPORT (OUTPATIENT)
Dept: REHABILITATION | Facility: HOSPITAL | Age: 39
End: 2024-05-09
Payer: MEDICAID

## 2024-05-09 DIAGNOSIS — R42 DIZZINESS: ICD-10-CM

## 2024-05-09 DIAGNOSIS — G44.59 OTHER COMPLICATED HEADACHE SYNDROME: ICD-10-CM

## 2024-05-09 DIAGNOSIS — M54.2 MUSCULOSKELETAL NECK PAIN: Primary | ICD-10-CM

## 2024-05-09 DIAGNOSIS — R42 DYSEQUILIBRIUM: ICD-10-CM

## 2024-05-09 PROCEDURE — 97161 PT EVAL LOW COMPLEX 20 MIN: CPT | Mod: PO

## 2024-05-10 ENCOUNTER — OUTPATIENT CASE MANAGEMENT (OUTPATIENT)
Dept: ADMINISTRATIVE | Facility: OTHER | Age: 39
End: 2024-05-10
Payer: MEDICAID

## 2024-05-10 PROBLEM — G44.59 OTHER COMPLICATED HEADACHE SYNDROME: Status: ACTIVE | Noted: 2024-05-10

## 2024-05-10 PROBLEM — R42 DIZZINESS: Status: ACTIVE | Noted: 2024-05-10

## 2024-05-10 PROBLEM — R42 DYSEQUILIBRIUM: Status: ACTIVE | Noted: 2024-05-10

## 2024-05-10 PROBLEM — M54.2 MUSCULOSKELETAL NECK PAIN: Status: ACTIVE | Noted: 2024-05-10

## 2024-05-10 NOTE — PLAN OF CARE
[facility]  Physical Therapy Neurological Rehabilitation Initial Evaluation     Name: Lindsay Sneed  Clinic Number: [id number]    Therapy Diagnosis:   Encounter Diagnoses   Name Primary?    Other complicated headache syndrome     Musculoskeletal neck pain Yes    Dizziness     Dysequilibrium      Physician: Vignesh Burns MD    Physician Orders: PT Eval and Treat   Medical Diagnosis from Referral: G44.59 (ICD-10-CM) - Other complicated headache syndrome   Evaluation Date: 5/9/2024  Authorization Period Expiration: 3/25/2024  Plan of Care Expiration: 6/21/2024  Progress Note Due: 6/10/2024  Visit # / Visits authorized: 1/ 1  FOTO: 0/ 3 (FOTO not completed this date)     Precautions: Standard    Time In: 1020  Time Out: 1100  Total Billable Time: 40 minutes    Subjective      Date of onset: December 15, 2023    History of current condition - Lindsay reports she has been sick for the last 2 days with headahces. Has had to stay in bed due to severe pain. Reports associated nausea and loss of appetite. Patient also reports dizzy spells and feels she is not steady on her feet. She arrived today wearing sunglasses and reports light sensitivity. Since initial injury, she has seen PCP and neurologist. Reports she has most recently been prescribed Nortriptyline but is hesitant to begin due to concern that it might not work.     Patient reports she has been unable to return to work due to symptoms. She has lost her home and is currently living in a hotel.     Of note, she reports concern for mild seizures, described as staring episodes. She reports that her MRI need to be rescheduled.     Patient also reports a second fall, injuring left knee. Reports increased fluid contributing to decreased stability.       Per office visit with Vignesh Burns MD (Neurology) on 3/25/2024:   Patient states that she hit her head to a metal object at work in mid December. She did not loose consciousness and  continued to work through the day but the headache kept getting worse as the day progressed and had associated photophobia, nausea and dizziness. She went to the ER and told that she had a concussion.      Since this injury, patient has been suffering with headaches. They are usually in  the crown area and go backwards. The headache is continuous, throbbing sensation of varying intensity, 8/10 at its maximal. Has nausea and photophobia which worsen when the headaches are bad. Denies any blurry vision. She does have neck pain associated with the headache which at times spread downwards.      Imaging:   CT Head Without Contrast  (2023)  Impression:     Normal    CT CERVICAL SPINE WITHOUT CONTRAST  (2023)  Impression:     No acute traumatic pathology identified    Prior Therapy: None for this diagnosis  Social History: Currently homeless; living in hotel  Falls: second fall following concussion    DME: no DME  Home Environment: first floor hotel room  Exercise Routine / History: none reported   Occupation: Patient works for [employer] as a . Currently on leave with workman's compensation.   Prior Level of Function: independent  Current Level of Function: modified independent to independent with self-restricted activity    Pain:  Current 6/10, worst 10/10, best 4/10   Location: teri horn distribution, starting anteriorly  Aggravating Factors: light   Easing Factors: lying down, rest, and dim lighting/sunglasses/darkness    Patient reports nausea with headaches and lack of appetite.     Patient's goals: to reduce symptoms and return to prior level of function    Medical History:   No past medical history on file.    Surgical History:   Lindsay Sneed  has a past surgical history that includes Hysterectomy; Tubal ligation; Bladder surgery; and  section.    Medications:   Lindsay has a current medication list which includes the following prescription(s): hydroxyzine hcl, meclizine,  "nortriptyline, and spironolactone.    Allergies:   Review of patient's allergies indicates:   Allergen Reactions    Adhesive     Bactrim [sulfamethoxazole-trimethoprim]         Objective      - Follows commands: 100% of time   - Speech: no overt impairments noted with conversational speech during evaluation   *Patient expressed concerns for memory changes.     Mental status: alert, oriented to person, place, and time, anxious  Appearance: Casually dressed and Well groomed  Behavior:  cooperative and adequate rapport can be established  Attention Span and Concentration:  Normal    Dominant hand:  left     Posture Alignment in sitting:   Mild forward head and rounded shoulders    Visual/Oculomotor Screen:   Deferred this date due to pain with head movement     Modified VAS (Vertebral Artery Screen), in sitting (rotation, then extension):  R: negative  L: negative        Upper Cervical Instability Testing:   [place]: deferred due to pain with palpation of C2 vertebrae  Alar Ligament:deferred due to pain with palpation of C2 vertebrae      CERVICAL SPINE  Flexion: WFL (80-90 deg)  Extension: 30 degrees (70-80 deg)  L side bend: 40 degrees (20-45 degrees)  R side bend: WFL (20-45 degrees)  L rotation: WFL (70-90 degrees)  R rotation: WFL (70-90 degrees)  Are concurrent symptoms present with any of these movements: reports sensitivity to movement with tightness and "pulling" with all motions    Deep neck flexion: Patient unable to maintain cervical nod/chin tuck with neck in unsupported flexion      Palpation: Patient tender to palpation of C2-6 spinous processes, bilateral cervical paraspinals and bilateral upper/mid trap       Upper Extremity Strength/Periscapular strength  Not assessed this date due to time constraints; to be assessed in future visits as appropriate       SUNSHINE SENSORY TESTING:  Not performed this date due to time constraints.    Intake Outcome Measure for FOTO  Survey    Therapist reviewed FOTO " "scores for Lindsay Sneed on 5/9/2024.   FOTO report - see Media section or FOTO account episode details.    Intake Score: Not performed this date        Treatment     Total Treatment time separate from Evaluation: 0 minutes    No treatment provided this date secondary to time needed to gather subjective history, complete objective testing, assess functional mobility and perform standardized outcome measures.       Patient Education and Home Exercises     Education provided:   - Patient was provided with education re: goals and plan of care for physical therapy. Patient verbalized understanding and agreement with plan.   - Discussed recommendation for transfer to orthopedic physical therapist to first address elevated neck pain and sensitivity prior to beginning vestibular or balance training   - Recommended patient begin applying ice to cervical region to calm inflammatory process and address pain   - Discussed plan to seek referrals to occupational therapy, physical therapy, and outpatient case management     Written Home Exercises Provided: To be provided in first follow-up treatment session    Assessment     Lindsay is a 39 y.o. female referred to outpatient Physical Therapy with a medical diagnosis of G44.59 (ICD-10-CM) - Other complicated headache syndrome related to a concussion sustained in December 2023. Patient presents with signs and symptoms consistent with diagnosis and outlined in "thearpy diagnoses" section of initial evaluation with her most burdensome symptom being persistent headache. She presents with limited and painful cervical AROM, sensitivity with palpation of cervical musculature, and weakness of deep cervical musculature. Though she reports dizziness and imbalance, I deferred testing of oculomotor function and vestibular system due to neck pain and headache. I feel she will benefit from participation in orthopedic  physical therapist to address neck pain and weakness prior to beginning " vestibular rehabilitation therapy. Patient voiced agreement with this plan.     She is a good candidate for outpatient physical therapy to address impairments identified in today's evaluation, reduce pain and facilitate her return to work and other life roles.     Upon completion of evaluation, patient was provided with education regarding goals and plan of care with which she verbalized understanding and agreement.       Patient prognosis is Good.   Patient will benefit from skilled outpatient Physical Therapy to address the deficits stated above and in the chart below, provide patient /family education, and to maximize patient's level of independence.     Plan of care discussed with patient: Yes  Patient's spiritual, cultural and educational needs considered and patient is agreeable to the plan of care and goals as stated below:     Anticipated Barriers for therapy: chronic nature of symptoms     Medical Necessity is demonstrated by the following  History  Co-morbidities and personal factors that may impact the plan of care [x] LOW: no personal factors / co-morbidities  [] MODERATE: 1-2 personal factors / co-morbidities  [] HIGH: 3+ personal factors / co-morbidities    Moderate / High Support Documentation:   Co-morbidities affecting plan of care:   No medical history on file    Personal Factors:   no deficits     Examination  Body Structures and Functions, activity limitations and participation restrictions that may impact the plan of care [] LOW: addressing 1-2 elements  [] MODERATE: 3+ elements  [x] HIGH: 4+ elements (please support below)    Moderate / High Support Documentation:   Cervical AROM  Vertebral artery screen  Palpation of cervical spine  Cervical spine strength/motor control     Clinical Presentation [] LOW: stable  [x] MODERATE: Evolving  [] HIGH: Unstable     Decision Making/ Complexity Score: low       Goals:    Short Term Goals: 3 weeks Date last assessed: Status:   1. The patient will be  educated regarding an individualized home exercise program.  5/9/2024 New   2. The patient will participate in completion of the Headache Disability Index to objectively measure headache symptoms affecting functional activities.  5/9/2024 New   5. The patient will improve her AROM with cervical extension to 50 degrees to improve her  performance of activities of daily living involving vertical head turns.  5/9/2024 New       LongTerm Goals: 6 weeks Date last assessed: Status:   1. The patient will be compliant with a home exercise program. 5/9/2024 New   2. The patient will decrease complaint of worst headache pain from 10/10 to 8/10 to decrease interference from pain with functional activities. (MDIC for chronic musculoskeletal pain = 1 point)   5/9/2024 New   5. The patient will perform active cervical range of motion in all joints and plane to facilitate activities of daily living and functional mobility related to return to work.   5/9/2024 New       Plan     Plan of care Certification: 5/9/2024 to 6/21/2024.    Outpatient Physical Therapy 2 times weekly for 6 weeks to include the following interventions: Cervical/Lumbar Traction, Electrical Stimulation (as indicated and cleared for contraindications), Gait Training, Manual Therapy, Moist Heat/ Ice, Neuromuscular Re-ed, Patient Education, Therapeutic Activities, Therapeutic Exercise, and Ultrasound.     Recommending transfer of care to Orthopedic physical therapist to address neck pain and dysfunction.     Will request referrals to outpatient case management , occupational therapy and speech therapy.         LENY MOSLEY PT        Physician's Signature: _________________________________________ Date: ________________

## 2024-05-22 ENCOUNTER — TELEPHONE (OUTPATIENT)
Dept: PAIN MEDICINE | Facility: CLINIC | Age: 39
End: 2024-05-22
Payer: MEDICAID

## 2024-05-22 NOTE — TELEPHONE ENCOUNTER
Spoke with pt advised we are not in network with [employer]. And pt states she wants to  use WC I advise  s [facility] location does not take work comp and we also do not treat concussions. Pt verbalizes understanding.

## 2024-05-22 NOTE — TELEPHONE ENCOUNTER
----- Message from Helen Powers sent at 5/22/2024  9:35 AM CDT -----  Regarding: Sooner Appointment Request  Contact: patient at [phone]  Type:  Sooner Appointment Request    Name of Caller:  patient at [phone]    Symptoms:  concussion pain, referral from physical therapist  Additional Information:  Please call and advise. Thank you

## 2024-05-24 ENCOUNTER — TELEPHONE (OUTPATIENT)
Dept: NEUROLOGY | Facility: CLINIC | Age: 39
End: 2024-05-24
Payer: MEDICAID

## 2024-05-24 DIAGNOSIS — F07.81 POST CONCUSSION SYNDROME: Primary | ICD-10-CM

## 2024-05-24 NOTE — TELEPHONE ENCOUNTER
----- Message from Mirtha Fowler PT sent at 5/10/2024  3:41 PM CDT -----  Regarding: Requests for OT and ST referrals  Good afternoon Dr. Burns,     I saw Lindsay for physical therapy evaluation yesterday. I feel she may benefit from referral to both occupational therapy and speech therapy for a multidisciplinary approach to her post-concussion symptoms.     If in agreement with this referral, could you place orders for both occupational therapy and speech therapy?    Thank you for your referral,   Mirtha

## 2024-05-27 ENCOUNTER — CLINICAL SUPPORT (OUTPATIENT)
Dept: REHABILITATION | Facility: HOSPITAL | Age: 39
End: 2024-05-27
Payer: MEDICAID

## 2024-05-27 DIAGNOSIS — H54.7 IMPAIRED VISION: ICD-10-CM

## 2024-05-27 DIAGNOSIS — R42 NONSPECIFIC DIZZINESS: ICD-10-CM

## 2024-05-27 DIAGNOSIS — R52 PAIN: Primary | ICD-10-CM

## 2024-05-27 DIAGNOSIS — R11.0 DAILY NAUSEA: ICD-10-CM

## 2024-05-27 PROCEDURE — 97166 OT EVAL MOD COMPLEX 45 MIN: CPT | Mod: PO

## 2024-05-27 NOTE — PLAN OF CARE
[facility]   Occupational Therapy Initial Neurological Evaluation     Name: Lindsay Sneed  Clinic Number: [id number]    Therapy Diagnosis:   Encounter Diagnoses   Name Primary?    Pain Yes    Impaired vision     Daily nausea     Nonspecific dizziness      Physician: Vignesh Burns MD    Physician Orders: Eval and Treat  Medical Diagnosis: G44.59 (ICD-10-CM) - Other complicated headache syndrome   Evaluation Date: 5/27/2024  Insurance Authorization Period Expiration: 12/31/2024  Plan of Care Certification Period: 8/23/2024  Date of Return to MD: TBD  Visit # / Visits authorized: 1 / 1  FOTO: 0/ 3, to be assessed     Precautions:  Standard, Fall, and seizure    Time In: 10:20 AM  Time Out: 11:05 AM  Total Billable Time: 45 minutes    Subjective     Date of Onset: December 15, 2023     History of Current Condition: Pt reports a concussion on December 15, 2023 while at work.She was hit with a metal pole on the top of the head (accident). She's recently had several falls and  found out she has been having seizures. She reports that it's like a black out when she has these seizures. She reports she is in a great deal of pain and has been unable to work since December due to symptoms. Symptoms are debilitating where pt spends most days in bed to decrease.     Symptoms: nausea, headache, pain, dizziness    Involved Side: Both sides of head, neck, and shoulders  Dominant Side: Left     Surgical Procedure: none  Imaging: CT head 12/19/2024   FINDINGS:  The CSF spaces, brain parenchyma, and bony calvarium are intact.There is no evidence of hemorrhage, mass, or acute infarct.     The visualized paranasal sinuses are clear.     Impression:     Normal    CT Cervical spine 12/19/2024  FINDINGS:  CT cervical spine demonstrates straightening of the usual cervical lordosis.  Body heights and disc space heights are maintained.  No fracture identified.  Incidental vertebral hemangioma is seen at C6  vertebral body.  No prevertebral soft tissue swelling or paravertebral hematoma is seen.     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     Current Functional Status   Home/Living environment: lost her house due to not working (due to injury), currently lives in a hotel     - DME: none        Limitation of Functional Status as follows:   ADLs/IADLs:     -Transfers: (I) but completes slower than before due to symptoms     - Bed Mobility: (I) but completes slower than before due to symptoms   - Feeding: (I)but completes slower than before due to symptoms     - Bathing: (I) but completes slower than before due to symptoms     - Dressing: (I)but completes slower than before due to symptoms   - Grooming: (I)but completes slower than before due to symptoms   - Hygiene: (I)but completes slower than before due to symptoms    -Toileting: (I)     - Home Management: not completing right now (due to living in hotel)     - Driving: no       - Leisure: hair, make up, dressing up, content creating     Patient's Goals for Therapy: to get better, some normalcy.     Past Medical History/Physical Systems Review:     Past Medical History:  Lindsay Sneed  has no past medical history on file.    Past Surgical History:  Lindsay Sneed  has a past surgical history that includes Hysterectomy; Tubal ligation; Bladder surgery; and  section.    Current Medications:  Lindsay has a current medication list which includes the following  prescription(s): hydroxyzine hcl, meclizine, nortriptyline, and spironolactone.    Allergies:  Review of patient's allergies indicates:   Allergen Reactions    Adhesive     Bactrim [sulfamethoxazole-trimethoprim]       Objective     Cognitive Exam:  Oriented: Person, Place, Time and Situation   Behaviors: normal, cooperative  Follows Commands/attention: Follows multistep  commands  Communication: clear/fluent  Memory: No Deficits noted as determined by 3 word recall after 1 minute and 3 minutes  Safety awareness/insight to disability: aware of diagnosis, treatment, and prognosis  Coping skills/emotional control: Appropriate to situation    Visual/Perceptual: Has not been to eye doctor, has sensitivity to light.   Tracking: impaired    Horizontal: intact   Vertical: impaired   Diagonal: impaired   Saccades: impaired (pt got really dizzy and nauseous)   Acuity: contact   Convergence: left eye did not converge as much as right eye.   Nystagmus: none   R/L discrimination: intact  Visual field: intact    Physical Exam:  Postural examination/scapula alignment: Slouched posture  Joint integrity: no concerns   Skin integrity: no concerns   Edema: no concerns      Joint Evaluation  AROM  5/27/2024 AROM  5/27/2024    Right  Left    Shoulder flex 0-180 WNL WNL   Shoulder Abd 0-180 WNL WNL   Shoulder ER 0-90 WNL WNL   Shoulder IR 0-90 WNL WNL   Shoulder Extension 0-80 WNL WNL   Elbow flex/ext 0-150 WNL WNL   Wrist flex 0-80 WNL WNL   Wrist ext 0-70 WNL WNL     Fist: normal    Strength 5/27/2024 5/27/2024   **within available ROM** Right  Left    Shoulder flex 4/5 4/5   Shoulder abd 4/5 4/5   Shoulder ER Did not tolerate due to pain  Did not tolerate due to pain    Shoulder IR Did not tolerate due to pain  Did not tolerate due to pain    Shoulder Extension 4-/5 4-/5   Shoulder Horizontal adduction 4/5 4/5   Elbow flex 4/5 4/5   Elbow ext 4/5 4/5   Wrist flex 5/5 5/5   Wrist ext 5/5 5/5     Hand Strength (LUBNA Dynamometer,  Setting II)   (lbs) Left  5/27/2024  Right  5/27/2024    1 46 56   2 55 62   3 43 60   avg 48 59.3   [norms for women aged 40-44: R=70.4 +/-13.5; L=62.3 +/-13.8 (Mathiowetz et al, 1985)]    Gross motor coordination:   JULIO C (Rapid Alternating Movements): intact   Finger to Nose (5 times): intact   Finger Flicks (coordination moving from digit flexion to digit extension): intact     Box and Block Assessment Left Right   5/27/2024 59 58    [norms for women aged 40-44: R=81.1 +/-8.2; L=79.7 +/-8.8 (Mathjasminewetz et al, 1985)]    Fine motor coordination:   -   Thumb to Finger Opposition: intact     9 Hole Peg Test (9HPT) Left Right   5/27/2024 19s 20s   [norms for women aged 41-45: R=16.54s +/-2.14s; L=17.64s +/-2.06s (Bruna et al, 2003)]     Tone:  Modified Melissa Scale:   0 - No increase in muscle tone    Sensation:  Lindsay  reports intermittent numbness and tingling in neck and shoulders. She reports numbness in arms in the morning if she went to sleep with neck and shoulders tingling.     Balance:   Static Sit - NORMAL: No deviations seen in posture held statically  Dynamic sit- NORMAL: No deviations seen in posture held statically  Static Stand - see PT eval   Dynamic stand - see PT eval     Endurance Deficit: mild                   Treatment     Total Treatment time separate from Evaluation time:0 minutes   Home Exercises and Patient Education Provided     Education provided:   -role of OT, goals for OT, scheduling/cancellations, insurance limitations with patient.    Written Home Exercises Provided: not given today    Assessment     Lindsay Sneed is a 39 y.o. female referred to outpatient occupational therapy and presents with a medical diagnosis of other complicated headache syndrome. .    Following medical record review it is determined that pt will benefit from occupational therapy services in order to maximize functional participation in daily task she following goals were discussed with the patient and  patient is in agreement with them as to be addressed in the treatment plan. The patient's rehab potential is Good.     Anticipated barriers to occupational therapy: symptoms from injury      Plan of care discussed with patient: Yes  Patient's spiritual, cultural and educational needs considered and patient is agreeable to the plan of care and goals as stated below:     Medical Necessity is demonstrated by the following  Occupational Profile/History  Co-morbidities and personal factors that may impact the plan of care [] LOW: Brief chart review  [x] MODERATE: Expanded chart review   [] HIGH: Extensive chart review    Moderate / High Support Documentation: co morbidities      Examination  Performance deficits relating to physical, cognitive or psychosocial skills that result in activity limitations and/or participation restrictions  [] LOW: addressing 1-3 Performance deficits  [x] MODERATE: 3-5 Performance deficits  [] HIGH: 5+ Performance deficits (please support below)    Moderate / High Support Documentation:    Physical:  Muscle Power/Strength  Visual Functions  Proprioception Functions  Pain    Cognitive:  No Deficits    Psychosocial:    Social Interaction  Habits  Routines  Rituals  Group Participation     Treatment Options [] LOW: Limited options  [x] MODERATE: Several options  [] HIGH: Multiple options      Decision Making/ Complexity Score: moderate       The following goals were discussed with the patient and patient is in agreement with them as to be addressed in the treatment plan.     Goals:    Short Term Goals (6 weeks) Status When Last Assessed  Progressing/ Met   1) Pt to be (I) with initial HEP   progressing 5/27/2024    2) Pt to be able to implement progressive relaxation / mindfulness techniques into daily routine (I).   progressing 5/27/2024    3) Vision to be further assessed and goal added as necessary   progressing 5/27/2024        LongTerm Goals (12 weeks) Status When Last Assessed   Progressing/ Met   1) Pt to be able to take eye breaks as appropriate when using technology   progressing 5/27/2024    2) Pt to be able to participate in social media influencing task for 15 minutes  with compensatory strategy as needed with no significant increase in symptoms   progressing 5/27/2024    3) Pt headache to reduce to 3/10 consistently over 5/7 days.  6-8/10  progressing 5/27/2024    4) Pt to report implementation of compensatory strategies to enable getting through daily routine without having to get into bed to reduce symptoms   progressing 5/27/2024      Additional functional goals to be added as pt progresses and per her priorities.    Plan   Certification Period/Plan of care expiration: 5/27/2024 to 8/23/2024.    Outpatient Occupational Therapy 2 times weekly for 12 weeks to include the following interventions: Manual Therapy, Neuromuscular Re-ed, Patient Education, Self Care, Therapeutic Activities, and Therapeutic Exercise.    Allison Hui MS, OTR/L     Physician's Signature: _________________________________________ Date: ________________

## 2024-05-27 NOTE — PROGRESS NOTES
[facility]   Occupational Therapy Initial Neurological Evaluation       Lindsay Sneed   MRN: [id number]     Occupational therapy evaluation complete. Please see attached plan of care for details. Thank you for consult!    Allison Hui MS, OTR/L   5/27/2024

## 2024-05-28 ENCOUNTER — CLINICAL SUPPORT (OUTPATIENT)
Dept: REHABILITATION | Facility: HOSPITAL | Age: 39
End: 2024-05-28
Payer: MEDICAID

## 2024-05-28 DIAGNOSIS — R42 DYSEQUILIBRIUM: ICD-10-CM

## 2024-05-28 DIAGNOSIS — R42 DIZZINESS: ICD-10-CM

## 2024-05-28 DIAGNOSIS — M54.2 MUSCULOSKELETAL NECK PAIN: Primary | ICD-10-CM

## 2024-05-28 PROCEDURE — 97110 THERAPEUTIC EXERCISES: CPT | Mod: PN

## 2024-05-28 NOTE — PROGRESS NOTES
[facility]   Physical Therapy Treatment Note     Name: Lindsay Sneed  Clinic Number: [id number]    Therapy Diagnosis:   Encounter Diagnoses   Name Primary?    Musculoskeletal neck pain Yes    Dizziness     Dysequilibrium      Physician: Vignesh Burns MD    Visit Date: 5/28/2024    Physician Orders: PT Eval and Treat   Medical Diagnosis from Referral: G44.59 (ICD-10-CM) - Other complicated headache syndrome   Evaluation Date: 5/9/2024  Authorization Period Expiration: 3/25/2024  Plan of Care Expiration: 6/21/2024  Progress Note Due: 6/10/2024  Visit # / Visits authorized: 1/ 1  FOTO: 0/ 3 (FOTO not completed this date)      Precautions: Standard    PTA Visit #: 0/5     FOTO first follow up:   FOTO second follow up:     Time In: 1600  Time Out: 1650  Total Billable Time: 50 minutes    SUBJECTIVE     Pt reports: she was hit in the head at work in December 2023, which lead her to having headaches and constant neck/upper back pain. Her symptoms are bilateral and extend down to her mid back. She experiences a lot of dizziness and nausea as well.    She was compliant with home exercise program.  Response to previous treatment: first follow up   Functional change: first follow up     Pain: 7/10  Location: bilateral neck      OBJECTIVE       Posture: increased thoracic and CT junction kyphosis, downwardly rotated scapula bilaterally     Cervical Range of Motion:    Degrees Pain Normal   Flexion 40 Yes CT junction down to T-spine   80-90 deg   Extension 30 Yes mid cervical at hinge point C4-5   normal ROM: 70-80 deg   Right Rotation 55 Yes Left upper trap    70-90 degrees   Left Rotation 65 Yes Right upper trap    70-90 degrees   Right Side Bending 30 Yes Left upper trap  20-45 degrees   Left Side Bending 30 Yes Left upper trap  20-45 degrees      Shoulder Range of Motion:   Shoulder Left Right   Flexion 160 160   External rotation at 0  70 70   Functional Internal rotation  T7 T7       Special  Tests:  Vertebral Artery Unable to max rotate cervical spine secondary to pt reports of pain and guarding   Omar negative   Lateral Flexion Alar Ligament intact   Transverse Ligament intact     Cervical joint mobility:    C0-C1 Flex/Ext Limited O-A flexion 15 degrees   C0-C1 Sidebending good 5 degrees    C1-2 Rotation Unable to formally test due to patient guarding 45 degrees   C1-3 Rotation Unable to formally test due to patient guarding 60 degrees    Side glides  good          Reflexes:  -Bicep (C5): 2+  -Brachioradialis (C6): 2+    Thoracic mobility: limited thoracic segmental extension    Palpation: TTP to cervical and thoracic paraspinals      Treatment     Lindsay received the treatments listed below:      therapeutic exercises to develop strength, endurance, and ROM for 50 minutes including:    Assessment as above   Seated thoracic extension x10 with 5s hold   Wall slides x10   Nustep x8mins at low intensity   .    Education provided on sleep hygiene, nutrition, hydration, and stress management in order to decrease heightened pain response.     Patient Education and Home Exercises     Home Exercises Provided and Patient Education Provided     Education provided:   - Home Exercise Program to be performed twice daily   - education on chronic pain and central sensitization  - education on nutrition, hydration, sleep, and stress management    Written Home Exercises Provided: yes. Exercises were reviewed and Lindsay was able to demonstrate them prior to the end of the session.  Lindsay demonstrated good  understanding of the education provided. See EMR under Patient Instructions for exercises provided during therapy sessions    ASSESSMENT     Lindsay presents to PT for first follow up and demonstrates high irritability of symptoms which limited my ability to formally assess her cervical spine. Focus of first treatment was assessment and attempting to decrease sensitivity of her nervous system with thoracic  mobility, light cardio, and periscapular muscle activation. Pt requires rest breaks when transitioning from supine to sit secondary to dizziness. Will progress as tolerated.     Lindsay Is progressing well towards her goals.   Pt prognosis is Fair.     Pt will continue to benefit from skilled outpatient physical therapy to address the deficits listed in the problem list box on initial evaluation, provide pt/family education and to maximize pt's level of independence in the home and community environment.     Pt's spiritual, cultural and educational needs considered and pt agreeable to plan of care and goals.     Anticipated barriers to physical therapy: chronicity of symptoms, heightened pain response    Goals:     Short Term Goals: 3 weeks Date last assessed: Status:   1. The patient will be educated regarding an individualized home exercise program.  5/9/2024 New   2. The patient will participate in completion of the Headache Disability Index to objectively measure headache symptoms affecting functional activities.  5/9/2024 New   5. The patient will improve her AROM with cervical extension to 50 degrees to improve her  performance of activities of daily living involving vertical head turns.  5/9/2024 New         LongTerm Goals: 6 weeks Date last assessed: Status:   1. The patient will be compliant with a home exercise program. 5/9/2024 New   2. The patient will decrease complaint of worst headache pain from 10/10 to 8/10 to decrease interference from pain with functional activities. (MDIC for chronic musculoskeletal pain = 1 point)    5/9/2024 New   5. The patient will perform active cervical range of motion in all joints and plane to facilitate activities of daily living and functional mobility related to return to work.   5/9/2024 New         Plan      Plan of care Certification: 5/9/2024 to 6/21/2024.    Continue plan of care decreasing irritability of symptoms in order to address cervical dysfunction and  progress tolerance to activity    Kevin Gonzales, PT, DPT   Board Certified Clinical Specialist in Orthopedic Physical Therapy  Board Certified Clinical Specialist in Sports Physical Therapy

## 2024-05-29 ENCOUNTER — CLINICAL SUPPORT (OUTPATIENT)
Dept: REHABILITATION | Facility: HOSPITAL | Age: 39
End: 2024-05-29
Payer: MEDICAID

## 2024-05-29 DIAGNOSIS — H54.7 IMPAIRED VISION: ICD-10-CM

## 2024-05-29 DIAGNOSIS — R42 NONSPECIFIC DIZZINESS: ICD-10-CM

## 2024-05-29 DIAGNOSIS — R52 PAIN: Primary | ICD-10-CM

## 2024-05-29 DIAGNOSIS — R11.0 DAILY NAUSEA: ICD-10-CM

## 2024-05-29 PROCEDURE — 97530 THERAPEUTIC ACTIVITIES: CPT | Mod: PO

## 2024-05-29 NOTE — PROGRESS NOTES
[facility]  Occupational Therapy Treatment Note     Date: 5/29/2024  Name: Lindsay Sneed  Clinic Number: [id number]    Therapy Diagnosis:   Encounter Diagnoses   Name Primary?    Pain Yes    Impaired vision     Daily nausea     Nonspecific dizziness      Physician: Vignesh Burns MD    Physician Orders: Eval and Treat  Medical Diagnosis: G44.59 (ICD-10-CM) - Other complicated headache syndrome   Evaluation Date: 5/27/2024  Insurance Authorization Period Expiration: 12/31/2024  Plan of Care Certification Period: 8/23/2024  Date of Return to MD: TBD  Visit # / Visits authorized: 1 / ? + eval   FOTO: 1/ 3, last assessed on 5/29/2024     Precautions:  Standard, Fall, and seizure     Time In: 1:07 PM (arrived a few minutes late)   Time Out: 1:45 PM  Total Billable Time: 38 minutes    Subjective     Patient reports: Pt reports she is in excruciating pain from physical therapy. She is feeling stressed.     She was not compliant with home exercise program given last session. (Not given)   Response to previous treatment:1st treatment   Functional change: no changes per pt report    Pain: 8/10  Location: middle to upper back, shoulders, neck and back of head (headache)     Objective     Objective Measures updated at progress report unless specified.    At 1:09, FU=299/80, HR=77    FOTO: 51/100     BIVABA Basic Visual Function Assessment (pt wears contact  Bilateral pupils are (symmetrical)   Bilateral pupils constrict slowly + minimally with light stimulation to left and right eye.  She does not have  appropriate pupillary response to accomodation (dizziness reported)   She is   Left    eye dominant.  Acuity measured using the intermediate Distance Chart at a distance of 1 meter  Right eye: 20/60  Left eye: 20/125  Both eyes: 20/60    Reading Acuity using the Kalia test card at a distance of 40cm/ 16in (with glasses)  20/50     Contrast Sensitivity Function using the Carli numbers Screener at a  distance of 40cm/ 16in (with glasses):  Pt recognized numbers to the 2.5 % level.    Central Visual Fields using the [employer] Red Dot Test  Right eye  Center superior Field:  Intact  Center inferior Field:  Intact  Right superior Field:  Intact  Right inferior Field:  Intact  Left superior Field:  Intact  Left inferior Field:  Intact    Left eye  Center superior Field:  Intact  Center inferior Field:  Intact  Right superior Field:  Intact  Right inferior Field:  Intact  Left superior Field:  Intact  Left inferior Field:  Intact    Visual Fields using the Kinetic Two Person Confrontation Test  Right eye  Superior Field: Intact  Inferior Field: Intact  Right Field: Intact  Left Field: Intact  Left eye  Superior Field: Intact  Inferior Field: Intact  Right Field: Intact  Left Field: Intact     Treatment     Lindsay received the treatments listed below:      therapeutic activities to improve functional performance for 38  minutes, including:    See vitals above     FOTO completed     See BiVABA results above     KT placed (2 - fan cut - whiplash taping) for assist in decreasing pain and inflammation in upper back, neck.     Patient Education and Home Exercises     Education provided:   - Progress towards goals     Written Home Exercises Provided: not given today.      Assessment     Lindsay arrived to therapy in a lot of pain.She is very stressed at this time.  FOTO score suggests moderate functional deficits (likely due to symptoms experienced). Right eye appears for functional than left eye.  Left pupil slow to react to light and accommodate. Pt experienced dizziness during the visual assessment. Left eye appears to be 20/125 on Snellen chart. All visual fields appear to intact. Pt would benefit from formal eye exam with a ophthalmologist. KT placed in fan form to assist in decreasing pain.     Lindsay is progressing well towards her goals and there are no updates to goals at this time. Pt  prognosis is Good.     Patient will continue to benefit from skilled outpatient occupational therapy to address the deficits listed in the problem list on initial evaluation provide patient/family education and to maximize patient's level of independence in the home and community environment.     Patient's spiritual, cultural and educational needs considered and patient agreeable to plan of care and goals.    Anticipated barriers to occupational therapy: symptoms from injury     Goals:    Short Term Goals (6 weeks) Status When Last Assessed  Progressing/ Met   1) Pt to be (I) with initial HEP    (progressing 5/29/2024)     2) Pt to be able to implement progressive relaxation / mindfulness techniques into daily routine (I).    (progressing 5/29/2024)     3) Vision to be further assessed and goal added as necessary    (progressing 5/29/2024)           LongTerm Goals (12 weeks) Status When Last Assessed  Progressing/ Met   1) Pt to be able to take eye breaks as appropriate when using technology    (progressing 5/29/2024)     2) Pt to be able to participate in social media influencing task for 15 minutes  with compensatory strategy as needed with no significant increase in symptoms    (progressing 5/29/2024)     3) Pt headache to reduce to 3/10 consistently over 5/7 days.  6-8/10  (progressing 5/29/2024)     4) Pt to report implementation of compensatory strategies to enable getting through daily routine without having to get into bed to reduce symptoms    (progressing 5/29/2024)        Additional functional goals to be added as pt progresses and per her priorities.    Plan   Certification Period/Plan of care expiration: 5/27/2024 to 8/23/2024.     Outpatient Occupational Therapy 2 times weekly for 12 weeks to include the following interventions: Manual Therapy, Neuromuscular Re-ed, Patient Education, Self Care, Therapeutic Activities, and Therapeutic Exercise.     Updates/Grading for next session: visual assessment      Allison Hui, OT   5/29/2024

## 2024-05-30 ENCOUNTER — PATIENT OUTREACH (OUTPATIENT)
Dept: ADMINISTRATIVE | Facility: OTHER | Age: 39
End: 2024-05-30
Payer: MEDICAID

## 2024-05-31 NOTE — PROGRESS NOTES
Outpatient Care Management   - Patient Assessment    Patient: Lindsay Sneed  MRN:  [id number]  Date of Service:  5/31/2024  Completed by:  Karolina Guzman LCSW  Referral Date: 05/10/2024    Reason for Visit   Patient presents with    Social Work Assessment     5/31/24    OPCM Enrollment Call     5/31/24       Brief Summary:  received a referral from outpatient provider, Mirhta, for the following Low/Mod SW psychosocial needs: Would like referral to alternative neurologist. May transition care to workman's compensation. Pt elected to participate in the OPCM program. Social work assessment and SDOH questionnaire completed. Pt reported she lives alone and is in a hotel due to the loss of her home/car after her injury at work. Her significant other is somewhat helpful when needed, but otherwise she does not have anyone to help her. She does have a 19 year old daughter, but does not want to rely on her due to her daughter having her own issues. Pt utilizes no DME and is independent with ADLs. She has new onset seizures and a concussion. The patient also requests assistance with housing. She reports she would like help with getting the right referrals and getting with neurology on the [facility] as she would like to stay in this area once she is able to obtain housing. Her father lives in [place] so she uses his mailing address, but he is not able to help her and she does not want to stay with him. She receives an income from workers comp and states that after she pays her weekly rate for the hotel, she has $218 left over for food and other essentials. She advised she does get food stamps. The hotel does provide breakfast and snacks so she does not experience food insecurity at this time.  Care plan was created in collaboration with patient/caregiver input.   INTERVENTIONS: Sent email to Pt ([email or web address]) with a list of apartment complexes that have rental assistance in the [facility]  along with the [facility] authority website to apply for assistance. Sent an application for Permanent Supportive Housing and information about the state waivers. Sent message to Pt PCP requested a referral be placed for pain management as she has an appt with another hospital system (Dr Apple) on Monday and they will need that referral in order for workers comp to cover it. Sent message to RN navigator requesting assistance with finding neuro closer to where this Pt is living.   CM ACTION PLAN: Continue to look for housing resources for this Pt. Follow up on referral and appt requests next week. Patient agrees to follow up call with in 1 week.  PATIENT SELF MANAGEMENT PLAN: Review email sent and complete the applications. Bring the Permanent Supportive Housing application to therapy on Tuesday to be scanned/emailed to this SW for submitting.    Worker's comp info:  [place]  CM is hired by above with Salma Carlin RN case manager: [phone]    Dr Apple fax: [phone] Attn Zachery with the auth dept.      Future Appointments   Date Time Provider Department Center   6/4/2024 11:15 AM Jessica Singh CCC-SLP [facility] RE[facility]   6/5/2024 11:00 AM Kevin Gonzales, PT [facility]   6/5/2024  1:45 PM Allison Hui OT [facility]   6/11/2024 11:00 AM Kevin Gonzales, PT [facility] [place]   6/25/2024 11:00 AM Kevin Gonzales, PT [facility] [place]   7/1/2024  9:00 AM Vignesh Burns MD [facility] NEURO [facility]   7/2/2024 11:00 AM Kevin Gonzales, PT [facility] RE[facility] [place]   7/9/2024 11:00 AM Kevin Gonzales, PT [facility] [place] [place]   7/16/2024 11:00 AM Kevin Gonzales, PT [facility] [place] [place]   7/23/2024 11:00 AM Kevin Gonzales, PT [facility] [place]   7/30/2024 11:00 AM Kevin Gonzales, PT [facility]     Karolina Guzman LCSW  Neuro Therapy   [facility]  [phone]

## 2024-06-03 DIAGNOSIS — F07.81 POST CONCUSSION SYNDROME: Primary | ICD-10-CM

## 2024-06-04 ENCOUNTER — OUTPATIENT CASE MANAGEMENT (OUTPATIENT)
Dept: ADMINISTRATIVE | Facility: OTHER | Age: 39
End: 2024-06-04
Payer: MEDICAID

## 2024-06-04 ENCOUNTER — CLINICAL SUPPORT (OUTPATIENT)
Dept: REHABILITATION | Facility: HOSPITAL | Age: 39
End: 2024-06-04
Attending: INTERNAL MEDICINE
Payer: MEDICAID

## 2024-06-04 DIAGNOSIS — F07.81 POST CONCUSSION SYNDROME: ICD-10-CM

## 2024-06-04 DIAGNOSIS — R41.841 COGNITIVE COMMUNICATION DEFICIT: Primary | ICD-10-CM

## 2024-06-04 PROCEDURE — 96125 COGNITIVE TEST BY HC PRO: CPT | Mod: PO

## 2024-06-04 NOTE — PLAN OF CARE
[facility]  Speech Therapy Evaluation - Concussion     Date: 6/4/2024     Name: Lindsay Sneed   MRN: [id number]    Therapy Diagnosis:   Encounter Diagnosis   Name Primary?    Post concussion syndrome     Physician: Vignesh Burns MD  Physician Orders: Ambulatory Referral to Speech Therapy   Medical Diagnosis: Post-Concussion Syndrome    Visit # / Visits Authorized:  1 / 1   Date of Evaluation:  6/4/2024   Insurance Authorization Period: 5/24/2024 - 12/31/2024  Plan of Care Certification:    6/4/2024 to 8/27/2024      Time In:1111  Time Out: 1210   Procedure Min.   Cognitive Communication Evaluation - including administration, scoring and interpretation   75     Precautions: Standard  Subjective   Date of Onset: December 2023  History of Current Condition:  Lindsay Sneed is a 39 y.o. female who presents to [facility] Outpatient Speech Therapy for evaluation and treatment secondary to post-concussion syndrome. Patient was referred to therapy by  Dr. Vignesh Burns  [facility]. Patient's concussion occurred in December 2023 after getting hit in the head with a metal pole at work. Pt reported initial symptoms included vomiting, sensitivity to light, brain fog. Currently, pt is complaining of brain fog, falling twice since concussion, dizziness. Deficits noted in recall. Patient endorsed changes in mood. Patient endorsed fatigue - patient additionally reports little to no sleep. Patient does not feel as though she has returned to baseline cognitive communication functioning. Patient works for the [employer]/ in [place]. Recent appointment with pain management (yesterday) - MD placed patient on anti inflammatory and muscle relaxer.     Previous history of:  Previous concussions: denied  Anxiety: endorsed - has been on Xanax in past  Depression: denied  Learning Disabilities: denied  ADHD: denied  Headaches and/or Migraines: denied    Past Medical History: Lindsay  Naren  has no past medical history on file.  Lindsay Sneed  has a past surgical history that includes Hysterectomy; Tubal ligation; Bladder surgery; and  section.  Medical Hx and Allergies: Lindsay has a current medication list which includes the following prescription(s): hydroxyzine hcl, meclizine, nortriptyline, and spironolactone.   Review of patient's allergies indicates:   Allergen Reactions    Adhesive     Bactrim [sulfamethoxazole-trimethoprim]        Prior Therapy:  currently in physical/occupational therapy  Social History:   Lives with: alone  Family responsibilities: med and bill management  Occupation: currently not working - works for [employer] in [employer]  Computer usage:  Driving: no - because of seizures    Education Level: associate's degree    Prior Level of Function: independent   Current Level of Function: brain fog, fatigue, cognitive complaints, ongoing pain    Pain:   Location: Headache/ neck/ back/full body  8/10 currently  8/10 on average  5/10 at best  10/10 at worst  Description: Aching and Throbbing  Aggravating Factors: Sitting, Standing, Laying, Bending, and Touching  Easing Factors: nothing    Nutrition:  No deficits, Oral, Thin liquids (IDDSI 0) and Regular consistencies (IDDSI 7)   Patient's Therapy Goals:  to improve cognitive functioning  Objective   Formal Assessment:    The Cognitive Communication Checklist for Acquired Brain Injury (CCCABI): The CCCABI is a referral tool designed to help flag communication difficulties after brain injury. This questionnaire screens for dysfunction in six domains: Functional Daily Communication, Auditory Comprehension & Information Processing, Expression, Discourse & Social Communication, Reading Comprehension, Written Expression, and Executive Functions & Self-Regulation. The results of the questionnaire are presented below.    Patient completed the questionnaire and  13/45 cognitive communication concerns were identified. These are  listed below.    For the below categories only the patients self-identified complaints are listed for each domain.    Domain Patient Self-Identified Complaints   Functional Daily Communications Difficulties with:  Family or social communications  Communication in the community  Communications needed for problem solving/decision making or self advocacy   Auditory Comprehension & Information Processing Difficulties with:  Focusing attention on what is said  Holding thoughts in mind while talking or listening   Expression, Discourse & Social Communication Difficulties with:  Speech sounds, muscle movements, voice, fluency, stuttering  Word finding, word retrieval, thinking of the word, vocabulary, word choice   Reading Comprehension Difficulties with:  Physical difficulties  Retaining read information over time, remembering, organizing  Attending to what is read, need to read everything twice   Written Expression Difficulties with:  Physical aspects of writing, hand movements   Thinking, Reasoning, Problem Solving, Executive Functions, Self-Regulation Difficulties with:  Insight, awareness, recognizing there is a problem  Planning, prioritizing, implementing, following through, evaluating, self-monitoring of communication   Reference: Yamila Freedman (2015) Cognitive Communication Checklist for Acquired Brain Injury (CCCABI) [facility] [place]; [facility] [place], [facility] 6J2 , [email or web address]      The Repeatable Battery for the Assessment of Neuropsychological Status (RBANS) Version C was administered to measure the patient's attention, language, visuospatial/constructional abilities, and immediate and delayed memory. The results are outlined below:    Domain Subtest Total Score Index Score   Immediate Memory List Learning 30   94    Story Memory 14    Visuospatial/  Constructional Figure Copy 20   84    Line Orientation 7    Language Picture Naming 10   91    Semantic Fluency 16    Attention Digit Span 8   82     Coding 45      Delayed Memory List Recall 7     107    List Recognition 20     Story Recall 6     Figure Recall 20        Total Scale   87     Percentile   19     Descriptor   LOW AVERAGE   Interpretation:  Index score: mean of 100, standard deviation of 15. Therefore, 130+ = Very Superior; 120-129 = Superior; 110-119 = High average;  = Average; 80-89 = Low Average; 70-79 = Borderline; 69 and below = Extremely Low. Apparently the most reliable score; factor in education level.    Immediate Memory Score: Recalling information following immediate presentation is assessed through the List Learning and Story Memory subtests. In the List Learning subtest, the patient is given 10 words to remember. This list is presented four times overall. In this subtest, the patient did demonstrate learning over the 4 trials. The patient recalled 7 items on trial one, 6 items on trial two, 8 items on trial three, and 9 items in trial 4. In the Story Memory subtest, the patient recalled 5 details on the first presentation and 9 details on the second presentation. Results of this domain indicate Average performance.  Visuospatial score: Perceiving spatial relations and constructing a spatially accurate copy of a drawing is assessed through the Figure Copy and Line Orientation subtests. The Figure Copy subtest asks the patient to copy a complex line drawing. The patient completed with no difficulty. The Line Orientation subtest the presents the patient with 12 line displays and asks the patient to match two given lines at the bottom to the display at the top.  The patient correctly identified 7/20. Results of this domain indicate Low average performance.  Language score: Naming common items and retrieving learned material is assessed through the Picture Naming and Semantic Fluency subtests. The Picture Naming subtest asks the patient to name 10 line drawings. The patient was able to accurately name 10/10 items. The Semantic  Fluency subtest asks the patient to name as many musical instruments as she can in 60 seconds. The patient was able to name 12 musical instruments. These results indicate Average performance.   Attention score: Attending to, holding and manipulating information presented visually and orally in working memory is assessed with use of the Digit Span and Coding subtests. The Digit Span subtest asks the patient to repeat progressively lengthening strings of numbers. The Coding subtest asks the patient to alternate attention between a key the given work and then to decode symbols to numbers. The patients results on these subtest indicate Low average performance.  Delayed Memory score: Anterograde memory capacity is assessed through the List Recall, List Recognition, Story Recall, and Figure Recall subtests. The List Recall subtest asks the patient to recall items from the list presented at the beginning of the test. The patient was able to recall 7/10 items. The List Recognition subtest has the patient recall whether a word was or was not in the original list. The patient accurately identified whether a word was or was not on the list in 20 of 20 items. On the Story Recall subtest, the patient was able to recall 6 details. Finally, on the Figure Recall, the patient recalled 20 details. Results of this domain indicate Average performance.    Treatment   Total Treatment Time Separate from Evaluation: not applicable   No treatment performed secondary to time to complete evaluation.     Education provided:   -role of Speech Therapy, goals/plan of care, scheduling/cancellations, insurance limitations with patient  -Additional Education provided:   General concussion education     Patient expressed understanding.     Home Program: not yet established this visit  Assessment     Lindsay presents to [facility] status post medical diagnosis of Post-Concussion Syndrome.      Interpretation of  objective assessment: Overall, according to the RBANS research, total Scale index is a good indicator of general cognitive functioning. The patient presents with a MILD cognitive communication disorder charaterized by deficits in attention and recall.     Demonstrates impairments including limitations as described in the problem list.     Positive prognostic factors: good motivation  Negative prognostic factors: n/a  Barriers to therapy: No barriers to therapy identified.     Patient's spiritual, cultural, and educational needs considered and patient agreeable to plan of care and goals.    Patient will benefit from skilled therapy.    Rehab Potential: good    Short Term Goals: (10 weeks) Current Progress:   1. Patient will complete selective attention tasks (auditory or visual) with 90% accuracy independently increase selective attention.    Progressing/ Not Met 6/4/2024   Established this date   2.  Patient will sustain attention to complete moderate to complex reasoning tasks for 2 minutes with one request for clarification to increase sustained attention.    Progressing/ Not Met 6/4/2024   Established this date   3.  Patient will use attention shifting strategies to shift attention between two tasks with no more than 3 cues or 90% accuracy to improve alternating attention.     Progressing/ Not Met 6/4/2024   Established this date    4. Patient will use Goal Plan Action Review strategy to complete moderate to complex reasoning, planning, or organization tasks with 90% accuracy independently to improve functional executive function skills.    Progressing/ Not Met 6/4/2024   Established this date    5. Patient will complete short term recall tasks after a 5 minutes delay with 90% accuracy  independently  with use of memory strategies to improve recall of information and generalization of memory strategies.    Progressing/ Not Met 6/4/2024   Established this date    6.  Patient will complete mental manipulation tasks  with 90% acc to improve working memory.    Progressing/ Not Met 6/4/2024   Established this date    7. Patient will demonstrate awareness of cognitive-communication difficulties in 1-2 situations in her day in which cognitive deficits could or did compromise efficiency and performance with minimal cueing.     Progressing/ Not Met 6/4/2024   Established this date    8. Patient will independently implement cognitive/sensory rest periods throughout day after identifying cognitive fatigue including limiting sensory input (sound, light, etc.)     Progressing/ Not Met 6/4/2024   Established this date    9. Patient will identify two solutions to functional daily problems with thought flexibility and minimal assistance for use of strategies.      Progressing/ Not Met 6/4/2024   Established this date    10. Patient will independently generate strategies to improve ability to complete tasks with enhanced accuracy and time, based on review of objective previous performance on trials of functional tasks with 80% accuracy.     Progressing/ Not Met 6/4/2024   Established this date    11. Patient will complete a task to improve attention and memory (I.e. sample bill paying activity, recipe, or multiple choice comprehension questions to 1 paragraphs) with 80% accuracy and natural environment noise distractions (TV news background, music, etc.).    Progressing/ Not Met 6/4/2024   Established this date        Long Term Goals: (12 weeks) Current Progress:    Patient will improve  attention skills to effectively attend to and communicate in complex daily living tasks in functional living environment.    Established this date     2.  Patient will demonstrate use of  self-monitoring during daily living activities to improve safety and awareness in functional living environment.    Established this date     3.  Patient will apply problem-solving strategies with visual support in daily living functional activities at home and in the  community.     Established this date     4.  Patient will predict objective performance on completion of actual tasks to increase independence with required return to daily living environment.     Established this date     5.  Patient will use appropriate memory strategies to schedule and recall weekly activities, express needs and recall names to maintain safety and participate socially in functional living environment.     Established this date       Plan     Recommended Treatment Plan:  Patient will participate in the [facility] program for speech therapy 2 times per week for 12 weeks to address her Cognition deficits, to educate patient and their family, and to participate in a home exercise program.    Other Recommendations:   N/a    Therapist's Name:   Jessica Singh CCC-SLP   6/4/2024

## 2024-06-05 ENCOUNTER — CLINICAL SUPPORT (OUTPATIENT)
Dept: REHABILITATION | Facility: HOSPITAL | Age: 39
End: 2024-06-05
Payer: MEDICAID

## 2024-06-05 DIAGNOSIS — M54.2 MUSCULOSKELETAL NECK PAIN: Primary | ICD-10-CM

## 2024-06-05 DIAGNOSIS — R42 NONSPECIFIC DIZZINESS: ICD-10-CM

## 2024-06-05 DIAGNOSIS — R52 PAIN: Primary | ICD-10-CM

## 2024-06-05 DIAGNOSIS — H54.7 IMPAIRED VISION: ICD-10-CM

## 2024-06-05 DIAGNOSIS — R11.0 DAILY NAUSEA: ICD-10-CM

## 2024-06-05 DIAGNOSIS — R42 DIZZINESS: ICD-10-CM

## 2024-06-05 DIAGNOSIS — R42 DYSEQUILIBRIUM: ICD-10-CM

## 2024-06-05 PROCEDURE — 97530 THERAPEUTIC ACTIVITIES: CPT | Mod: PO

## 2024-06-05 PROCEDURE — 97110 THERAPEUTIC EXERCISES: CPT | Mod: PN

## 2024-06-05 NOTE — PROGRESS NOTES
[facility]   Physical Therapy Treatment Note     Name: Lindsay Sneed  Clinic Number: [id number]    Therapy Diagnosis:   Encounter Diagnoses   Name Primary?    Musculoskeletal neck pain Yes    Dizziness     Dysequilibrium      Physician: Vignesh Burns MD    Visit Date: 6/5/2024    Physician Orders: PT Eval and Treat   Medical Diagnosis from Referral: G44.59 (ICD-10-CM) - Other complicated headache syndrome   Evaluation Date: 5/9/2024  Authorization Period Expiration: 3/25/2024  Plan of Care Expiration: 6/21/2024  Progress Note Due: 6/10/2024  Visit # / Visits authorized: 2/12  FOTO: 0/ 3 (FOTO not completed this date)      Precautions: Standard    PTA Visit #: 0/5     FOTO first follow up:   FOTO second follow up:     Time In: 1100  Time Out: 1145  Total Billable Time: 45 minutes    SUBJECTIVE     Pt reports: she saw pain management and was put on a new medication that is making her nauseated. She had OT and was taped which gave her some relief, but then it started making her chest hurt so she took it off. Her pain management doctor ordered an x-ray and MRI.     She was compliant with home exercise program.  Response to previous treatment: as above   Functional change: as above     Pain: 5/10  Location: bilateral neck      OBJECTIVE       None today    Treatment   PT extender available to assist with treatment as needed     Lindsay received the treatments listed below:      therapeutic exercises to develop strength, endurance, and ROM for 37 minutes including:    Nustep [id number]   Seated thoracic extension x10 with 5s hold   Open books x20 each side   Chin tucks x20 with 5s hold  Wall slides 3x10   Touchdowns level II 3x10   No money with Red Theraband 3x10     .  manual therapy techniques: Joint mobilizations were applied to the: CT junction and thoracic spine for 8 minutes, including:    CT gapping grade II-III  Thoracic PA mobs grade II-III       Patient Education and Home Exercises      Home Exercises Provided and Patient Education Provided     Education provided:   - Home Exercise Program to be performed twice daily   - education on chronic pain and central sensitization  - education on nutrition, hydration, sleep, and stress management    Written Home Exercises Provided: yes. Exercises were reviewed and Lindsay was able to demonstrate them prior to the end of the session.  Lindsay demonstrated good  understanding of the education provided. See EMR under Patient Instructions for exercises provided during therapy sessions    ASSESSMENT     Lindsay tolerated first follow up visit well. Chin tuck and scapular elevation decrease symptoms of numbness in Left arm with External rotation. Focus of treatment is improving thoracic and CT junction mobility and periscpaular strengthening. Will progress as tolerated.     Lindsay Is progressing well towards her goals.   Pt prognosis is Fair.     Pt will continue to benefit from skilled outpatient physical therapy to address the deficits listed in the problem list box on initial evaluation, provide pt/family education and to maximize pt's level of independence in the home and community environment.     Pt's spiritual, cultural and educational needs considered and pt agreeable to plan of care and goals.     Anticipated barriers to physical therapy: chronicity of symptoms, heightened pain response    Goals:     Short Term Goals: 3 weeks Date last assessed: Status:   1. The patient will be educated regarding an individualized home exercise program.  5/9/2024 New   2. The patient will participate in completion of the Headache Disability Index to objectively measure headache symptoms affecting functional activities.  5/9/2024 New   5. The patient will improve her AROM with cervical extension to 50 degrees to improve her  performance of activities of daily living involving vertical head turns.  5/9/2024 New         LongTerm Goals: 6 weeks Date last assessed: Status:    1. The patient will be compliant with a home exercise program. 5/9/2024 New   2. The patient will decrease complaint of worst headache pain from 10/10 to 8/10 to decrease interference from pain with functional activities. (MDIC for chronic musculoskeletal pain = 1 point)    5/9/2024 New   5. The patient will perform active cervical range of motion in all joints and plane to facilitate activities of daily living and functional mobility related to return to work.   5/9/2024 New         Plan      Plan of care Certification: 5/9/2024 to 6/21/2024.    Continue plan of care decreasing irritability of symptoms in order to address cervical dysfunction and progress tolerance to activity    Kevin Gonzales, PT, DPT   Board Certified Clinical Specialist in Orthopedic Physical Therapy  Board Certified Clinical Specialist in Sports Physical Therapy

## 2024-06-05 NOTE — PROGRESS NOTES
Outpatient Care Management   - Care Plan Follow Up    Patient: Lindsay Sneed  MRN:  [id number]  Date of Service:  6/5/2024  Completed by:  Karolina Guzman LCSW  Referral Date: 05/10/2024    Reason for Visit   Patient presents with    Other     Task completion    OPCM SW Follow Up Call       Brief Summary:  spoke with Pt regarding the PSH application and other needs. PSH willi was completed and sent to this SW with the assistance of her therapist. SW reviewed with her and then submitted via email. Discussed other needs. Pt MD still has not responded regarding the 1010 form they need to complete and an MRI order that needs to be placed. Sent another message. Resent the previous email with less attachments so that she can review the others she could not get to open. She reported she will apply for the waiver later today vs tomorrow morning.     Future Appointments   Date Time Provider Department Center   6/11/2024 11:00 AM Dinesh Valverde, NIKKIE [facility] [place]   6/12/2024  1:00 PM Jessica Singh CCCRaSLP [facility] [place] [facility]   6/12/2024  1:45 PM Teresa Dillon OT [facility] [place] [facility]   6/14/2024 12:30 PM Teresa Dillon OT [facility] [place] [facility]   6/25/2024 11:00 AM Kevin Gonzales PT [facility] [place]   6/25/2024  2:30 PM Jessica Singh CCC-SLP [facility] [place] [facility]   6/25/2024  4:00 PM Allison Hui OT [facility] [place] [facility]   6/27/2024 11:00 AM Vidhi Calvo CCC-SLP [facility] [place]   7/1/2024  9:00 AM Vignesh Burns MD [facility] NEURO [facility]   7/1/2024 11:00 AM Vidhi Calvo CCC-SLP [facility] [place]   7/2/2024 11:00 AM Kevin Gonzales PT [facility] [place]   7/8/2024 11:00 AM Vidhi Calvo CCC-SLP [facility] [place] [facility]   7/9/2024 11:00 AM Kevin Gonzales NIKKIE [facility] REHOP [place]   7/10/2024 10:15 AM Teresa Dillon OT [facility] [place] [facility]   7/10/2024 11:00 AM Azra Rosario, CCC-SLP [facility]  [place]   7/15/2024 10:15 AM Teresa Dillon, OT [facility]   7/15/2024 11:00 AM Vidhi Calvo, CCC-SLP [facility]   7/16/2024 11:00 AM Kevin Gonzales, PT [facility] [place]   7/18/2024 10:15 AM Vidhi Calvo, CCC-SLP [facility]   7/18/2024 11:00 AM Teresa Dillon, OT [facility]   7/22/2024 10:15 AM Allison Hui, OT [facility]   7/22/2024 11:00 AM Vidhi Calvo, CCC-SLP [facility]   7/23/2024 11:00 AM Kevin Gonzales, PT [facility] [place]   7/25/2024 10:15 AM Allison Hui, OT [facility]   7/25/2024 11:00 AM Vidhi Calvo, CCC-SLP [facility]   7/29/2024 10:15 AM Allison Hui, OT [facility]   7/29/2024 11:00 AM Vidhi Calvo, CCC-SLP [facility]   7/30/2024 11:00 AM Kevin Gonzales, PT [facility] [place]   8/1/2024 10:15 AM Vidhi Calvo, CCC-SLP [facility]   8/1/2024 11:00 AM Allison Hui, OT [facility]   8/5/2024 10:15 AM Allison Hui, OT [facility]   8/5/2024 11:00 AM Vidhi Calvo, CCC-SLP [facility]   8/8/2024 10:15 AM Vidhi Calvo, CCC-SLP [facility]   8/8/2024 11:00 AM Teresa Dillon, OT [facility]   8/12/2024 10:15 AM Teresa Dillon, OT [facility]   8/12/2024 11:00 AM Vidhi Calvo CCC-SLP [facility]   8/15/2024 10:15 AM Vidhi Calvo CCC-SLP [facility]   8/15/2024 11:00 AM Teresa Dillon OT [facility]   8/19/2024 10:15 AM Vidhi Calvo CCC-SLP [facility] [place] [facility]   8/19/2024 11:00 AM Allison Hui OT [facility]   8/22/2024 10:15 AM Allison Hui OT [facility]   8/22/2024 11:00 AM Vidhi Calvo CCC-SLP [facility]   8/26/2024 10:15 AM Allison Hui OT [facility]   8/26/2024 11:00 AM  Vidhi Calvo, CCC-SLP [facility] RE[facility]OP [place] [facility]     Karolina Guzman, [facility]  Neuro Therapy   [facility]  [phone]

## 2024-06-05 NOTE — PROGRESS NOTES
[facility]  Occupational Therapy Treatment Note     Date: 6/5/2024  Name: Lindsay Sneed  Clinic Number: [id number]    Therapy Diagnosis:   Encounter Diagnoses   Name Primary?    Pain Yes    Impaired vision     Daily nausea     Nonspecific dizziness        Physician: Vignesh Burns MD    Physician Orders: Eval and Treat  Medical Diagnosis: G44.59 (ICD-10-CM) - Other complicated headache syndrome   Evaluation Date: 5/27/2024  Insurance Authorization Period Expiration: 12/31/2024  Plan of Care Certification Period: 8/23/2024  Date of Return to MD: TBD  Visit # / Visits authorized: 2 / 12 + eval   FOTO: 1/ 3, last assessed on 5/29/2024     Precautions:  Standard, Fall, and seizure     Time In: 1:52 PM   Time Out: 2:34 PM  Total Billable Time:  42 minutes    Subjective     Patient reports: Pt reported she is exhausted. She reports her left arm has been going numb. She went to pain management and got new medicine but it made her nauseous.    Symptoms at the beginning of the session   Headache 8/10, nausea 9/10     Symptoms at the beginning of the session   Headache 7/10, nausea 7/10     She was not compliant with home exercise program given last session. (Not given)   Response to previous treatment:no concerns  Functional change: no changes per pt report    Pain: 8/10  Location:  shoulders + neck, left arm     Objective     Objective Measures updated at progress report unless specified.    At 1:55 PM , LR=185/80, HR=83     Treatment     Lindsay received the treatments listed below:      therapeutic activities to improve functional performance for 42 minutes, including:    See vitals above     Discussion of new symptoms, stressors of life, how it is being handled.     Discussed guided meditation for stress relief    Completed 10 minute guided meditation, supine on mat, lights off     Explored other options for meditation (light music with no lyrics, and sounds).     KT to inhibit upper traps  and KT to facilitate thoracic and cervical extensors  to decrease pain and allow for improved balance in muscular engagement. Als    Ed re: posture and how poor slouched posture could contribute to her complaints of numbness.    Patient Education and Home Exercises     Education provided:   - Progress towards goals     Written Home Exercises Provided: not given today.      Assessment     Lindsay arrived to therapy exhausted and in pain. Today, most of the session was spent exploring and completing stress relief options such as meditation. Guided meditation completed while supine, with lights off. KT placed to decreased pain in neck and shoulders. Ed on how proper posture could potentially decrease symptoms of numbness throughout left arm. Symptoms slightly decreased by the end of the session.     Lindsay is progressing well towards her goals and there are no updates to goals at this time. Pt prognosis is Good.     Patient will continue to benefit from skilled outpatient occupational therapy to address the deficits listed in the problem list on initial evaluation provide patient/family education and to maximize patient's level of independence in the home and community environment.     Patient's spiritual, cultural and educational needs considered and patient agreeable to plan of care and goals.    Anticipated barriers to occupational therapy: symptoms from injury     Goals:    Short Term Goals (6 weeks) Status When Last Assessed  Progressing/ Met   1) Pt to be (I) with initial HEP    (progressing 6/5/2024)     2) Pt to be able to implement progressive relaxation / mindfulness techniques into daily routine (I).    (progressing 6/5/2024)     3) Vision to be further assessed and goal added as necessary    (progressing 6/5/2024)           LongTerm Goals (12 weeks) Status When Last Assessed  Progressing/ Met   1) Pt to be able to take eye breaks as appropriate when using technology    (progressing 6/5/2024)     2) Pt to be  able to participate in social media influencing task for 15 minutes  with compensatory strategy as needed with no significant increase in symptoms    (progressing 6/5/2024)     3) Pt headache to reduce to 3/10 consistently over 5/7 days.  6-8/10  (progressing 6/5/2024)     4) Pt to report implementation of compensatory strategies to enable getting through daily routine without having to get into bed to reduce symptoms    (progressing 6/5/2024)        Additional functional goals to be added as pt progresses and per her priorities.    Plan   Certification Period/Plan of care expiration: 5/27/2024 to 8/23/2024.     Outpatient Occupational Therapy 2 times weekly for 12 weeks to include the following interventions: Manual Therapy, Neuromuscular Re-ed, Patient Education, Self Care, Therapeutic Activities, and Therapeutic Exercise.     Updates/Grading for next session: visual assessment; JOHN Hui OT   6/5/2024

## 2024-06-07 ENCOUNTER — OUTPATIENT CASE MANAGEMENT (OUTPATIENT)
Dept: ADMINISTRATIVE | Facility: OTHER | Age: 39
End: 2024-06-07
Payer: MEDICAID

## 2024-06-07 NOTE — PROGRESS NOTES
Outpatient Care Management   - Care Plan Follow Up    Patient: Lindsay Sneed  MRN:  [id number]  Date of Service:  6/7/2024  Completed by:  Karolina Guzman LCSW  Referral Date: 05/10/2024    Reason for Visit   Patient presents with    Other     Task completion       Brief Summary: Spoke with Madiha with [employer] per Pt request. Obtained the forms they are requesting for workers comp. Forwarded one form the MD office and advised MA of the need. Other release forms and letters sent to Medical records and the disability desk for review.     Future Appointments   Date Time Provider Department Center   6/11/2024 11:00 AM Dinesh Valverde, PT [facility] [place]   6/12/2024  1:00 PM Jessica Singh CCC-SLP [facility] [place] [facility]   6/12/2024  1:45 PM Teresa Dillon OT [facility] [place] [facility]   6/14/2024 12:30 PM Teresa Dillon OT [facility] [place] [facility]   6/25/2024 11:00 AM Kevin Gonzales PT [facility] [place]   6/25/2024  2:30 PM Jessica Singh CCC-SLP [facility] [place] [facility]   6/25/2024  4:00 PM Allison Hui OT [facility] [facility]   6/27/2024 11:00 AM Vidhi Calvo CCC-SLP [facility] [facility]   7/1/2024  9:00 AM Vignesh Burns MD [facility] NEURO [facility]   7/1/2024 11:00 AM Vidhi Calvo CCC-SLP [facility] [place] [facility]   7/2/2024 11:00 AM Kevin Gonzales, PT [facility] [place]   7/8/2024 11:00 AM Vidhi Calvo CCC-SLP [facility] [place] [facility]   7/9/2024 11:00 AM Kevin Gonzales PT [facility] [place]   7/10/2024 10:15 AM Teresa Dillon OT [facility] [place] [facility]   7/10/2024 11:00 AM Azra Rosario CCC-SLP [facility] [place] [facility]   7/15/2024 10:15 AM Teresa Dillon OT [facility] REHOP [place] [facility]   7/15/2024 11:00 AM Vidhi Calvo CCC-SLP [facility] REHOP [facility] [facility]   7/16/2024 11:00 AM Kevin Gonzales, NIKKIE [facility] REHOP [place]   7/18/2024 10:15 AM Vidhi Calvo CCC-SLP [facility] REHOP [facility]   7/18/2024 11:00 AM Teresa Dillon, OT  [facility] [place] [facility]   7/22/2024 10:15 AM Allison Hui, OT [facility] [place] [facility]   7/22/2024 11:00 AM Vidhi Calvo, CCC-SLP [facility] [place] [facility]   7/23/2024 11:00 AM Kevin Gonzales, PT [facility] [place]   7/25/2024 10:15 AM Allison Hui, OT [facility] [place] [facility]   7/25/2024 11:00 AM Vidhi Calvo, CCC-SLP [facility] [place] [facility]   7/29/2024 10:15 AM Allison Hui, OT [facility] [place] [facility]   7/29/2024 11:00 AM Vidhi Calvo, CCC-SLP [facility] [place] [facility]   7/30/2024 11:00 AM eKvin Gonzales, PT [facility] [place]   8/1/2024 10:15 AM Vidhi Calvo, CCC-SLP [facility] [place] [facility]   8/1/2024 11:00 AM Allison Hui, OT [facility] [place] [facility]   8/5/2024 10:15 AM Allison Hui, OT [facility] [place] [facility]   8/5/2024 11:00 AM Vidhi Calvo, CCC-SLP [facility] [place] [facility]   8/8/2024 10:15 AM Vidhi Calvo, CCC-SLP [facility] [place] [facility]   8/8/2024 11:00 AM Teresa Dillon, OT [facility] [place] [facility]   8/12/2024 10:15 AM Teresa Dillon, OT [facility] [place] [facility]   8/12/2024 11:00 AM Vidhi Calvo, CCC-SLP [facility] [place] [facility]   8/15/2024 10:15 AM Vidhi Calvo, CCC-SLP [facility] [place] [facility]   8/15/2024 11:00 AM Teresa Dillon, OT [facility] [place] [facility]   8/19/2024 10:15 AM Vidhi Calvo, CCC-SLP [facility] [place] [facility]   8/19/2024 11:00 AM Allison Hui OT [facility] [place] [facility]   8/22/2024 10:15 AM Allison Hui OT [facility] [place] [facility]   8/22/2024 11:00 AM Vidhi Calvo CCC-SLP [facility]   8/26/2024 10:15 AM Allison Hui OT [facility]   8/26/2024 11:00 AM Vidhi Calvo CCC-SLP [facility] [place] [facility]     Karolina Guzman [facility]  Neuro Therapy   [facility]  [phone]

## 2024-06-10 ENCOUNTER — OUTPATIENT CASE MANAGEMENT (OUTPATIENT)
Dept: ADMINISTRATIVE | Facility: OTHER | Age: 39
End: 2024-06-10
Payer: MEDICAID

## 2024-06-10 NOTE — PROGRESS NOTES
Outpatient Care Management   - Care Plan Follow Up    Patient: Lindsay Sneed  MRN:  [id number]  Date of Service:  6/10/2024  Completed by:  Karolina Guzman LCSW  Referral Date: 05/10/2024    Reason for Visit   Patient presents with    OPCM SW Follow Up Call       Brief Summary:  received call from Pt reporting she has to come up with money by the end of the day to continue staying in the hotel or she will be checked out. Pt reported she does not have the funds and is unsure what to do but she's already called everywhere for assistance and does not know where she will get the money. Discussed she should request assistance from workers comp and this SW will call [employer] along with some other community resources. Contacted  [facility], [facility], and [employer]. Left messages and requested they call Pt to offer assistance if they can offer it. Pt reports she owes $600 by tomorrow.    6/11/24: Pt received call from Pt reporting no one has called her and she's not found any help. Contacted [employer] and other options again. Unable to find emergency funds. Advised her if they kick her out, she should go to a shelter. Pt is concerned about her stuff. SW advised her to try the local churches for assistance and keep trying to work with workers comp on an advance. She reported she did reach her .     Future Appointments   Date Time Provider Department Center   6/11/2024 11:00 AM Dinesh Valverde, PT [facility] [place]   6/12/2024  1:00 PM Jessica Singh CCC-SLP [facility] [place] [facility]   6/12/2024  1:45 PM Teresa Dillon OT [facility] [place] [facility]   6/14/2024 12:30 PM Teresa Dillon OT [facility] [place] [facility]   6/25/2024 11:00 AM Kevin Gonzales, NIKKIE [facility] [place]   6/25/2024  2:30 PM Jessica Singh CCC-SLP [facility] [place] [facility]   6/25/2024  4:00 PM Allison Hui OT [facility] [place] [facility]   6/27/2024 11:00 AM Umesh  Vidhi, CCC-SLP [facility] [place] [facility]   7/1/2024  9:00 AM Vignesh Burns MD [facility] NEURO [facility]   7/1/2024 11:00 AM Vidhi Calvo, CCC-SLP [facility] [place] [facility]   7/2/2024 11:00 AM Kevin Gonzales, PT [facility] [place]   7/8/2024 11:00 AM Vidhi Calvo, CCC-SLP [facility] [place] [facility]   7/9/2024 11:00 AM Kevin Gonzales, PT [facility] [place]   7/10/2024 10:15 AM Teresa Dillon, OT [facility] [place] [facility]   7/10/2024 11:00 AM Azra Rosario, CCC-SLP [facility] [place] [facility]   7/15/2024 10:15 AM Teresa Dillon, OT [facility] [place] [facility]   7/15/2024 11:00 AM Vidhi Calvo, CCC-SLP [facility] [place] [facility]   7/16/2024 11:00 AM Kevin Gonzales, PT [facility] [place]   7/18/2024 10:15 AM Vidhi Calvo, CCC-SLP [facility] [place] [facility]   7/18/2024 11:00 AM Teresa Dillon, OT [facility] [place] [facility]   7/22/2024 10:15 AM Allison Hui, OT [facility] [place] [facility]   7/22/2024 11:00 AM Vidhi Calvo, CCC-SLP [facility] [place] [facility]   7/23/2024 11:00 AM Kevin Gonzales, PT [facility] [place]   7/25/2024 10:15 AM Allison Hiu, OT [facility] [place] [facility]   7/25/2024 11:00 AM Vidhi Calvo, CCC-SLP [facility] [place] [facility]   7/29/2024 10:15 AM Allison Hui, OT [facility] [place] [facility]   7/29/2024 11:00 AM Vidhi Calvo, CCC-SLP [facility] [place] [facility]   7/30/2024 11:00 AM Kevin Gonzales, PT [facility] [place]   8/1/2024 10:15 AM Vidhi Calvo CCC-SLP [facility] [place] [facility]   8/1/2024 11:00 AM Allison Hui, ASTON [facility] [place] [facility]   8/5/2024 10:15 AM Allison Hui, OT [facility] [place] [facility]   8/5/2024 11:00 AM Vidhi Calvo CCC-SLP [facility] [place] [facility]   8/8/2024 10:15 AM Vidhi Calvo CCC-SLP [facility] [place] [facility]   8/8/2024 11:00 AM Teresa Dillon OT [facility] [place] [facility]   8/12/2024 10:15 AM Teresa Diloln OT [facility] [place] [facility]   8/12/2024 11:00 AM Vidhi Calvo CCC-SLP [facility] [place] [facility]   8/15/2024  10:15 AM Vidhi Calvo CCC-SLP [facility] [place] [facility]   8/15/2024 11:00 AM Teresa Dillon OT [facility] [place] [facility]   8/19/2024 10:15 AM Vidhi Calvo CCC-SLP [facility] [place] [facility]   8/19/2024 11:00 AM Allison Hui OT [facility] [place] [facility]   8/22/2024 10:15 AM Allison Hui, OT [facility] [place] [facility]   8/22/2024 11:00 AM Vidhi Calvo CCC-SLP [facility] [place] [facility]   8/26/2024 10:15 AM Allison Hui, OT [facility] [place] [facility]   8/26/2024 11:00 AM Vidhi Calvo CCC-SLP [facility] [place] [facility]     [facility]  Neuro Therapy   [facility]  [phone]

## 2024-06-11 ENCOUNTER — CLINICAL SUPPORT (OUTPATIENT)
Dept: REHABILITATION | Facility: HOSPITAL | Age: 39
End: 2024-06-11
Payer: MEDICAID

## 2024-06-11 DIAGNOSIS — M54.2 MUSCULOSKELETAL NECK PAIN: Primary | ICD-10-CM

## 2024-06-11 DIAGNOSIS — R42 DIZZINESS: ICD-10-CM

## 2024-06-11 DIAGNOSIS — R42 DYSEQUILIBRIUM: ICD-10-CM

## 2024-06-11 PROCEDURE — 97110 THERAPEUTIC EXERCISES: CPT | Mod: PN

## 2024-06-11 NOTE — PROGRESS NOTES
[facility]ER[facility]LL[facility]   Physical Therapy Treatment Note     Name: Lindsay Delongto  Clinic Number: [id number]    Therapy Diagnosis:   Encounter Diagnoses   Name Primary?    Musculoskeletal neck pain Yes    Dizziness     Dysequilibrium      Physician: Vignesh Burns MD    Visit Date: 6/11/2024    Physician Orders: PT Eval and Treat   Medical Diagnosis from Referral: G44.59 (ICD-10-CM) - Other complicated headache syndrome   Evaluation Date: 5/9/2024  Authorization Period Expiration: 3/25/2024  Plan of Care Expiration: 6/21/2024  Progress Note Due: 6/10/2024  Visit # / Visits authorized: 3/12  FOTO: 0/ 3 (FOTO not completed this date)      Precautions: Standard    PTA Visit #: 0/5     FOTO first follow up:   FOTO second follow up:     Time In: 11:04 am   Time Out: 12:00 pm   Total Billable Time: 56 minutes    SUBJECTIVE     Pt reports: That she is still in a ton of pain in the neck, back of the head, and into the shoulder.  She was able to see pain management and they ordered imaging of the head and neck but she is waiting to get those.  She is getting full arm numbness down the left and the left side in general is much worse.      She was compliant with home exercise program.  Response to previous treatment: as above   Functional change: as above     Pain: 5/10  Location: bilateral neck      OBJECTIVE     None today    Treatment   PT extender available to assist with treatment as needed     Lindsay received the treatments listed below:      therapeutic exercises to develop strength, endurance, and ROM for 43 minutes including:    UBE 8 Minutes 1 Minute FWD and 1 Minute BWD   Seated Thoracic Extension 1 x 15 with 3s hold   Chin Tucks 2 x 10 with 5s Hold  Horizontal Abduction GTB 2 x 12   Open Books x20 each side   Wall Slides 3 x 10   Low Rows YTB 3 x 10     Not Today:   Nustep [id number]   Touchdowns level II 3x10   No money with Red Theraband 3x10     manual therapy techniques: Joint mobilizations were  applied to the: CT junction and thoracic spine for 13 minutes, including:    Soft Tissue Mobilization to Cervical Paraspinals and Upper Trap   Cervical Side Glides Grade I Bilaterally     Not Today:   CT gapping grade II-III  Thoracic PA mobs grade II-III       Patient Education and Home Exercises     Home Exercises Provided and Patient Education Provided     Education provided:   - Home Exercise Program to be performed twice daily   - education on chronic pain and central sensitization  - education on nutrition, hydration, sleep, and stress management    Written Home Exercises Provided: yes. Exercises were reviewed and Lindsay was able to demonstrate them prior to the end of the session.  Lindsay demonstrated good  understanding of the education provided. See EMR under Patient Instructions for exercises provided during therapy sessions    ASSESSMENT     Lindsay presented to physical therapy with significant irritability and guarding/avoidance to manual interventions, very low grade cervical side glides were performed to attempt to decrease irritation and guarding followed by gentle soft tissue mobilization for desensitization and decreased guarding but patient was unable to tolerate due to reported pain and hypersensitivity to both light and deeper pressure.  Patient completed UBE for sympathetic nervous modulation attempt to down regulate nervous system, but there was minimal improvements.  Patient worked to improve active thoracic mobility.  She followed up mobility interventions with periscapular strengthening in an attempt to offload the cervical spine and improve postural stability.  She was limited by upper extremity numbness and sensitivity.  Patient voiced the troubles that she is currently experiencing with significant amount of stressors which certainly can be contributing to paint levels.  She will continue to address periscapular strengthening and mobility as appropriate and tolerated.      Lindsay Is  progressing well towards her goals.   Pt prognosis is Fair.     Pt will continue to benefit from skilled outpatient physical therapy to address the deficits listed in the problem list box on initial evaluation, provide pt/family education and to maximize pt's level of independence in the home and community environment.     Pt's spiritual, cultural and educational needs considered and pt agreeable to plan of care and goals.     Anticipated barriers to physical therapy: chronicity of symptoms, heightened pain response    Goals:     Short Term Goals: 3 weeks Date last assessed: Status:   1. The patient will be educated regarding an individualized home exercise program.  5/9/2024 New   2. The patient will participate in completion of the Headache Disability Index to objectively measure headache symptoms affecting functional activities.  5/9/2024 New   5. The patient will improve her AROM with cervical extension to 50 degrees to improve her  performance of activities of daily living involving vertical head turns.  5/9/2024 New         LongTerm Goals: 6 weeks Date last assessed: Status:   1. The patient will be compliant with a home exercise program. 5/9/2024 New   2. The patient will decrease complaint of worst headache pain from 10/10 to 8/10 to decrease interference from pain with functional activities. (MDIC for chronic musculoskeletal pain = 1 point)    5/9/2024 New   5. The patient will perform active cervical range of motion in all joints and plane to facilitate activities of daily living and functional mobility related to return to work.   5/9/2024 New         Plan      Plan of care Certification: 5/9/2024 to 6/21/2024.    Continue plan of care decreasing irritability of symptoms in order to address cervical dysfunction and progress tolerance to activity    Dinesh Valverde, PT, DPT

## 2024-06-12 ENCOUNTER — CLINICAL SUPPORT (OUTPATIENT)
Dept: REHABILITATION | Facility: HOSPITAL | Age: 39
End: 2024-06-12
Attending: INTERNAL MEDICINE
Payer: MEDICAID

## 2024-06-12 ENCOUNTER — CLINICAL SUPPORT (OUTPATIENT)
Dept: REHABILITATION | Facility: HOSPITAL | Age: 39
End: 2024-06-12
Payer: MEDICAID

## 2024-06-12 DIAGNOSIS — F07.81 POST CONCUSSION SYNDROME: Primary | ICD-10-CM

## 2024-06-12 DIAGNOSIS — R11.0 DAILY NAUSEA: ICD-10-CM

## 2024-06-12 DIAGNOSIS — R42 NONSPECIFIC DIZZINESS: ICD-10-CM

## 2024-06-12 DIAGNOSIS — R41.841 COGNITIVE COMMUNICATION DEFICIT: ICD-10-CM

## 2024-06-12 DIAGNOSIS — R52 PAIN: Primary | ICD-10-CM

## 2024-06-12 DIAGNOSIS — H54.7 IMPAIRED VISION: ICD-10-CM

## 2024-06-12 PROCEDURE — 92507 TX SP LANG VOICE COMM INDIV: CPT | Mod: PO

## 2024-06-12 PROCEDURE — 97530 THERAPEUTIC ACTIVITIES: CPT | Mod: PO

## 2024-06-12 NOTE — PROGRESS NOTES
[facility]  Speech Therapy Treatment Note- Neurological Rehabilitation  Date: 6/12/2024     Name: Lindsay Sneed   MRN: [id number]   Therapy Diagnosis:   Encounter Diagnoses   Name Primary?    Post concussion syndrome Yes    Cognitive communication deficit    Physician: Vignesh Burns MD  Physician Orders: Ambulatory Referral to Speech Therapy   Medical Diagnosis: Post concussion syndrome [F07.81]     Visit #/ Visits Authorized: 1/ 24  Date of Evaluation:  6/4/2024  Insurance Authorization Period: 6/3/2024 - 8/10/2024  Plan of Care Expiration Date:    due 8/27/2024  Extended Plan of Care:  n/a   Progress Note: due 8/13/2024     Time In:  1500  Time Out:  1550  Total Billable Time: 50    Precautions: Standard  Subjective:   Patient reports: noted with improved headache and nausea with taping with OT but noted to have returned upon starting speech therapy.    She was compliant to home exercise program.   Response to previous treatment: fair  Pain Scale: 7/10 on a Visual Analog Scale currently.  Pain Location: headache  Objective:   UNTIMED  Procedure Min.   Speech Language Voice Therapy 50       Short Term Goals: (10 weeks) Current Progress:   1. Patient will complete selective attention tasks (auditory or visual) with 90% accuracy independently increase selective attention.     Progressing/ Not Met 6/12/2024    -Not addressed this session.    2.  Patient will sustain attention to complete moderate to complex reasoning tasks for 2 minutes with one request for clarification to increase sustained attention.     Progressing/ Not Met 6/12/2024    -Not addressed this session.    3.  Patient will use attention shifting strategies to shift attention between two tasks with no more than 3 cues or 90% accuracy to improve alternating attention.      Progressing/ Not Met 6/12/2024    -Not addressed this session.    4. Patient will use Goal Plan Action Review strategy to complete moderate to complex reasoning,  planning, or organization tasks with 90% accuracy independently to improve functional executive function skills.     Progressing/ Not Met 6/12/2024    See education below    5. Patient will complete short term recall tasks after a 5 minutes delay with 90% accuracy  independently  with use of memory strategies to improve recall of information and generalization of memory strategies.     Progressing/ Not Met 6/12/2024    -Not addressed this session.    6.  Patient will complete mental manipulation tasks with 90% acc to improve working memory.     Progressing/ Not Met 6/12/2024    -Not addressed this session.    7. Patient will demonstrate awareness of cognitive-communication difficulties in 1-2 situations in her day in which cognitive deficits could or did compromise efficiency and performance with minimal cueing.      Progressing/ Not Met 6/12/2024    See education below    8. Patient will independently implement cognitive/sensory rest periods throughout day after identifying cognitive fatigue including limiting sensory input (sound, light, etc.)      Progressing/ Not Met 6/12/2024    Established this date    9. Patient will identify two solutions to functional daily problems with thought flexibility and minimal assistance for use of strategies.       Progressing/ Not Met 6/12/2024    Established this date    10. Patient will independently generate strategies to improve ability to complete tasks with enhanced accuracy and time, based on review of objective previous performance on trials of functional tasks with 80% accuracy.      Progressing/ Not Met 6/12/2024    -Not addressed this session.    11. Patient will complete a task to improve attention and memory (I.e. sample bill paying activity, recipe, or multiple choice comprehension questions to 1 paragraphs) with 80% accuracy and natural environment noise distractions (TV news background, music, etc.).     Progressing/ Not Met 6/12/2024    -Not addressed this  session.      Patient Education/Response:   Patient educated regarding the followin. Speech therapy role/POC  2. Continued importance of communication with case management (Karolina Guzman) to assist in patient socioecomomic needs  3. Introduction to cognitive buckets    Home program established: Patient instructed to continue prior program  Patient verbalized understanding to all above education provided.     See Electronic Medical Record under Patient Instructions for exercises provided throughout therapy.  Assessment:   Patient in good spirits across session. Majority of session today in education and structured conversation regarding speech therapy role/Plan of Care, impact and handling high stress with ongoing post-concussion symptoms, and introduction to cognitive buckets. Speech Language Pathologist encouraged patient to continue communication with social work in effort to alleviate stress that is adding to patient's cognitive load - patient in agreement and voiced understanding. Plan to address use of scheduling/appointment book to improve structure of day, recall, time management, and minimalize needs/wants to reduce cognitive load and reduce negative symptoms (I.e. headache, nausea, sensitivity to light) since concussion. Patient in agreement.     Lindsay is progressing well towards her goals. Current goals remain appropriate. Goals to be updated as necessary.     Patient prognosis is Good. Patient will continue to benefit from skilled outpatient speech and language therapy to address the deficits listed in the problem list on initial evaluation, provide patient/family education and to maximize patient's level of independence in the home and community environment.   Medical necessity is demonstrated by the following IMPAIRMENTS:  Cognition: Deficits in executive functioning, attention, and memory prevent the pt from relaying medically and safety relevant information in a timely manner in a state of  emergency.   Barriers to Therapy: none  Patient's spiritual, cultural and educational needs considered and patient agreeable to plan of care and goals.  Plan:   Continue Plan of Care with focus on rehabilitation and compensation for cognitive-linguistic functioning s/p concussion in December 2023    Jessica Singh CCC-SLP   6/12/2024

## 2024-06-12 NOTE — PROGRESS NOTES
[facility]  Occupational Therapy Treatment Note     Date: 6/12/2024  Name: Lindsay Sneed  Clinic Number: [id number]    Therapy Diagnosis:   Encounter Diagnoses   Name Primary?    Pain Yes    Impaired vision     Daily nausea     Nonspecific dizziness        Physician: Vignesh Burns MD    Physician Orders: Eval and Treat  Medical Diagnosis: G44.59 (ICD-10-CM) - Other complicated headache syndrome   Evaluation Date: 5/27/2024  Insurance Authorization Period Expiration: 12/31/2024  Progress Note(s) Due: 6/28/24, 7/26/24  Plan of Care Certification Period: 8/23/2024  Date of Return to MD: TBD  Visit # / Visits authorized: 3 / 12 + eval   FOTO: 1/ 3, last assessed on 5/29/2024       Precautions:  Standard, Fall, and seizure     Time In: 1355  Time Out: 1435  Total Billable Time:  40 minutes    Subjective     Patient reports: Pt reports she is still exhausted. She is concerned about getting evicted from her hotel.     Symptoms at the beginning of the session  Headache 7/10, nausea 9/10     Symptoms at the end of the session   Headache 7/10, nausea 6/10     She was not compliant with home exercise program given last session. (Daily use of guided relaxation)    Response to previous treatment: no concerns with pink tape, but the black led to a break out and itching.     She did feel that the tape was helping and felt conflicted when she removed it.    Functional change: no changes per pt report    Pain: 6/10  Location:  shoulders + back     Objective     Objective Measures updated at progress report unless specified.    At 1358, EY=285/78, HR=77     Treatment     Lindsay received the treatments listed below:      therapeutic activities to improve functional performance for 42 minutes, including:    See vitals above     As patient wanted to continue kinesiotape but complained of breaking out with black tape, OT used Soft-Touch.  KT to inhibit bilateral upper traps (Y-cut, applied from to insertion  "to origin, ~15% tension in therapeutic range and no tension in 1" tails).  KT to facilitate bilateral thoracic/ cervical spinal extensors (Y-cut, applied from origin to insertion, ~35% tension in therapeutic range and no tension in 1" tails).     Discussion of changes in symptoms, stressors of life, stress management techniques.   Completed 15 minute progressive muscle relaxation supine on mat with lights off.   Ed patient how to find additional meditations on YouTube.     Reviewed ed re: posture and how poor slouched posture could contribute to her complaints of numbness.    Patient Education and Home Exercises     Education provided:   - Progress towards goals     Written Home Exercises Provided: not given today.      Assessment     Lindsay arrived to therapy exhausted and in pain again today. Slight improvement with kinesiotape and after progressive muscle relaxation. Increased time required to process and max cues for redirection. Re-ed on how proper posture could potentially decrease symptoms of numbness throughout left arm.     Lindsay is progressing slowly towards her goals and there are no updates to goals at this time. Pt prognosis is Fair.     Patient will continue to benefit from skilled outpatient occupational therapy to address the deficits listed in the problem list on initial evaluation provide patient/family education and to maximize patient's level of independence in the home and community environment.     Patient's spiritual, cultural and educational needs considered and patient agreeable to plan of care and goals.    Anticipated barriers to occupational therapy: symptoms from injury     Goals:    Short Term Goals (6 weeks) Status When Last Assessed  Progressing/ Met   1) Pt to be (I) with initial HEP    (progressing 6/12/2024)     2) Pt to be able to implement progressive relaxation / mindfulness techniques into daily routine (I).    (progressing 6/12/2024)     3) Vision to be further assessed and " goal added as necessary    (progressing 6/12/2024)           LongTerm Goals (12 weeks) Status When Last Assessed  Progressing/ Met   1) Pt to be able to take eye breaks as appropriate when using technology    (progressing 6/12/2024)     2) Pt to be able to participate in social media influencing task for 15 minutes  with compensatory strategy as needed with no significant increase in symptoms    (progressing 6/12/2024)     3) Pt headache to reduce to 3/10 consistently over 5/7 days.  6-8/10  (progressing 6/12/2024)     4) Pt to report implementation of compensatory strategies to enable getting through daily routine without having to get into bed to reduce symptoms    (progressing 6/12/2024)        Additional functional goals to be added as pt progresses and per her priorities.    Plan   Certification Period/Plan of care expiration: 5/27/2024 to 8/23/2024.     Outpatient Occupational Therapy 2 times weekly for 12 weeks to include the following interventions: Manual Therapy, Neuromuscular Re-ed, Patient Education, Self Care, Therapeutic Activities, and Therapeutic Exercise.     Updates/Grading for next session: visual assessment; JOHN Dillon OT   6/12/2024

## 2024-06-14 ENCOUNTER — CLINICAL SUPPORT (OUTPATIENT)
Dept: REHABILITATION | Facility: HOSPITAL | Age: 39
End: 2024-06-14
Payer: MEDICAID

## 2024-06-14 ENCOUNTER — HOSPITAL ENCOUNTER (EMERGENCY)
Facility: HOSPITAL | Age: 39
Discharge: HOME | End: 2024-06-14
Attending: EMERGENCY MEDICINE
Payer: MEDICAID

## 2024-06-14 VITALS
BODY MASS INDEX: 33.2 KG/M2 | SYSTOLIC BLOOD PRESSURE: 114 MMHG | HEART RATE: 82 BPM | TEMPERATURE: 99 F | WEIGHT: 212 LBS | OXYGEN SATURATION: 100 % | DIASTOLIC BLOOD PRESSURE: 67 MMHG | RESPIRATION RATE: 18 BRPM

## 2024-06-14 DIAGNOSIS — R52 PAIN: Primary | ICD-10-CM

## 2024-06-14 DIAGNOSIS — R07.9 CHEST PAIN: ICD-10-CM

## 2024-06-14 DIAGNOSIS — R11.0 DAILY NAUSEA: ICD-10-CM

## 2024-06-14 DIAGNOSIS — H54.7 IMPAIRED VISION: ICD-10-CM

## 2024-06-14 DIAGNOSIS — R42 NONSPECIFIC DIZZINESS: ICD-10-CM

## 2024-06-14 DIAGNOSIS — K21.9 GASTROESOPHAGEAL REFLUX DISEASE, UNSPECIFIED WHETHER ESOPHAGITIS PRESENT: Primary | ICD-10-CM

## 2024-06-14 LAB
ALBUMIN SERPL BCP-MCNC: 3.9 G/DL (ref 3.5–5.2)
ALP SERPL-CCNC: 101 U/L (ref 55–135)
ALT SERPL W/O P-5'-P-CCNC: 14 U/L (ref 10–44)
ANION GAP SERPL CALC-SCNC: 9 MMOL/L (ref 8–16)
AST SERPL-CCNC: 17 U/L (ref 10–40)
BASOPHILS # BLD AUTO: 0.07 K/UL (ref 0–0.2)
BASOPHILS NFR BLD: 0.8 % (ref 0–1.9)
BILIRUB SERPL-MCNC: 0.5 MG/DL (ref 0.1–1)
BUN SERPL-MCNC: 10 MG/DL (ref 6–20)
CALCIUM SERPL-MCNC: 9.5 MG/DL (ref 8.7–10.5)
CHLORIDE SERPL-SCNC: 106 MMOL/L (ref 95–110)
CO2 SERPL-SCNC: 24 MMOL/L (ref 23–29)
CREAT SERPL-MCNC: 0.8 MG/DL (ref 0.5–1.4)
DIFFERENTIAL METHOD BLD: ABNORMAL
EOSINOPHIL # BLD AUTO: 0.1 K/UL (ref 0–0.5)
EOSINOPHIL NFR BLD: 0.7 % (ref 0–8)
ERYTHROCYTE [DISTWIDTH] IN BLOOD BY AUTOMATED COUNT: 12.5 % (ref 11.5–14.5)
EST. GFR  (NO RACE VARIABLE): >60 ML/MIN/1.73 M^2
GLUCOSE SERPL-MCNC: 91 MG/DL (ref 70–110)
HCT VFR BLD AUTO: 43.4 % (ref 37–48.5)
HGB BLD-MCNC: 14.2 G/DL (ref 12–16)
IMM GRANULOCYTES # BLD AUTO: 0.03 K/UL (ref 0–0.04)
IMM GRANULOCYTES NFR BLD AUTO: 0.3 % (ref 0–0.5)
LYMPHOCYTES # BLD AUTO: 1.7 K/UL (ref 1–4.8)
LYMPHOCYTES NFR BLD: 19 % (ref 18–48)
MCH RBC QN AUTO: 31.5 PG (ref 27–31)
MCHC RBC AUTO-ENTMCNC: 32.7 G/DL (ref 32–36)
MCV RBC AUTO: 96 FL (ref 82–98)
MONOCYTES # BLD AUTO: 0.5 K/UL (ref 0.3–1)
MONOCYTES NFR BLD: 5.9 % (ref 4–15)
NEUTROPHILS # BLD AUTO: 6.7 K/UL (ref 1.8–7.7)
NEUTROPHILS NFR BLD: 73.3 % (ref 38–73)
NRBC BLD-RTO: 0 /100 WBC
PLATELET # BLD AUTO: 298 K/UL (ref 150–450)
PMV BLD AUTO: 9.9 FL (ref 9.2–12.9)
POTASSIUM SERPL-SCNC: 4.1 MMOL/L (ref 3.5–5.1)
PROT SERPL-MCNC: 7.8 G/DL (ref 6–8.4)
RBC # BLD AUTO: 4.51 M/UL (ref 4–5.4)
SODIUM SERPL-SCNC: 139 MMOL/L (ref 136–145)
TROPONIN I SERPL DL<=0.01 NG/ML-MCNC: 0.01 NG/ML (ref 0–0.03)
WBC # BLD AUTO: 9.14 K/UL (ref 3.9–12.7)

## 2024-06-14 PROCEDURE — 99285 EMERGENCY DEPT VISIT HI MDM: CPT | Mod: 25

## 2024-06-14 PROCEDURE — 97530 THERAPEUTIC ACTIVITIES: CPT | Mod: PO

## 2024-06-14 PROCEDURE — 36415 COLL VENOUS BLD VENIPUNCTURE: CPT | Performed by: EMERGENCY MEDICINE

## 2024-06-14 PROCEDURE — 80053 COMPREHEN METABOLIC PANEL: CPT | Performed by: EMERGENCY MEDICINE

## 2024-06-14 PROCEDURE — 85025 COMPLETE CBC W/AUTO DIFF WBC: CPT | Performed by: EMERGENCY MEDICINE

## 2024-06-14 PROCEDURE — 84484 ASSAY OF TROPONIN QUANT: CPT | Performed by: EMERGENCY MEDICINE

## 2024-06-14 PROCEDURE — 25000003 PHARM REV CODE 250: Performed by: EMERGENCY MEDICINE

## 2024-06-14 PROCEDURE — 93005 ELECTROCARDIOGRAM TRACING: CPT

## 2024-06-14 PROCEDURE — 93010 ELECTROCARDIOGRAM REPORT: CPT | Mod: ,,, | Performed by: GENERAL PRACTICE

## 2024-06-14 RX ORDER — PANTOPRAZOLE SODIUM 40 MG/1
40 TABLET, DELAYED RELEASE ORAL DAILY
Qty: 30 TABLET | Refills: 3 | Status: SHIPPED | OUTPATIENT
Start: 2024-06-14 | End: 2025-06-14

## 2024-06-14 RX ORDER — PANTOPRAZOLE SODIUM 40 MG/1
40 TABLET, DELAYED RELEASE ORAL
Status: COMPLETED | OUTPATIENT
Start: 2024-06-14 | End: 2024-06-14

## 2024-06-14 RX ADMIN — PANTOPRAZOLE SODIUM 40 MG: 40 TABLET, DELAYED RELEASE ORAL at 03:06

## 2024-06-14 NOTE — ED NOTES
Lindsay Sneed presents to the ED with c/o stabbing CP, radiating down her shoulder.  Pt states that this has been an issue for several weeks.  Pt was at PT this morning and was instructed to come to the ER. No other complaints at this time. Bed low and locked. Pt connected to BP cuff and pulse ox.  Call light within reach.

## 2024-06-14 NOTE — ED PROVIDER NOTES
Encounter Date: 2024       History     Chief Complaint   Patient presents with    Chest Pain     CP left side, down shoulder into left arm, stabbing pain.       Chief complaint: Chest pain    HPI:  39-year-old female presents with a 2 hour history of nonradiating retrosternal and left-sided chest pain.  She denies any nausea, vomiting, shortness of breath or diaphoresis.  She has no history of coronary artery disease and no family history of same.  She does admit to a history of GERD but does not take medication.  She has no history of DVT or pulmonary emboli and has had no lower extremity swelling.      Review of patient's allergies indicates:   Allergen Reactions    Adhesive     Bactrim [sulfamethoxazole-trimethoprim]      No past medical history on file.  Past Surgical History:   Procedure Laterality Date    BLADDER SURGERY      due to fistula     SECTION      HYSTERECTOMY      TUBAL LIGATION       Family History   Problem Relation Name Age of Onset    Breast cancer Maternal Grandmother      Breast cancer Other Great Aunt      Social History     Tobacco Use    Smoking status: Former     Types: Cigarettes    Smokeless tobacco: Never   Substance Use Topics    Alcohol use: Never    Drug use: Never     Review of Systems   Constitutional:  Negative for fever.   Eyes:  Negative for visual disturbance.   Respiratory:  Negative for apnea and shortness of breath.    Cardiovascular:  Positive for chest pain. Negative for palpitations.   Gastrointestinal:  Negative for abdominal distention, abdominal pain, nausea and vomiting.   Genitourinary:  Negative for difficulty urinating.   Musculoskeletal:  Negative for neck pain.   Skin:  Negative for pallor and rash.   Neurological:  Negative for headaches.   Hematological:  Does not bruise/bleed easily.   Psychiatric/Behavioral:  Negative for agitation.        Physical Exam     Initial Vitals [24 1442]   BP Pulse Resp Temp SpO2   110/80 68 17 99 °F (37.2 °C) 98  %      MAP       --         Physical Exam    Nursing note and vitals reviewed.  Constitutional: She appears well-developed and well-nourished.   HENT:   Head: Normocephalic and atraumatic.   Eyes: Conjunctivae are normal.   Neck: Neck supple.   Normal range of motion.  Cardiovascular:  Normal rate, regular rhythm and normal heart sounds.     Exam reveals no gallop and no friction rub.       No murmur heard.  Pulmonary/Chest: Effort normal and breath sounds normal. No respiratory distress. She has no wheezes. She has no rhonchi. She has no rales.   Abdominal: Abdomen is soft. She exhibits no distension. There is no abdominal tenderness.   Musculoskeletal:         General: Normal range of motion.      Cervical back: Normal range of motion and neck supple.     Neurological: She is alert and oriented to person, place, and time.   Skin: Skin is warm and dry. No erythema.   Psychiatric: She has a normal mood and affect.         ED Course   Procedures  Labs Reviewed   CBC W/ AUTO DIFFERENTIAL - Abnormal; Notable for the following components:       Result Value    MCH 31.5 (*)     Gran % 73.3 (*)     All other components within normal limits   COMPREHENSIVE METABOLIC PANEL   TROPONIN I     EKG Readings: (Independently Interpreted)   Rhythm: Normal Sinus Rhythm. Heart Rate: 72. Ectopy: No Ectopy. Conduction: Normal. ST Segments: Normal ST Segments. T Waves: Normal. Axis: Normal. Clinical Impression: Normal Sinus Rhythm       Imaging Results              X-Ray Chest PA And Lateral (Final result)  Result time 06/14/24 18:21:23      Final result by Michelle Cano MD (06/14/24 18:21:23)                   Impression:      No acute cardiopulmonary disease      Electronically signed by: Michelle Cano MD  Date:    06/14/2024  Time:    18:21               Narrative:    EXAMINATION:  XR CHEST PA AND LATERAL    CLINICAL HISTORY:  Chest pain, unspecified    TECHNIQUE:  PA and lateral views of the chest were  performed.    COMPARISON:  03/21/2023    FINDINGS:  The heart is not enlarged.  The lungs are well expanded and clear.  The costophrenic sulci are sharp.                                       Medications   pantoprazole EC tablet 40 mg (40 mg Oral Given 6/14/24 0132)     Medical Decision Making  39-year-old female presents with a 2 hour history of persistent chest pain.  EKG fails to demonstrate any evidence of ischemia/MI with a negative troponin.  The symptoms are most consistent with GERD.  She has symptomatic improvement with Maalox and Protonix.  She is encouraged to take Protonix daily and is to return for worsening chest pain.  She has no tachycardia or hypoxia with pulmonary embolism unlikely.  She has no fever with no infiltrates seen on chest x-ray with pneumonia unlikely.    Amount and/or Complexity of Data Reviewed  Labs: ordered.  Radiology: ordered.    Risk  Prescription drug management.                                      Clinical Impression:  Final diagnoses:  [R07.9] Chest pain  [K21.9] Gastroesophageal reflux disease, unspecified whether esophagitis present (Primary)          ED Disposition Condition    Discharge Stable          ED Prescriptions       Medication Sig Dispense Start Date End Date Auth. Provider    pantoprazole (PROTONIX) 40 MG tablet Take 1 tablet (40 mg total) by mouth once daily. 30 tablet 6/14/2024 6/14/2025 Willy Hahn III, MD          Follow-up Information       Follow up With Specialties Details Why Contact Info    Elkin Posadas MD Family Medicine In 3 days  [address]  [address] [address]  [phone]               Willy Hahn III, MD  06/15/24 0928

## 2024-06-17 ENCOUNTER — OUTPATIENT CASE MANAGEMENT (OUTPATIENT)
Dept: ADMINISTRATIVE | Facility: OTHER | Age: 39
End: 2024-06-17
Payer: MEDICAID

## 2024-06-18 NOTE — PROGRESS NOTES
Outpatient Care Management   - Care Plan Follow Up    Patient: Lindsay Sneed  MRN:  [id number]  Date of Service:  6/18/2024  Completed by:  Karolina Guzman LCSW  Referral Date: 05/10/2024    Reason for Visit   Patient presents with    OPCM SW Follow Up Call       Brief Summary:  spoke with Pt, she got kicked out of her hotel and is in another one. She has no food and the stuff in the room does not work. Discussed other options and sent low income apartment list. Sent food pantry list and sent email to [facility] as she cannot reach them to request update. She will call the apartments to see if there is an option.   6/18/24: Received message that one of the complexes has an opening and a rent that would work for her. She is working on getting her documents together to obtain the apartment.     Future Appointments   Date Time Provider Department Center   6/19/2024  1:00 PM Azra Rosario CCC-SLP [facility]   6/21/2024 10:15 AM Teresa Dillon OT [facility]   6/25/2024 11:00 AM Kevin Gonzales PT [facility] [place]   6/25/2024  2:30 PM Jessica Singh CCC-SLP [facility]   6/25/2024  4:00 PM Allison Hui OT [facility]   6/27/2024 11:00 AM Vidhi Calvo CCC-SLP [facility]   7/1/2024  9:00 AM Vignesh Burns MD [facility] NE[facility]   7/1/2024 11:00 AM Vidhi Calvo CCCRaSLP [facility]   7/2/2024 11:00 AM Kevin Gonzales PT [facility] [place]   7/8/2024 11:00 AM Vidhi Calvo CCC-SLP [facility]   7/9/2024 11:00 AM Kevin Gonzales PT [facility]   7/10/2024 10:15 AM Teresa Dillon OT [facility]   7/10/2024 11:00 AM Azra Rosario [facility]   7/15/2024 10:15 AM Teresa Dillon OT [facility]   7/15/2024 11:00 AM Vidhi Calvo CCC-SLP [facility]   7/16/2024 11:00 AM Kevin Gonzales, PT [facility]  [facility] [place]   7/18/2024 10:15 AM Vidhi Calvo, CCC-SLP [facility]   7/18/2024 11:00 AM Teresa Dillon, OT [facility]   7/22/2024 10:15 AM Allison Hui, OT [facility]   7/22/2024 11:00 AM Vidhi Calvo, CCC-SLP [facility]   7/23/2024 11:00 AM Kevin Gonzales, PT [facility] [place]   7/25/2024 10:15 AM Allison Hui, OT [facility]   7/25/2024 11:00 AM Vidhi Calvo, CCC-SLP [facility]   7/29/2024 10:15 AM Allison Hui, OT [facility]   7/29/2024 11:00 AM Vidhi Calvo, CCC-SLP [facility]   7/30/2024 11:00 AM Kevin Gonzales, PT [facility] [place]   8/1/2024 10:15 AM Vidhi Calvo, CCC-SLP [facility]   8/1/2024 11:00 AM Allison Hui, OT [facility]   8/5/2024 10:15 AM Allison Hui, OT [facility]   8/5/2024 11:00 AM Vidhi Calvo, CCC-SLP [facility]   8/8/2024 10:15 AM Vidhi Calvo, CCC-SLP [facility]   8/8/2024 11:00 AM Teresa Dillon, OT [facility]   8/12/2024 10:15 AM Teresa Dillon, OT [facility]   8/12/2024 11:00 AM Vidhi Calvo, CCC-SLP [facility]   8/15/2024 10:15 AM Vidhi Calvo, CCC-SLP [facility]   8/15/2024 11:00 AM Teresa Dillon OT [facility]   8/19/2024 10:15 AM Vidhi Calvo CCC-SLP [facility]   8/19/2024 11:00 AM Allison Hui OT [facility]   8/22/2024 10:15 AM Allison Hui OT [facility]   8/22/2024 11:00 AM Vidhi Calvo CCC-SLP [facility]   8/26/2024 10:15 AM Allison Hui OT [facility]   8/26/2024 11:00 AM Vidhi Calvo CCC-SLP [facility]     Karolina Guzman [facility]  Neuro Therapy   [facility]  [phone]

## 2024-06-19 ENCOUNTER — CLINICAL SUPPORT (OUTPATIENT)
Dept: REHABILITATION | Facility: HOSPITAL | Age: 39
End: 2024-06-19
Payer: MEDICAID

## 2024-06-19 DIAGNOSIS — R41.841 COGNITIVE COMMUNICATION DEFICIT: Primary | ICD-10-CM

## 2024-06-19 PROCEDURE — 92507 TX SP LANG VOICE COMM INDIV: CPT | Mod: PO

## 2024-06-19 NOTE — PROGRESS NOTES
[facility]  Speech Therapy Treatment Note- Neurological Rehabilitation  Date: 6/19/2024     Name: Lindsay Sneed   MRN: [id number]   Therapy Diagnosis:   Encounter Diagnosis   Name Primary?    Cognitive communication deficit Yes     Physician: Vignesh Burns MD  Physician Orders: Ambulatory Referral to Speech Therapy   Medical Diagnosis: Post concussion syndrome [F07.81]     Visit #/ Visits Authorized: 2/ 24  Date of Evaluation:  6/4/2024  Insurance Authorization Period: 6/3/2024 - 8/10/2024  Plan of Care Expiration Date:    due 8/27/2024  Extended Plan of Care:  n/a   Progress Note: due 8/13/2024     Time In:  1257  Time Out:  1348  Total Billable Time: 51 minutes    Precautions: Standard  Subjective:   Patient reports: severe headache at start of session and reported she almost canceled session    She was compliant to home exercise program.   Response to previous treatment: fair  Pain Scale: 8/10 on a Visual Analog Scale currently.  Pain Location: headache  Objective:   UNTIMED  Procedure Min.   Speech Language Voice Therapy        Short Term Goals: (10 weeks) Current Progress:   1. Patient will complete selective attention tasks (auditory or visual) with 90% accuracy independently increase selective attention.     Progressing/ Not Met 6/19/2024    -Not addressed this session.    2.  Patient will sustain attention to complete moderate to complex reasoning tasks for 2 minutes with one request for clarification to increase sustained attention.     Progressing/ Not Met 6/19/2024    -Not addressed this session.    3.  Patient will use attention shifting strategies to shift attention between two tasks with no more than 3 cues or 90% accuracy to improve alternating attention.      Progressing/ Not Met 6/19/2024    -Not addressed this session.    4. Patient will use Goal Plan Action Review strategy to complete moderate to complex reasoning, planning, or organization tasks with 90% accuracy independently  to improve functional executive function skills.     Progressing/ Not Met 6/19/2024    See education below    5. Patient will complete short term recall tasks after a 5 minutes delay with 90% accuracy  independently  with use of memory strategies to improve recall of information and generalization of memory strategies.     Progressing/ Not Met 6/19/2024    -See education below   6.  Patient will complete mental manipulation tasks with 90% acc to improve working memory.     Progressing/ Not Met 6/19/2024    -Not addressed this session.    7. Patient will demonstrate awareness of cognitive-communication difficulties in 1-2 situations in her day in which cognitive deficits could or did compromise efficiency and performance with minimal cueing.      Progressing/ Not Met 6/19/2024    -See education below   8. Patient will independently implement cognitive/sensory rest periods throughout day after identifying cognitive fatigue including limiting sensory input (sound, light, etc.)      Progressing/ Not Met 6/19/2024    -See education below   9. Patient will identify two solutions to functional daily problems with thought flexibility and minimal assistance for use of strategies.       Progressing/ Not Met 6/19/2024    -See education below   10. Patient will independently generate strategies to improve ability to complete tasks with enhanced accuracy and time, based on review of objective previous performance on trials of functional tasks with 80% accuracy.      Progressing/ Not Met 6/19/2024    -Not addressed this session.    11. Patient will complete a task to improve attention and memory (I.e. sample bill paying activity, recipe, or multiple choice comprehension questions to 1 paragraphs) with 80% accuracy and natural environment noise distractions (TV news background, music, etc.).     Progressing/ Not Met 6/19/2024    -Not addressed this session.      Patient Education/Response:   Patient educated regarding the  "followin. Speech therapy role/POC  2. Continued importance of communication with case management (Karolina Guzman) to assist in patient socioecomomic needs  3. Introduction to cognitive buckets  4. Role of adequate nutrition in overall cognitive functioning  5. Impact of external stressors on ability to sustain attention to tasks which ultimately affects memory     Home program established: Patient instructed to continue prior program  Patient verbalized understanding to all above education provided.     See Electronic Medical Record under Patient Instructions for exercises provided throughout therapy.  Assessment:   Patient in good spirits across session. Majority of session today in education and structured conversation regarding speech therapy role/Plan of Care, impact and handling high stress with ongoing post-concussion symptoms, and review of cognitive buckets. Speech Language Pathologist encouraged patient to continue communication with social work in effort to alleviate stress that is adding to patient's cognitive load - patient in agreement and voiced understanding. Discussed how progress in Speech Therapy will be limited if patient's external stressors continue to fully occupy patient's cognitive buckets and the role of external stressors on ability to maintain attention. Discussed importance of nutrition in overall cognitive functioning, as patient reported she "barely eats" due to nausea. Discussed that nausea can be a side effect of medications if they are not taken with food. Plan to address use of scheduling/appointment book to improve structure of day, recall, time management, and minimalize needs/wants to reduce cognitive load and reduce negative symptoms (I.e. headache, nausea, sensitivity to light) since concussion.     Lindsay is progressing well towards her goals. Current goals remain appropriate. Goals to be updated as necessary.     Patient prognosis is Good. Patient will continue to " benefit from skilled outpatient speech and language therapy to address the deficits listed in the problem list on initial evaluation, provide patient/family education and to maximize patient's level of independence in the home and community environment.   Medical necessity is demonstrated by the following IMPAIRMENTS:  Cognition: Deficits in executive functioning, attention, and memory prevent the pt from relaying medically and safety relevant information in a timely manner in a state of emergency.   Barriers to Therapy: none  Patient's spiritual, cultural and educational needs considered and patient agreeable to plan of care and goals.  Plan:   Continue Plan of Care with focus on rehabilitation and compensation for cognitive-linguistic functioning s/p concussion in December 2023    Azra Rosario CCC-SLP   6/19/2024

## 2024-06-20 LAB
OHS QRS DURATION: 86 MS
OHS QTC CALCULATION: 431 MS

## 2024-06-21 ENCOUNTER — OUTPATIENT CASE MANAGEMENT (OUTPATIENT)
Dept: ADMINISTRATIVE | Facility: OTHER | Age: 39
End: 2024-06-21
Payer: MEDICAID

## 2024-06-21 NOTE — PROGRESS NOTES
"[facility]  Occupational Therapy Treatment Note     Date: 6/14/2024  Name: Lindsay Sneed  Clinic Number: [id number]    Therapy Diagnosis:   Encounter Diagnoses   Name Primary?    Pain Yes    Impaired vision     Daily nausea     Nonspecific dizziness        Physician: Vignesh Burns MD    Physician Orders: Eval and Treat  Medical Diagnosis: G44.59 (ICD-10-CM) - Other complicated headache syndrome   Evaluation Date: 5/27/2024  Insurance Authorization Period Expiration: 12/31/2024  Progress Note(s) Due: 6/28/24, 7/26/24  Plan of Care Certification Period: 8/23/2024  Date of Return to MD: TBD  Visit # / Visits authorized: 4 / 12 + eval   FOTO: 1/ 3, last assessed on 5/29/2024       Precautions:  Standard, Fall, and seizure     Time In: 1230  Time Out: 1350  Total Billable Time:  80 minutes    Subjective     Patient reports: "Not good, I'm homeless." She reported being evicted from her hotel, but expressed confusion about how she isn't up to date financially because she has been making weekly payments. She's already called Karolina.    She was not compliant with home exercise program given last session. (Daily use of guided relaxation)    Response to previous treatment: no concerns from kinesiotape     Functional change: no changes per pt report    Pain:   Headache=9  Nausea=9  Neck pain=9  Location:  shoulders + back     Objective     Objective Measures updated at progress report unless specified.    At 1237, KV=359/75, HR=74     Treatment     Lindsay received the treatments listed below:      therapeutic activities to improve functional performance for 37 minutes, including:    See vitals above     Lindsay was given time to express her frustrations & concerns, allowing time for processing her current situation. She brought a printout of her statement from the hotel and noted another payment she had made that was not reflected on her statement. OT made a copy and calculated her charges and " payments. While OT was completing this, patient completed a 15 minute progressive muscle relaxation. After progressive muscle relaxation, OT [facility] shelters and one in particular. Lindsay called the recommended contact and asked appropriate questions about continuation of care with her current medical team, where her belongings would be stored, how she would get there, etc. OT explained my calculations on her bill and that she owes a considerable amount of money, even if there is an unrecorded payment. She was given additional time for processing. As OT was walking patient out of session, she complained of chest pain. As she had no other symptoms and her vitals had been acceptable, OT encouraged her to continue using progressive muscle relaxation to manage stress, but if she really feels like something is wrong, she needs to go to the ER. She then stopped and spoke with someone at the .    Patient Education and Home Exercises     Education provided:   - Progress towards goals     Written Home Exercises Provided: not given today.      Assessment     Lindsay appeared to have a plan for where to live as she left her OT session, saying she would ask a friend to hold her belongings and then go to the shelter. Her current situation is very complicated and overwhelming. However, on the phone, she asked all appropriate questions to help her understand the progression of the situation should she go to the shelter. Will continue therapy as able pending patient's living location and ability to come to therapy in [place].     Lindsay is progressing slowly towards her goals and there are no updates to goals at this time. Pt prognosis is Fair.     Patient will continue to benefit from skilled outpatient occupational therapy to address the deficits listed in the problem list on initial evaluation provide patient/family education and to maximize patient's level of  independence in the home and community environment.     Patient's spiritual, cultural and educational needs considered and patient agreeable to plan of care and goals.    Anticipated barriers to occupational therapy: symptoms from injury     Goals:    Short Term Goals (6 weeks) Status When Last Assessed  Progressing/ Met   1) Pt to be (I) with initial HEP    (progressing 6/14/2024)     2) Pt to be able to implement progressive relaxation / mindfulness techniques into daily routine (I).    (progressing 6/14/2024)     3) Vision to be further assessed and goal added as necessary    (progressing 6/14/2024)           LongTerm Goals (12 weeks) Status When Last Assessed  Progressing/ Met   1) Pt to be able to take eye breaks as appropriate when using technology    (progressing 6/14/2024)     2) Pt to be able to participate in social media influencing task for 15 minutes  with compensatory strategy as needed with no significant increase in symptoms    (progressing 6/14/2024)     3) Pt headache to reduce to 3/10 consistently over 5/7 days.  6-8/10  (progressing 6/14/2024)     4) Pt to report implementation of compensatory strategies to enable getting through daily routine without having to get into bed to reduce symptoms    (progressing 6/14/2024)        Additional functional goals to be added as pt progresses and per her priorities.    Plan   Certification Period/Plan of care expiration: 5/27/2024 to 8/23/2024.     Outpatient Occupational Therapy 2 times weekly for 12 weeks to include the following interventions: Manual Therapy, Neuromuscular Re-ed, Patient Education, Self Care, Therapeutic Activities, and Therapeutic Exercise.     Updates/Grading for next session: visual assessment; JOHN Dillon OT   6/14/2024

## 2024-06-21 NOTE — PROGRESS NOTES
Outpatient Care Management   - Care Plan Follow Up    Patient: Lindsay Sneed  MRN:  [id number]  Date of Service:  6/21/2024  Completed by:  Karolina Guzman LCSW  Referral Date: 05/10/2024    Reason for Visit   Patient presents with    OPCM SW Follow Up Call       Brief Summary:  spoke with Pt who reported she did find an apartment that would be affordable potentially but she needs her birth certificate and pay stubs. Her friend has been helping bring her places and will bring her Monday to vital records. She cannot reach her adjustor. The  is not helping her anymore either. Discussed her asking for another adjustor. She also reported she's spoken with an . Also discussed the stress this is putting on her and being able to recovery while dealing with that. She is interested in therapy, but worried about workers comp covering it and transport. Discussed that it might be worth using her Medicaid and their transport unless someone is able to see her virtually. She has some experience with [facility]. Contacted them and requested they call her for an appt. Sent Pt info info about using Medicaid transport.     Future Appointments   Date Time Provider Department Center   6/25/2024 11:00 AM Kevin Gonzales, NIKKIE [facility] REHOP [place]   6/25/2024  2:30 PM Jessica Singh CCC-SLP [facility] REHOP [place] [facility]   6/25/2024  4:00 PM Allison Hui OT [facility] RE[facility]OP [place] [facility]   6/27/2024 11:00 AM Vidhi Calvo CCC-SLP [facility] RE[facility]OP [place] [facility]   7/1/2024  9:00 AM Vignesh Burns MD [facility] NEURO [facility]   7/1/2024 11:00 AM Vidhi Calvo CCC-SLP [facility] REHOP [place]   7/2/2024 11:00 AM Kevin Gonzales PT [facility] REHOP [place]   7/8/2024 11:00 AM Vidhi Calvo CCC-SLP [facility] REHOP [place] [facility]   7/9/2024 11:00 AM Kevin Gonzales PT [facility] REHOP [place]   7/10/2024 10:15 AM Teresa Dillon OT [facility] REHOP [place] [facility]   7/10/2024 11:00 AM Azra Rosario,  CCC-SLP [facility] [place] [facility]   7/15/2024 10:15 AM Teresa Dillon, OT [facility] [place] [facility]   7/15/2024 11:00 AM Vidhi Calvo, CCC-SLP [facility] [place] [facility]   7/16/2024 11:00 AM Kevin Gonzales, PT [facility] [place]   7/18/2024 10:15 AM Vidhi Calvo, CCC-SLP [facility] [place] [facility]   7/18/2024 11:00 AM Teresa Dillon, OT [facility] [place] [facility]   7/22/2024 10:15 AM Allison Hui, OT [facility] [place] [facility]   7/22/2024 11:00 AM Vidhi Calvo, CCC-SLP [facility] [place] [facility]   7/23/2024 11:00 AM Kevin Gonzales, PT [facility] [place]   7/25/2024 10:15 AM Allison Hui, OT [facility] [place] [facility]   7/25/2024 11:00 AM Vidhi Calvo, CCC-SLP [facility] [place] [facility]   7/29/2024 10:15 AM Allison Hui, OT [facility] [place] [facility]   7/29/2024 11:00 AM Vidhi Calvo, CCC-SLP [facility] [place] [facility]   7/30/2024 11:00 AM Kevin Gonzales, PT [facility] [place]   8/1/2024 10:15 AM Vidhi Calvo, CCC-SLP [facility] [place] [facility]   8/1/2024 11:00 AM Allison Hui, OT [facility] [place] [facility]   8/5/2024 10:15 AM Allison Hui, OT [facility] [place] [facility]   8/5/2024 11:00 AM Vidhi Calvo, CCC-SLP [facility] [place] [facility]   8/8/2024 10:15 AM Vidhi Calvo, CCC-SLP [facility] [place] [facility]   8/8/2024 11:00 AM Teresa Dillon, OT [facility] [place] [facility]   8/12/2024 10:15 AM Teresa Dillon OT [facility] [place] [facility]   8/12/2024 11:00 AM Vidhi Calvo CCC-SLP [facility] [place] [facility]   8/15/2024 10:15 AM Vidhi Calvo CCC-SLP [facility] [place] [facility]   8/15/2024 11:00 AM Teresa Dillon OT [facility] [place] [facility]   8/19/2024 10:15 AM Vidhi Calvo CCC-SLP [facility] [place] [facility]   8/19/2024 11:00 AM Allison Hui OT [facility] [place] [facility]   8/22/2024 10:15 AM Allison Hui OT [facility] [place] [facility]   8/22/2024 11:00 AM Vidhi Calov CCC-SLP [facility] [place] [facility]   8/26/2024 10:15 AM Allison Hui OT [facility] [place] [facility]    8/26/2024 11:00 AM Vidhi Calvo, CCC-SLP [facility] RE[facility] [place] [facility]     Karolina Guzman LCSW  Neuro Therapy   [facility]  [phone]

## 2024-06-25 ENCOUNTER — CLINICAL SUPPORT (OUTPATIENT)
Dept: REHABILITATION | Facility: HOSPITAL | Age: 39
End: 2024-06-25
Payer: MEDICAID

## 2024-06-25 DIAGNOSIS — F07.81 POST CONCUSSION SYNDROME: ICD-10-CM

## 2024-06-25 DIAGNOSIS — H54.7 IMPAIRED VISION: ICD-10-CM

## 2024-06-25 DIAGNOSIS — R42 NONSPECIFIC DIZZINESS: ICD-10-CM

## 2024-06-25 DIAGNOSIS — R41.841 COGNITIVE COMMUNICATION DEFICIT: Primary | ICD-10-CM

## 2024-06-25 DIAGNOSIS — R11.0 DAILY NAUSEA: ICD-10-CM

## 2024-06-25 DIAGNOSIS — R52 PAIN: Primary | ICD-10-CM

## 2024-06-25 PROCEDURE — 97530 THERAPEUTIC ACTIVITIES: CPT | Mod: PO

## 2024-06-25 PROCEDURE — 92507 TX SP LANG VOICE COMM INDIV: CPT | Mod: PO

## 2024-06-25 NOTE — PROGRESS NOTES
[facility]  Speech Therapy Treatment Note- Neurological Rehabilitation  Date: 6/25/2024     Name: Lindsay Sneed   MRN: [id number]   Therapy Diagnosis:   Encounter Diagnosis   Name Primary?    Cognitive communication deficit Yes       Physician: Vignesh Burns MD  Physician Orders: Ambulatory Referral to Speech Therapy   Medical Diagnosis: Post concussion syndrome [F07.81]     Visit #/ Visits Authorized: 3/ 24  Date of Evaluation:  6/4/2024  Insurance Authorization Period: 6/3/2024 - 8/10/2024  Plan of Care Expiration Date:    due 8/27/2024  Extended Plan of Care:  n/a   Progress Note: due 8/13/2024     Time In:  1430  Time Out:  1515  Total Billable Time: 45 minutes    Precautions: Standard  Subjective:   Patient reports: ongoing paperwork to complete long term disability being completed.     She was compliant to home exercise program.   Response to previous treatment: fair  Pain Scale: 7 /10 on a Visual Analog Scale currently.  Pain Location: headache  Objective:   UNTIMED  Procedure Min.   Speech Language Voice Therapy 45       Short Term Goals: (10 weeks) Current Progress:   1. Patient will complete selective attention tasks (auditory or visual) with 90% accuracy independently increase selective attention.     Progressing/ Not Met 6/25/2024    -Not addressed this session.    2.  Patient will sustain attention to complete moderate to complex reasoning tasks for 2 minutes with one request for clarification to increase sustained attention.     Progressing/ Not Met 6/25/2024    -Not addressed this session.    3.  Patient will use attention shifting strategies to shift attention between two tasks with no more than 3 cues or 90% accuracy to improve alternating attention.      Progressing/ Not Met 6/25/2024    See education below    4. Patient will use Goal Plan Action Review strategy to complete moderate to complex reasoning, planning, or organization tasks with 90% accuracy independently to improve  functional executive function skills.     Progressing/ Not Met 6/25/2024    Patient engaged in structured conversation with Speech Language Pathologist today regarding GPAR in regards to managing stress, improving use of cognitive strategies s/p concussion (I.e. minimalization), and managing cognitive fatigue. Patient needed moderate verbal cues across conversation today 2' tangential to improve sustained attention    5. Patient will complete short term recall tasks after a 5 minutes delay with 90% accuracy  independently  with use of memory strategies to improve recall of information and generalization of memory strategies.     Progressing/ Not Met 6/25/2024    -See education below   6.  Patient will complete mental manipulation tasks with 90% acc to improve working memory.     Progressing/ Not Met 6/25/2024    -Not addressed this session.    7. Patient will demonstrate awareness of cognitive-communication difficulties in 1-2 situations in her day in which cognitive deficits could or did compromise efficiency and performance with minimal cueing.      Progressing/ Not Met 6/25/2024    -See education below   8. Patient will independently implement cognitive/sensory rest periods throughout day after identifying cognitive fatigue including limiting sensory input (sound, light, etc.)      Progressing/ Not Met 6/25/2024    -See education below   9. Patient will identify two solutions to functional daily problems with thought flexibility and minimal assistance for use of strategies.       Progressing/ Not Met 6/25/2024    -See education below   10. Patient will independently generate strategies to improve ability to complete tasks with enhanced accuracy and time, based on review of objective previous performance on trials of functional tasks with 80% accuracy.      Progressing/ Not Met 6/25/2024    -Not addressed this session.    11. Patient will complete a task to improve attention and memory (I.e. sample bill paying  "activity, recipe, or multiple choice comprehension questions to 1 paragraphs) with 80% accuracy and natural environment noise distractions (TV news background, music, etc.).     Progressing/ Not Met 2024    -Not addressed this session.      Patient Education/Response:   Patient educated regarding the followin. Speech therapy role/POC  2. Continued importance of communication with case management (Karolina Guzman) to assist in patient socioecomomic needs  3. Continued education related to cognitive buckets in effort to reduce cognitive fatigue  4. Role of adequate nutrition in overall cognitive functioning  5. Impact of external stressors on ability to sustain attention to tasks which ultimately affects memory     Home program established: Patient instructed to continue prior program  Patient verbalized understanding to all above education provided.     See Electronic Medical Record under Patient Instructions for exercises provided throughout therapy.  Assessment:   Patient in good spirits across session. Majority of session today in education and structured conversation regarding speech therapy role/Plan of Care, impact and handling high stress with ongoing post-concussion symptoms, and review of cognitive buckets. Speech Language Pathologist encouraged patient to continue communication with social work in effort to alleviate stress that is adding to patient's cognitive load - patient in agreement and voiced understanding. Discussed how progress in Speech Therapy will be limited if patient's external stressors continue to fully occupy patient's cognitive buckets and the role of external stressors on ability to maintain attention. Discussed importance of nutrition in overall cognitive functioning, as patient reported she "barely eats" due to nausea. Discussed that nausea can be a side effect of medications if they are not taken with food. Plan to address use of scheduling/appointment book to improve " structure of day, recall, time management, and minimalize needs/wants to reduce cognitive load and reduce negative symptoms (I.e. headache, nausea, sensitivity to light) since concussion.     Lindsay is progressing well towards her goals. Current goals remain appropriate. Goals to be updated as necessary.     Patient prognosis is Good. Patient will continue to benefit from skilled outpatient speech and language therapy to address the deficits listed in the problem list on initial evaluation, provide patient/family education and to maximize patient's level of independence in the home and community environment.   Medical necessity is demonstrated by the following IMPAIRMENTS:  Cognition: Deficits in executive functioning, attention, and memory prevent the pt from relaying medically and safety relevant information in a timely manner in a state of emergency.   Barriers to Therapy: none  Patient's spiritual, cultural and educational needs considered and patient agreeable to plan of care and goals.  Plan:   Continue Plan of Care with focus on rehabilitation and compensation for cognitive-linguistic functioning s/p concussion in December 2023    Jessica Singh CCC-SLP   6/25/2024

## 2024-06-25 NOTE — PROGRESS NOTES
"[facility]  Occupational Therapy Treatment Note     Date: 6/25/2024  Name: Lindsay Sneed  Clinic Number: [id number]    Therapy Diagnosis:   No diagnosis found.      Physician: Vignesh Burns MD    Physician Orders: Eval and Treat  Medical Diagnosis: G44.59 (ICD-10-CM) - Other complicated headache syndrome   Evaluation Date: 5/27/2024  Insurance Authorization Period Expiration: 12/31/2024  Progress Note(s) Due: 6/28/24, 7/26/24  Plan of Care Certification Period: 8/23/2024  Date of Return to MD: TBD  Visit # / Visits authorized: 5 / 12 + eval   FOTO: 1/ 3, last assessed on 5/29/2024       Precautions:  Standard, Fall, and seizure     Time In: 4:07 PM   Time Out: 4:45 PM   Total Billable Time:  38 minutes    Subjective     Patient reports: "Nothing new."   She was not compliant with home exercise program given last session. (Daily use of guided relaxation)    Response to previous treatment: no concerns from kinesiotape   Functional change: no changes per pt report    Symptoms at the beginning of the session.   Headache=7/10  Nausea=7/10  Pain 7/10 - neck, shoulders     Symptoms at the end of the session.   Headache=6/10  Nausea=7/10   Pain 7-8/10 - neck, shoulders     Objective     Objective Measures updated at progress report unless specified.    At 1237, BP= 97/68, HR=91    Treatment     Lindsay received the treatments listed below:      therapeutic activities to improve functional performance for 38 minutes, including:    See vitals above     Pt taught and practiced 5-4-3-2-1 Grounding exercise. Pt given handout for home and community use. She v/u. Pt reported an right sided, 8/10 headache while reading the handout.      KT to inhibit bilateral upper traps (Y-cut, applied from to insertion to origin, ~15% tension in therapeutic range and no tension in 1" tails).    KT to facilitate bilateral thoracic/ cervical spinal extensors (Y-cut, applied from origin to insertion, ~35% tension in " "therapeutic range and no tension in 1" tails).     Patient Education and Home Exercises     Education provided:   - Progress towards goals     Written Home Exercises Provided: not given today.      Assessment     Lindsay appeared to have a plan for where to live as she left her OT session, saying she would ask a friend to hold her belongings and then go to the shelter. Her current situation is very complicated and overwhelming. However, on the phone, she asked all appropriate questions to help her understand the progression of the situation should she go to the shelter. Will continue therapy as able pending patient's living location and ability to come to therapy in [place].     Lindsay is progressing slowly towards her goals and there are no updates to goals at this time. Pt prognosis is Fair.     Patient will continue to benefit from skilled outpatient occupational therapy to address the deficits listed in the problem list on initial evaluation provide patient/family education and to maximize patient's level of independence in the home and community environment.     Patient's spiritual, cultural and educational needs considered and patient agreeable to plan of care and goals.    Anticipated barriers to occupational therapy: symptoms from injury     Goals:    Short Term Goals (6 weeks) Status When Last Assessed  Progressing/ Met   1) Pt to be (I) with initial HEP    (progressing 6/25/2024)     2) Pt to be able to implement progressive relaxation / mindfulness techniques into daily routine (I).    (progressing 6/25/2024)     3) Vision to be further assessed and goal added as necessary    (progressing 6/25/2024)           LongTerm Goals (12 weeks) Status When Last Assessed  Progressing/ Met   1) Pt to be able to take eye breaks as appropriate when using technology    (progressing 6/25/2024)     2) Pt to be able to participate in social media influencing task for 15 minutes  with compensatory strategy as needed with no " significant increase in symptoms    (progressing 6/25/2024)     3) Pt headache to reduce to 3/10 consistently over 5/7 days.  6-8/10  (progressing 6/25/2024)     4) Pt to report implementation of compensatory strategies to enable getting through daily routine without having to get into bed to reduce symptoms    (progressing 6/25/2024)        Additional functional goals to be added as pt progresses and per her priorities.    Plan   Certification Period/Plan of care expiration: 5/27/2024 to 8/23/2024.     Outpatient Occupational Therapy 2 times weekly for 12 weeks to include the following interventions: Manual Therapy, Neuromuscular Re-ed, Patient Education, Self Care, Therapeutic Activities, and Therapeutic Exercise.     Updates/Grading for next session: visual assessment; JOHN Hui OT   6/25/2024

## 2024-06-25 NOTE — PATIENT INSTRUCTIONS
5-4-3-2-1 Grounding Exercise  [email or web address]    Anxiety is something most of us have experienced at least once in our life. Public speaking, performance reviews, and new job responsibilities are just some of the work-related situations that can cause even the calmest person to feel a little stressed. This five-step exercise can be very helpful during periods of anxiety or panic by helping to ground you in the present when your mind is bouncing around between various anxious thoughts.    Before starting this exercise, pay attention to your breathing. Slow, deep, long breaths can help you maintain a sense of calm or help you return to a calmer state. Once you find your breath, go through the following steps to help ground yourself:     5: Acknowledge FIVE things you see around you. It could be a pen, a spot on the ceiling, anything in your surroundings.    4: Acknowledge FOUR things you can touch around you. It could be your hair, a pillow, or the ground under your feet.     3: Acknowledge THREE things you hear. This could be any external sound. If you can hear your belly rumbling that counts! Focus on things you can hear outside of your body.    2: Acknowledge TWO things you can smell. Maybe you are in your office and smell pencil, or maybe you are in your bedroom and smell a pillow. If you need to take a brief walk to find a scent you could smell soap in your bathroom, or nature outside.    1: Acknowledge ONE thing you can taste. What does the inside of your mouth taste like--gum, coffee, or the sandwich from lunch?

## 2024-06-26 NOTE — PROGRESS NOTES
[facility]  Occupational Therapy Treatment Note     Date: 6/25/2024  Name: Lindsay Sneed  Clinic Number: [id number]    Therapy Diagnosis:   Encounter Diagnoses   Name Primary?    Pain Yes    Impaired vision     Daily nausea     Nonspecific dizziness      Physician: Vignesh Burns MD    Physician Orders: Eval and Treat  Medical Diagnosis: G44.59 (ICD-10-CM) - Other complicated headache syndrome   Evaluation Date: 5/27/2024  Insurance Authorization Period Expiration: 12/31/2024  Progress Note(s) Due: 6/28/24, 7/26/24  Plan of Care Certification Period: 8/23/2024  Date of Return to MD: TBD  Visit # / Visits authorized: 5 / 12 + eval   FOTO: 1/ 3, last assessed on 5/29/2024       Precautions:  Standard, Fall, and seizure     Time In: 4:07 PM   Time Out: 4:52 PM   Total Billable Time: 45 minutes    Subjective     Patient reports: Pt continues to have a lot going on. She reports she would like more tape today as she feels like it is helping. She reports she could not come on Friday (to her therapy appointment) due to extreme nausea. She reports she is fatigued today.     She was not compliant with home exercise program given last session. (Daily use of guided relaxation)    Response to previous treatment: no concerns from kinesiotape   Functional change: no changes per pt report    Symptoms at the beginning of the session.   Headache=7/10  Nausea=7/10  Pain 7/10 - neck, shoulders     Symptoms at the end of the session.   Headache=6/10  Nausea=7/10   Pain 7-8/10 - neck, shoulders     Objective     Objective Measures updated at progress report unless specified.    At 4:10 PM  BP= 97/68, HR=91    Treatment     Lindsay received the treatments listed below:      therapeutic activities to improve functional performance for 45 minutes, including:    See vitals above     Pt taught and practiced 5-4-3-2-1 Grounding exercise .Pt to be able to complete this when not able to lay down to complete guided  "meditation or progressive muscle relaxation. Pt given handout for home and community use. She v/u. Pt reported an right sided, 8/10 headache while reading the handout.      KT to inhibit bilateral upper traps (Y-cut, applied from to insertion to origin, ~15% tension in therapeutic range and no tension in 1" tails).    KT to facilitate bilateral thoracic/ cervical spinal extensors (Y-cut, applied from origin to insertion, ~35% tension in therapeutic range and no tension in 1" tails).     KT to support lower back muscles to address residual pain (3 - I cut, ~20% tension in therapeutic range and no tension in 1" tails).     Patient Education and Home Exercises     Education provided:   - Progress towards goals     Written Home Exercises Provided: not given today.      Assessment     Lindsay participated well today. She is still having a lot of stress given her current situation. Session spent teaching and completing 5-4-3-2-1 grounding exercise and Kinesiotaping based on where she feels the most tension and pain.     Lindsay is progressing slowly towards her goals and there are no updates to goals at this time. Pt prognosis is Fair.     Patient will continue to benefit from skilled outpatient occupational therapy to address the deficits listed in the problem list on initial evaluation provide patient/family education and to maximize patient's level of independence in the home and community environment.     Patient's spiritual, cultural and educational needs considered and patient agreeable to plan of care and goals.    Anticipated barriers to occupational therapy: symptoms from injury     Goals:    Short Term Goals (6 weeks) Status When Last Assessed  Progressing/ Met   1) Pt to be (I) with initial HEP    (progressing 6/25/2024)     2) Pt to be able to implement progressive relaxation / mindfulness techniques into daily routine (I).    (progressing 6/25/2024)     3) Vision to be further assessed and goal added as " necessary    (progressing 6/25/2024)           LongTerm Goals (12 weeks) Status When Last Assessed  Progressing/ Met   1) Pt to be able to take eye breaks as appropriate when using technology    (progressing 6/25/2024)     2) Pt to be able to participate in social media influencing task for 15 minutes  with compensatory strategy as needed with no significant increase in symptoms    (progressing 6/25/2024)     3) Pt headache to reduce to 3/10 consistently over 5/7 days.  6-8/10  (progressing 6/25/2024)     4) Pt to report implementation of compensatory strategies to enable getting through daily routine without having to get into bed to reduce symptoms    (progressing 6/25/2024)        Additional functional goals to be added as pt progresses and per her priorities.    Plan   Certification Period/Plan of care expiration: 5/27/2024 to 8/23/2024.     Outpatient Occupational Therapy 2 times weekly for 12 weeks to include the following interventions: Manual Therapy, Neuromuscular Re-ed, Patient Education, Self Care, Therapeutic Activities, and Therapeutic Exercise.     Updates/Grading for next session: visual assessment; KT; muscle relaxation; re ed [id number]    Allison Hui OT   6/25/2024

## 2024-06-28 ENCOUNTER — OUTPATIENT CASE MANAGEMENT (OUTPATIENT)
Dept: ADMINISTRATIVE | Facility: OTHER | Age: 39
End: 2024-06-28
Payer: MEDICAID

## 2024-06-28 ENCOUNTER — CLINICAL SUPPORT (OUTPATIENT)
Dept: REHABILITATION | Facility: HOSPITAL | Age: 39
End: 2024-06-28
Payer: MEDICAID

## 2024-06-28 DIAGNOSIS — R42 DYSEQUILIBRIUM: ICD-10-CM

## 2024-06-28 DIAGNOSIS — R42 DIZZINESS: ICD-10-CM

## 2024-06-28 DIAGNOSIS — M54.2 MUSCULOSKELETAL NECK PAIN: Primary | ICD-10-CM

## 2024-06-28 PROCEDURE — 97110 THERAPEUTIC EXERCISES: CPT | Mod: PN

## 2024-06-28 NOTE — PLAN OF CARE
[facility]   Physical Therapy Treatment Note     Name: Lindsay Sneed  Clinic Number: [id number]    Therapy Diagnosis:   Encounter Diagnoses   Name Primary?    Musculoskeletal neck pain Yes    Dizziness     Dysequilibrium      Physician: Vignesh Burns MD    Visit Date: 6/28/2024    Physician Orders: PT Eval and Treat   Medical Diagnosis from Referral: G44.59 (ICD-10-CM) - Other complicated headache syndrome   Evaluation Date: 5/9/2024  Authorization Period Expiration: 3/25/2024  Plan of Care Expiration: 6/21/2024  Progress Note Due: 6/10/2024  Visit # / Visits authorized: 3/12  FOTO: 0/ 3 (FOTO not completed this date)      Precautions: Standard    PTA Visit #: 0/5     FOTO first follow up:   FOTO second follow up:     Time In: 1038  Time Out: 1118  Total Billable Time: 40 minutes    SUBJECTIVE     Pt reports: she really doesn't see much improvement in her symptoms. Continues with headaches, dizziness, and neck pain. Working with OT and Speech. She feels that the stress of her living arrangement is limiting her ability to improve her symptoms.     She was compliant with home exercise program.  Response to previous treatment: as above   Functional change: as above     Pain: 5/10  Location: bilateral neck      OBJECTIVE     Cervical Range of Motion:     Degrees Pain Normal   Flexion 40 Yes CT junction down to T-spine    80-90 deg   Extension 30 Yes mid cervical at hinge point C4-5    normal ROM: 70-80 deg   Right Rotation 70 Yes Left upper trap     70-90 degrees   Left Rotation 65 Yes Right upper trap     70-90 degrees   Right Side Bending 30 Yes Left upper trap  20-45 degrees   Left Side Bending 30 Yes Left upper trap  20-4       Treatment   PT extender available to assist with treatment as needed     Lindsay received the treatments listed below:      therapeutic exercises to develop strength, endurance, and ROM for 40 minutes including:    UBE 4 mins fwd/4mins bwd   Assessment as  above  Seated Thoracic Extension x20 with 3s hold   Open Books x20 each side arm across chest   Wall Slides 3 x 10   No money red theraband 3x10      manual therapy techniques: Joint mobilizations were applied to the: CT junction and thoracic spine for 0 minutes, including:    Soft Tissue Mobilization to Cervical Paraspinals and Upper Trap   Cervical Side Glides Grade I Bilaterally     Not Today:   CT gapping grade II-III  Thoracic PA mobs grade II-III       Patient Education and Home Exercises     Home Exercises Provided and Patient Education Provided     Education provided:   - Home Exercise Program to be performed twice daily   - education on chronic pain and central sensitization      Written Home Exercises Provided: yes. Exercises were reviewed and Lindsay was able to demonstrate them prior to the end of the session.  Lindsay demonstrated good  understanding of the education provided. See EMR under Patient Instructions for exercises provided during therapy sessions    ASSESSMENT     Lindsay continues to note nausea, headaches, dizziness, neck pain, Left arm numbness with certain movements. She demonstrates some improvement in cervical rotation, however overall she remains guarded and limited in ability to progress rehab. Due to chronicity of symptoms and irritability of symptoms her prognosis is lengthened. Plan to extend plan of care x 1 month to address deficits.     Lindsay Is progressing well towards her goals.   Pt prognosis is Fair.     Pt will continue to benefit from skilled outpatient physical therapy to address the deficits listed in the problem list box on initial evaluation, provide pt/family education and to maximize pt's level of independence in the home and community environment.     Pt's spiritual, cultural and educational needs considered and pt agreeable to plan of care and goals.     Anticipated barriers to physical therapy: chronicity of symptoms, heightened pain response    Goals:     Short  Term Goals: 3 weeks Date last assessed: Status:   1. The patient will be educated regarding an individualized home exercise program.  5/9/2024 New   2. The patient will participate in completion of the Headache Disability Index to objectively measure headache symptoms affecting functional activities.  5/9/2024 New   5. The patient will improve her AROM with cervical extension to 50 degrees to improve her  performance of activities of daily living involving vertical head turns.  5/9/2024 New         LongTerm Goals: 6 weeks Date last assessed: Status:   1. The patient will be compliant with a home exercise program. 5/9/2024 New   2. The patient will decrease complaint of worst headache pain from 10/10 to 8/10 to decrease interference from pain with functional activities. (MDIC for chronic musculoskeletal pain = 1 point)    5/9/2024 New   5. The patient will perform active cervical range of motion in all joints and plane to facilitate activities of daily living and functional mobility related to return to work.   5/9/2024 New         Plan      Plan of care Certification: 5/9/2024 to 7/30/2024.      Reasons for Recertification of Therapy:   Lindsay continues to note nausea, headaches, dizziness, neck pain, Left arm numbness with certain movements. She demonstrates some improvement in cervical rotation, however overall she remains guarded and limited in ability to progress rehab. Due to chronicity of symptoms and irritability of symptoms her prognosis is lengthened. Plan to extend plan of care x 1 month to address deficits.       Recommended Treatment Plan: 1-2  times per week for 4 weeks: Manual Therapy, Moist Heat/ Ice, Neuromuscular Re-ed, Patient Education, Therapeutic Activities, and Therapeutic Exercise  Other Recommendations: Continue plan of care decreasing irritability of symptoms in order to address cervical dysfunction and progress tolerance to activity    Kevin Gonzales, PT, DPT   Board Certified Clinical  Specialist in Orthopedic Physical Therapy  Board Certified Clinical Specialist in Sports Physical Therapy   6/28/2024      I CERTIFY THE NEED FOR THESE SERVICES FURNISHED UNDER THIS PLAN OF TREATMENT AND WHILE UNDER MY CARE    Physician's comments:        Physician's Signature: ___________________________________________________

## 2024-06-28 NOTE — PROGRESS NOTES
[facility]   Physical Therapy Treatment Note     Name: Lindsay Sneed  Clinic Number: [id number]    Therapy Diagnosis:   Encounter Diagnoses   Name Primary?    Musculoskeletal neck pain Yes    Dizziness     Dysequilibrium      Physician: Vignesh Burns MD    Visit Date: 6/28/2024    Physician Orders: PT Eval and Treat   Medical Diagnosis from Referral: G44.59 (ICD-10-CM) - Other complicated headache syndrome   Evaluation Date: 5/9/2024  Authorization Period Expiration: 3/25/2024  Plan of Care Expiration: 6/21/2024  Progress Note Due: 6/10/2024  Visit # / Visits authorized: 3/12  FOTO: 0/ 3 (FOTO not completed this date)      Precautions: Standard    PTA Visit #: 0/5     FOTO first follow up:   FOTO second follow up:     Time In: 1038  Time Out: 1118  Total Billable Time: 40 minutes    SUBJECTIVE     Pt reports: she really doesn't see much improvement in her symptoms. Continues with headaches, dizziness, and neck pain. Working with OT and Speech. She feels that the stress of her living arrangement is limiting her ability to improve her symptoms.     She was compliant with home exercise program.  Response to previous treatment: as above   Functional change: as above     Pain: 5/10  Location: bilateral neck      OBJECTIVE     Cervical Range of Motion:     Degrees Pain Normal   Flexion 40 Yes CT junction down to T-spine    80-90 deg   Extension 30 Yes mid cervical at hinge point C4-5    normal ROM: 70-80 deg   Right Rotation 70 Yes Left upper trap     70-90 degrees   Left Rotation 65 Yes Right upper trap     70-90 degrees   Right Side Bending 30 Yes Left upper trap  20-45 degrees   Left Side Bending 30 Yes Left upper trap  20-4       Treatment   PT extender available to assist with treatment as needed     Lindsay received the treatments listed below:      therapeutic exercises to develop strength, endurance, and ROM for 40 minutes including:    UBE 4 mins fwd/4mins bwd   Assessment as  above  Seated Thoracic Extension x20 with 3s hold   Open Books x20 each side arm across chest   Wall Slides 3 x 10   No money red theraband 3x10      manual therapy techniques: Joint mobilizations were applied to the: CT junction and thoracic spine for 0 minutes, including:    Soft Tissue Mobilization to Cervical Paraspinals and Upper Trap   Cervical Side Glides Grade I Bilaterally     Not Today:   CT gapping grade II-III  Thoracic PA mobs grade II-III       Patient Education and Home Exercises     Home Exercises Provided and Patient Education Provided     Education provided:   - Home Exercise Program to be performed twice daily   - education on chronic pain and central sensitization      Written Home Exercises Provided: yes. Exercises were reviewed and Lindsay was able to demonstrate them prior to the end of the session.  Lindsay demonstrated good  understanding of the education provided. See EMR under Patient Instructions for exercises provided during therapy sessions    ASSESSMENT     Lindsay continues to note nausea, headaches, dizziness, neck pain, Left arm numbness with certain movements. She demonstrates some improvement in cervical rotation, however overall she remains guarded and limited in ability to progress rehab. Due to chronicity of symptoms and irritability of symptoms her prognosis is lengthened. Plan to extend plan of care x 1 month to address deficits.     Lindsay Is progressing well towards her goals.   Pt prognosis is Fair.     Pt will continue to benefit from skilled outpatient physical therapy to address the deficits listed in the problem list box on initial evaluation, provide pt/family education and to maximize pt's level of independence in the home and community environment.     Pt's spiritual, cultural and educational needs considered and pt agreeable to plan of care and goals.     Anticipated barriers to physical therapy: chronicity of symptoms, heightened pain response    Goals:     Short  Term Goals: 3 weeks Date last assessed: Status:   1. The patient will be educated regarding an individualized home exercise program.  5/9/2024 New   2. The patient will participate in completion of the Headache Disability Index to objectively measure headache symptoms affecting functional activities.  5/9/2024 New   5. The patient will improve her AROM with cervical extension to 50 degrees to improve her  performance of activities of daily living involving vertical head turns.  5/9/2024 New         LongTerm Goals: 6 weeks Date last assessed: Status:   1. The patient will be compliant with a home exercise program. 5/9/2024 New   2. The patient will decrease complaint of worst headache pain from 10/10 to 8/10 to decrease interference from pain with functional activities. (MDIC for chronic musculoskeletal pain = 1 point)    5/9/2024 New   5. The patient will perform active cervical range of motion in all joints and plane to facilitate activities of daily living and functional mobility related to return to work.   5/9/2024 New         Plan      Plan of care Certification: 5/9/2024 to 7/30/2024.      Reasons for Recertification of Therapy:   Lindsay continues to note nausea, headaches, dizziness, neck pain, Left arm numbness with certain movements. She demonstrates some improvement in cervical rotation, however overall she remains guarded and limited in ability to progress rehab. Due to chronicity of symptoms and irritability of symptoms her prognosis is lengthened. Plan to extend plan of care x 1 month to address deficits.       Recommended Treatment Plan: 1-2  times per week for 4 weeks: Manual Therapy, Moist Heat/ Ice, Neuromuscular Re-ed, Patient Education, Therapeutic Activities, and Therapeutic Exercise  Other Recommendations: Continue plan of care decreasing irritability of symptoms in order to address cervical dysfunction and progress tolerance to activity    Kevin Gonzales, PT, DPT   Board Certified Clinical  Specialist in Orthopedic Physical Therapy  Board Certified Clinical Specialist in Sports Physical Therapy   6/28/2024      I CERTIFY THE NEED FOR THESE SERVICES FURNISHED UNDER THIS PLAN OF TREATMENT AND WHILE UNDER MY CARE    Physician's comments:        Physician's Signature: ___________________________________________________

## 2024-06-28 NOTE — PROGRESS NOTES
Outpatient Care Management   - Care Plan Follow Up    Patient: Lindsay Sneed  MRN:  [id number]  Date of Service:  6/28/2024  Completed by:  Karolina Guzman LCSW  Referral Date: 05/10/2024    Reason for Visit   Patient presents with    OP[facility] SW Follow Up Call       Brief Summary:  spoke with Pt who reported issues surrounding getting her deposit from the bank and with being able to pay the motel this week. Discussed ideas to resolve. Pt reported she has reached out to the , who almost never gets back to her, and also his supervisor. She is unable to move forward with the apartment complex she found until she has the paystubs from him. She advised she will keep trying to reach them. Also she reported she did get a call from the RN for the waiver and they will be doing an assessment soon.     Future Appointments   Date Time Provider Department Center   7/1/2024  9:00 AM Vignesh Burns MD [facility]   7/1/2024 11:00 AM Vidhi Calvo CCC-SLP [facility]   7/2/2024 11:00 AM Kevin Gonzales, NIKKIE [facility] [place] [facility]   7/2/2024  5:30 PM Teresa Dillon OT [facility]   7/8/2024 11:00 AM Vidhi Calvo CCC-SLP [facility]   7/9/2024 11:00 AM Kevin Gonzales PT [facility] [place] [facility]   7/10/2024 10:15 AM Teresa Dillon OT [facility]   7/10/2024 11:00 AM Azra Rosario CCC-SLP [facility]   7/15/2024 10:15 AM Teresa Dillon OT [facility]   7/15/2024 11:00 AM Vidhi Calvo CCC-SLP [facility]   7/16/2024 11:00 AM Kevin Gonzales PT [facility] [place] [facility]   7/18/2024 10:15 AM Vidhi Calvo CCC-SLP [facility]   7/18/2024 11:00 AM Teresa Dillon, OT [facility] [place] [facility]   7/22/2024 10:15 AM Allison Hui, ASTON [facility] [place] [facility]   7/22/2024 11:00 AM Vidhi Calvo, CCC-SLP [facility] [place] [facility]   7/23/2024 11:00 AM Kevin Gonzales, PT  [facility]   7/25/2024 10:15 AM Allison Hui, OT [facility]   7/25/2024 11:00 AM Vidhi Calvo, CCC-SLP [facility]   7/29/2024 10:15 AM Allison Hui, OT [facility]   7/29/2024 11:00 AM Vidhi Calvo, CCC-SLP [facility]   7/30/2024 11:00 AM Kevin Gonzales, PT [facility]   8/1/2024 10:15 AM Vidhi Calvo, CCC-SLP [facility]   8/1/2024 11:00 AM Allison Hui, OT [facility]   8/5/2024 10:15 AM Allison Hui, OT [facility]   8/5/2024 11:00 AM Vidhi Calvo, CCC-SLP [facility]   8/8/2024 10:15 AM Vidhi Calvo, CCC-SLP [facility]   8/8/2024 11:00 AM Teresa Dillon, OT [facility]   8/12/2024 10:15 AM Teresa Dillon, OT [facility]   8/12/2024 11:00 AM Vidhi Calvo, CCC-SLP [facility]   8/15/2024 10:15 AM Vidhi Calvo, CCC-SLP [facility]   8/15/2024 11:00 AM Teresa Dillon, OT [facility]   8/19/2024 10:15 AM Vidhi Calvo, CCC-SLP [facility]   8/19/2024 11:00 AM Allison Hui, OT [facility]   8/22/2024 10:15 AM Allison Hui, OT [facility]   8/22/2024 11:00 AM Vidhi Calvo, CCC-SLP [facility]   8/26/2024 10:15 AM Allison Hui OT [facility]   8/26/2024 11:00 AM [facility]     Karolina Guzman LCSW  Neuro Therapy   [facility]  [phone]

## 2024-07-01 ENCOUNTER — OFFICE VISIT (OUTPATIENT)
Dept: NEUROLOGY | Facility: CLINIC | Age: 39
End: 2024-07-01
Payer: MEDICAID

## 2024-07-01 VITALS
HEART RATE: 84 BPM | SYSTOLIC BLOOD PRESSURE: 107 MMHG | HEIGHT: 67 IN | BODY MASS INDEX: 32.83 KG/M2 | WEIGHT: 209.19 LBS | DIASTOLIC BLOOD PRESSURE: 67 MMHG

## 2024-07-01 DIAGNOSIS — M54.9 DORSALGIA, UNSPECIFIED: ICD-10-CM

## 2024-07-01 DIAGNOSIS — M54.2 CERVICALGIA: Primary | ICD-10-CM

## 2024-07-01 PROCEDURE — 3008F BODY MASS INDEX DOCD: CPT | Mod: CPTII,,, | Performed by: INTERNAL MEDICINE

## 2024-07-01 PROCEDURE — 99214 OFFICE O/P EST MOD 30 MIN: CPT | Mod: S$PBB,,, | Performed by: INTERNAL MEDICINE

## 2024-07-01 PROCEDURE — 99999 PR PBB SHADOW E&M-EST. PATIENT-LVL III: CPT | Mod: PBBFAC,,, | Performed by: INTERNAL MEDICINE

## 2024-07-01 PROCEDURE — 3074F SYST BP LT 130 MM HG: CPT | Mod: CPTII,,, | Performed by: INTERNAL MEDICINE

## 2024-07-01 PROCEDURE — 3078F DIAST BP <80 MM HG: CPT | Mod: CPTII,,, | Performed by: INTERNAL MEDICINE

## 2024-07-01 PROCEDURE — 1159F MED LIST DOCD IN RCRD: CPT | Mod: CPTII,,, | Performed by: INTERNAL MEDICINE

## 2024-07-01 PROCEDURE — 99213 OFFICE O/P EST LOW 20 MIN: CPT | Mod: PBBFAC | Performed by: INTERNAL MEDICINE

## 2024-07-01 RX ORDER — NORTRIPTYLINE HYDROCHLORIDE 50 MG/1
50 CAPSULE ORAL NIGHTLY
Qty: 60 CAPSULE | Refills: 5 | Status: SHIPPED | OUTPATIENT
Start: 2024-07-01 | End: 2025-07-01

## 2024-07-01 NOTE — PROGRESS NOTES
GENERAL NEUROLOGY VISIT   07/01/2024  History:    Patient is a 39 y.o. female with no significant past medical history presenting to the clinic for follow up of post concussive symptoms.  Patient was previously seen on 03/25/2024.    Interval history 07/01/2024:   Patient does not think nortriptyline has helped with the headache.  Continues to get headaches on daily basis.  Undergoing physical therapy who later recommended occupational therapy as well as speech therapy and plan is to continue therapy until September.  She is going through significant difficulty in her personal life after this fall.  She has moved into a hotel, currently on workman's comp and unable to go back to her prior job.  She has been set up with pain management, plan is to do trial of local injections but waiting for imaging to be completed.  MRI brain has not been approved yet.  Complains of pain in neck going down the left arm associated with numbness.  No falls since last visit.  Continues to have dizziness and headaches however.    ----------------------------------------------------------------  Initial history 03/25/2024  Patient states that she hit her head to a metal object at work in mid December. She did not loose consciousness and continued to work through the day but the headache kept getting worse as the day progressed and had associated photophobia, nausea and dizziness. She went to the ER and told that she had a concussion.     Since this injury, patient has been suffering with headaches. They are usually in  the crown area and go backwards. The headache is continuous, throbbing sensation of varying intensity, 8/10 at its maximal. Has nausea and photophobia which worsen when the headaches are bad. Denies any blurry vision. She does have neck pain associated with the headache which at times spread downwards.     She states that she has had episodes of frequent dizziness, where she has feeling of room spinning around her. These  have lead to two significant falls where she had had bodily injuries. She denies any tinnitus, hearing loss. She is also experiencing poor memory and having difficulty with remembering daily activities. She mentions that daughter has noted some staring spells and patient herself has some episodes of feeling out of reality. Denies any seizure like activity. Patient is out of work since this has occurred. 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        No past medical history on file.    Past Surgical History:   Procedure Laterality Date    BLADDER SURGERY      due to fistula     SECTION      HYSTERECTOMY      TUBAL LIGATION         Social History     Socioeconomic History    Marital status: Single   Tobacco Use    Smoking status: Former     Types: Cigarettes    Smokeless tobacco: Never   Substance and Sexual Activity    Alcohol use: Never    Drug use: Never    Sexual activity: Yes     Partners: Male     Social Determinants of Health     Financial Resource Strain: High Risk (2024)    Overall Financial Resource Strain (CARDIA)     Difficulty of Paying Living Expenses: Hard   Food Insecurity: No Food Insecurity (2024)    Hunger Vital Sign     Worried About Running Out of Food in the Last Year: Never true     Ran Out of Food in the Last Year: Never true   Transportation Needs: Unmet Transportation Needs (2024)    TRANSPORTATION NEEDS     Transportation : Yes, it has kept me from medical appointments or from getting my medications.   Physical Activity: Insufficiently Active (2024)    Exercise Vital Sign     Days of Exercise per Week: 4 days     Minutes of Exercise per Session: 20 min   Stress: Stress Concern Present (2024)    [employer] - Occupational Stress Questionnaire     Feeling of Stress : Rather much   Housing Stability: High Risk (2024)    Housing Stability Vital  Sign     Unable to Pay for Housing in the Last Year: Yes     Homeless in the Last Year: Yes       Review of patient's allergies indicates:   Allergen Reactions    Adhesive     Bactrim [sulfamethoxazole-trimethoprim]        Current Outpatient Medications on File Prior to Visit   Medication Sig Dispense Refill    hydrOXYzine HCL (ATARAX) 25 MG tablet Take 1 tablet (25 mg total) by mouth 3 (three) times daily as needed for Anxiety. (Patient not taking: Reported on 12/21/2023) 30 tablet 0    meclizine (ANTIVERT) 12.5 mg tablet Take 1 tablet (12.5 mg total) by mouth 2 (two) times daily. (Patient not taking: Reported on 3/25/2024) 60 tablet 1    nortriptyline (PAMELOR) 25 MG capsule Take 1 capsule (25 mg total) by mouth every evening. 60 capsule 5    pantoprazole (PROTONIX) 40 MG tablet Take 1 tablet (40 mg total) by mouth once daily. 30 tablet 3    spironolactone (ALDACTONE) 25 MG tablet Take 1 tablet (25 mg total) by mouth once daily. (Patient not taking: Reported on 3/25/2024) 30 tablet 1     No current facility-administered medications on file prior to visit.        Family history:  Non contributary    Review Of Systems     Constitutional Negative for fevers, chills, weigh loss   HEENT Negative for hearing loss, dysphagia, sore throat, diplopia   Respiratory Negative for shortness of breath, cough    Cardiovascular Negative for chest pain, palpitations    Gastrointestinal Negative for constipation, diarrhea, early satiety    Skin Negative for rashes    Musculoskeletal Negative for joint pains, myalgias.   Neurological See Above    Psychological Negative for sleep disturbances.    Heme/Lymph Negative for easy bruising, easy bleeding    Endocrine Negative for polyuria, polydypsia     Physical Exam:     Physical Examination  There were no vitals taken for this visit.  There is no height or weight on file to calculate BMI.      Neurological Exam  Mental Status:   Alert and oriented to name, date, location, president.    Naming/Fluency/Comprehension/Repetition intact.   Recent/remote memory, registration, attention span/concentration, fund of knowledge intact by history.    CN:   II, III, IV, VI: PERRL, EOMI, no nystagmus, fundus not visualized due to inadequate dilation.V: intact to fine touch, temperature, pain.VII: symmetrical facial movement, nice smile, no drooping.VIII: grossly intact to hearing.IX, X: symmetrical palate, normal palatal elevation.XI: 5/5 SCM and 5/5 trapezius.XII: tongue midline, 5/5, no deviation or fasciculation.           Motor:   5/5 in all extremities  Tone: Normal tone in UE and LE, no atrophy, no fasciculation, no tremor no pronator drift.                Reflexes:   No Clonus, down going toes b/l.    Sensation: on both UEs and LEs    Intact in all modalities tested    Coordination:    Tremor: Absent.    Gait:    Normal    Interval/Previous Work-up:   EEG awake and drowsy 04/03/2024:  Normal    Impression:   #post concussion syndrome  Continues to have headache with gait imbalance and dizziness.  Significant neck pain radiating down to the left arm as well as lower back pain.  Set up with pain management and has had discussions for local injections.  Awaiting imaging.  We will add neck and L-spine MRI as well along with brain imaging.  EEG obtained and is unremarkable.  We believe that patient will continue to benefit from therapy.    Plan:   - pending MRI brain without contrast  - ordered MRI C-spine and L-spine without contrast  - increased nortriptyline to 50 mg nightly  - continue PT, OT and speech therapy for now.      RTC 3 months or sooner if needed      Vignesh Burns MD  Neurology

## 2024-07-02 ENCOUNTER — CLINICAL SUPPORT (OUTPATIENT)
Dept: REHABILITATION | Facility: HOSPITAL | Age: 39
End: 2024-07-02
Payer: MEDICAID

## 2024-07-02 DIAGNOSIS — H54.7 IMPAIRED VISION: ICD-10-CM

## 2024-07-02 DIAGNOSIS — R11.0 DAILY NAUSEA: ICD-10-CM

## 2024-07-02 DIAGNOSIS — R42 NONSPECIFIC DIZZINESS: ICD-10-CM

## 2024-07-02 DIAGNOSIS — R52 PAIN: Primary | ICD-10-CM

## 2024-07-02 PROCEDURE — 97530 THERAPEUTIC ACTIVITIES: CPT | Mod: PO

## 2024-07-02 NOTE — PROGRESS NOTES
"  [facility]  Occupational Therapy Treatment Note     Date: 7/2/2024  Name: Lindsay Sneed  Clinic Number: [id number]    Therapy Diagnosis:   Encounter Diagnoses   Name Primary?    Pain Yes    Impaired vision     Daily nausea     Nonspecific dizziness      Physician: Vignesh Burns MD    Physician Orders: Eval and Treat  Medical Diagnosis: G44.59 (ICD-10-CM) - Other complicated headache syndrome   Evaluation Date: 5/27/2024  Insurance Authorization Period Expiration: 12/31/2024  Progress Note(s) Due: 6/28/24, 7/26/24  Plan of Care Certification Period: 8/23/2024  Date of Return to MD: TBD  Visit # / Visits authorized: 6 / 12 + eval   FOTO: 1/ 3, last assessed on 5/29/2024       Precautions:  Standard, Fall, and seizure     Time In: 1737  Time Out: 1820  Total Billable Time: 40 minutes    Subjective     Patient reports:   Neurology increased nortripyline, ordered a new MRI. Worker's comp manager was there. "Of course they're not paying for this, so I'm still stuck in the hole." She still has 2nd opinion appointments scheduled for next week.   "They didn't pay me on time so I was almost homeless again."    Mobic was giving her relief from pain, but she's now out of that and [employer] hasn't authorized more visits or imaging.     She was not compliant with home exercise program given last session. (Daily use of guided relaxation- only did it once; 18928 grounding technique)    Response to previous treatment: tape helped, but only lasted a day or two  Functional change: no changes per pt report    Symptoms at the beginning of the session.   Headache=6/10  Nausea=8/10  Pain 7/10 - "down my back and my shoulders"     Symptoms at the end of the session.   Headache=6/10  Nausea=7/10   Pain 7-8/10 - neck, shoulders     Objective     Objective Measures updated at progress report unless specified.    At 1738, RU=178/79, HR=79    Treatment     Lindsay received the treatments listed below:  " "    therapeutic activities to improve functional performance for 40 minutes, including:    See vitals above     -UBE x10 min forward w/ brittnee UE, level 1.0, standing to increase brittnee UE act anurag and UB strength, as well as improving cardiovascular fitness. She was encouraged to keep rate of perceived exertion (RPE) at "easy to moderate" (3-4/10) and/ or keeping METs (noted on monitor) at 2.0 or better. Abarca avg=17, peak abarca=20.    Cues for mindfulness and focus on what we are trying to accomplish in today's session.     Reviewed and practiced 5-4-3-2-1 grounding exercise in 3 different locations in clinic. Pt to be able to complete this when not able to lay down to complete guided meditation or progressive muscle relaxation. Intiially she was able to complete with max cues. Improved to mod cues by 3rd round.    KT to facilitate bilateral lumbar spinal extensors (I-cut on each side of her spine, applied from origin to insertion, ~35% tension in therapeutic range and no tension in 1" tails).   KT for space correction at low back (crossing facilitation taping noted above) where pt indicated occasional discomfort (I-cut, ~25% tension, applied with tension in middle of tape & no tension in 1" tails).       Patient Education and Home Exercises     Education provided:   - Progress towards goals     Written Home Exercises Provided: patient to continue prior - using Di[employer] for progressive relaxation/ guided meditations & doing 84099 grounding exercises when calm to increase potential for completing when stressed.     Assessment     Lindsay participated well today after cues for mindfulness to present to increase focus on participation in OT session. Activity tolerance is slowly improving. Poor follow-through with progressive relaxation and grounding techniques limits her ability to improve her personal stress management. Kinesiotape remains helpful in managing neck/ back pain.     Lindsay is progressing slowly towards her " goals and there are no updates to goals at this time. Pt prognosis is Fair.     Patient will continue to benefit from skilled outpatient occupational therapy to address the deficits listed in the problem list on initial evaluation provide patient/family education and to maximize patient's level of independence in the home and community environment.     Patient's spiritual, cultural and educational needs considered and patient agreeable to plan of care and goals.    Anticipated barriers to occupational therapy: symptoms from injury     Goals:    Short Term Goals (6 weeks) Status When Last Assessed  Progressing/ Met   1) Pt to be (I) with initial HEP    (progressing 7/2/2024)     2) Pt to be able to implement progressive relaxation / mindfulness techniques into daily routine (I).    (progressing 7/2/2024)     3) Vision to be further assessed and goal added as necessary    (progressing 7/2/2024)           LongTerm Goals (12 weeks) Status When Last Assessed  Progressing/ Met   1) Pt to be able to take eye breaks as appropriate when using technology    (progressing 7/2/2024)     2) Pt to be able to participate in social media influencing task for 15 minutes  with compensatory strategy as needed with no significant increase in symptoms    (progressing 7/2/2024)     3) Pt headache to reduce to 3/10 consistently over 5/7 days.  6-8/10  (progressing 7/2/2024)     4) Pt to report implementation of compensatory strategies to enable getting through daily routine without having to get into bed to reduce symptoms    (progressing 7/2/2024)        Additional functional goals to be added as pt progresses and per her priorities.    Plan   Certification Period/Plan of care expiration: 5/27/2024 to 8/23/2024.     Outpatient Occupational Therapy 2 times weekly for 12 weeks to include the following interventions: Manual Therapy, Neuromuscular Re-ed, Patient Education, Self Care, Therapeutic Activities, and Therapeutic Exercise.      Updates/Grading for next session: visual assessment; progress note    Teresa Dillon, OT   7/2/2024

## 2024-07-09 ENCOUNTER — CLINICAL SUPPORT (OUTPATIENT)
Dept: REHABILITATION | Facility: HOSPITAL | Age: 39
End: 2024-07-09
Payer: MEDICAID

## 2024-07-09 DIAGNOSIS — M54.2 MUSCULOSKELETAL NECK PAIN: Primary | ICD-10-CM

## 2024-07-09 DIAGNOSIS — R42 DIZZINESS: ICD-10-CM

## 2024-07-09 DIAGNOSIS — R42 DYSEQUILIBRIUM: ICD-10-CM

## 2024-07-09 PROCEDURE — 97110 THERAPEUTIC EXERCISES: CPT | Mod: PN

## 2024-07-09 NOTE — PROGRESS NOTES
[facility]   Physical Therapy Treatment Note     Name: Lindsay Delongto  Clinic Number: [id number]    Therapy Diagnosis:   Encounter Diagnoses   Name Primary?    Musculoskeletal neck pain Yes    Dizziness     Dysequilibrium      Physician: Vignesh Burns MD    Visit Date: 7/9/2024    Physician Orders: PT Eval and Treat   Medical Diagnosis from Referral: G44.59 (ICD-10-CM) - Other complicated headache syndrome   Evaluation Date: 5/9/2024  Authorization Period Expiration: 3/25/2024  Plan of Care Expiration: 6/21/2024  Progress Note Due: 6/10/2024  Visit # / Visits authorized: 3/12  FOTO: 0/ 3 (FOTO not completed this date)      Precautions: Standard    PTA Visit #: 0/5     FOTO first follow up:   FOTO second follow up:     Time In: 1000  Time Out: 1040  Total Billable Time: 40 minutes    SUBJECTIVE     Pt reports: she notes some improvement in headache frequency, but she still gets headaches. Mostly wondering why her Left shoulder and arm keep going numb.     She was compliant with home exercise program.  Response to previous treatment: as above   Functional change: as above     Pain: 5/10  Location: bilateral neck      OBJECTIVE     None today       Treatment   PT extender available to assist with treatment as needed     Lindsay received the treatments listed below:      therapeutic exercises to develop strength, endurance, and ROM for 40 minutes including:    UBE 4 mins fwd/4mins bwd   Seated Thoracic Extension x20 with 3s hold   Open Books x20 each side arm across chest   Wall Slides 3 x 10   No money red theraband 3x10      manual therapy techniques: Joint mobilizations were applied to the: CT junction and thoracic spine for 0 minutes, including:    Soft Tissue Mobilization to Cervical Paraspinals and Upper Trap   Cervical Side Glides Grade I Bilaterally     Not Today:   CT gapping grade II-III  Thoracic PA mobs grade II-III       Patient Education and Home Exercises     Home Exercises  Provided and Patient Education Provided     Education provided:   - Home Exercise Program to be performed twice daily   - education on chronic pain and central sensitization      Written Home Exercises Provided: yes. Exercises were reviewed and Lindsay was able to demonstrate them prior to the end of the session.  Lindsay demonstrated good  understanding of the education provided. See EMR under Patient Instructions for exercises provided during therapy sessions    ASSESSMENT     Lindsay presents with reports of some improvement in symptoms. She inquires about her Left arm numbness, however unable to formally assess as she is unable to achieve testing position for adverse neural or cervical testing due to symptoms. Focus of treatment remains improving mobility and parascapular strengthening. Will progress as tolerated.     Lindsay Is progressing well towards her goals.   Pt prognosis is Fair.     Pt will continue to benefit from skilled outpatient physical therapy to address the deficits listed in the problem list box on initial evaluation, provide pt/family education and to maximize pt's level of independence in the home and community environment.     Pt's spiritual, cultural and educational needs considered and pt agreeable to plan of care and goals.     Anticipated barriers to physical therapy: chronicity of symptoms, heightened pain response    Goals:     Short Term Goals: 3 weeks Date last assessed: Status:   1. The patient will be educated regarding an individualized home exercise program.  5/9/2024 New   2. The patient will participate in completion of the Headache Disability Index to objectively measure headache symptoms affecting functional activities.  5/9/2024 New   5. The patient will improve her AROM with cervical extension to 50 degrees to improve her  performance of activities of daily living involving vertical head turns.  5/9/2024 New         LongTerm Goals: 6 weeks Date last assessed: Status:   1. The  patient will be compliant with a home exercise program. 5/9/2024 New   2. The patient will decrease complaint of worst headache pain from 10/10 to 8/10 to decrease interference from pain with functional activities. (MDIC for chronic musculoskeletal pain = 1 point)    5/9/2024 New   5. The patient will perform active cervical range of motion in all joints and plane to facilitate activities of daily living and functional mobility related to return to work.   5/9/2024 New         Plan      Plan of care Certification: 5/9/2024 to 7/30/2024.      Reasons for Recertification of Therapy:   Lindsay continues to note nausea, headaches, dizziness, neck pain, Left arm numbness with certain movements. She demonstrates some improvement in cervical rotation, however overall she remains guarded and limited in ability to progress rehab. Due to chronicity of symptoms and irritability of symptoms her prognosis is lengthened. Plan to extend plan of care x 1 month to address deficits.       Recommended Treatment Plan: 1-2  times per week for 4 weeks: Manual Therapy, Moist Heat/ Ice, Neuromuscular Re-ed, Patient Education, Therapeutic Activities, and Therapeutic Exercise  Other Recommendations: Continue plan of care decreasing irritability of symptoms in order to address cervical dysfunction and progress tolerance to activity    Kevin Gonzales, PT, DPT   Board Certified Clinical Specialist in Orthopedic Physical Therapy  Board Certified Clinical Specialist in Sports Physical Therapy   7/9/2024

## 2024-07-15 ENCOUNTER — OUTPATIENT CASE MANAGEMENT (OUTPATIENT)
Dept: ADMINISTRATIVE | Facility: OTHER | Age: 39
End: 2024-07-15
Payer: MEDICAID

## 2024-07-15 NOTE — PROGRESS NOTES
Outpatient Care Management   - Care Plan Follow Up    Patient: Lindsay Sneed  MRN:  [id number]  Date of Service:  7/15/2024  Completed by:  Karolina Guzman LCSW  Referral Date: 05/10/2024    Reason for Visit   Patient presents with    OPCM SW Follow Up Call       Brief Summary:  spoke with Pt to return call that she is now on the streets. She reported she was in a [employer] for 2 days and is now at her father's house with her fiance. They are working on getting jobs but she cannot start again until she is cleared for duty on the 26th. Pt adivised that due to being homeless, she was not able to make her second opinion appt so her workers comp is getting cut off. She has spoken with the adjustor, but has not received any pay stubs. She found an apt that would work, but without pay stubs, she cannot obtain it. Discussed resources to try and provided information regarding [facility]. Sent email with that info. She reported she got the waiver paperwork and submitted it already. Pending RN to call to schedule the assessment. She will continue to apply for programs and then once cleared, will start working so that she can get into the new apartment.     Future Appointments   Date Time Provider Department Center   7/18/2024 10:15 AM Vidhi Calvo CCC-SLP [facility]   7/18/2024 11:00 AM Teresa Dillon OT [facility]   7/22/2024 10:15 AM Allison Hui OT [facility]   7/22/2024 11:00 AM Vidhi Calvo CCC-SLP [facility]   7/25/2024 10:15 AM Allison Hui OT [facility]   7/25/2024 11:00 AM Vidhi Calvo CCC-SLP [facility]   7/26/2024 10:20 AM Joss Cornejo MD [facility]   7/29/2024 10:15 AM Allison Hui OT [facility]   7/29/2024 11:00 AM Vidhi Calvo CCC-SLP [facility]   8/1/2024 10:15 AM Vidhi Calvo [facility]   8/1/2024 11:00 AM Allison Hui, OT [facility]   8/5/2024 10:15 AM Allison uHi, OT [facility]   8/5/2024 11:00 AM Vidhi Calvo, CCC-SLP [facility]   8/8/2024 10:15 AM Vidhi Calvo, CCC-SLP [facility]   8/8/2024 11:00 AM Teresa Dillon, OT [facility]   8/12/2024 10:15 AM Teresa Dillon, OT [facility]   8/12/2024 11:00 AM Vidhi Calvo, CCC-SLP [facility]   8/15/2024 10:15 AM Vidhi Calvo, CCC-SLP [facility]   8/15/2024 11:00 AM Teresa Dillon, OT [facility]   8/19/2024 10:15 AM Vidhi Calvo, CCC-SLP [facility]   8/19/2024 11:00 AM Allison Hui, OT [facility]   8/22/2024 10:15 AM Allison Hui, OT [facility]   8/22/2024 11:00 AM Vidhi Calvo, CCC-SLP [facility]   8/26/2024 10:15 AM Allison Hui, OT [facility]   8/26/2024 11:00 AM Vidhi Calvo, CCC-SLP [facility]   11/4/2024  2:00 PM Vignesh Burns MD [facility]     Karolina Guzman [facility]  Neuro Therapy   [facility]  [phone]

## 2024-07-25 ENCOUNTER — DOCUMENTATION ONLY (OUTPATIENT)
Dept: REHABILITATION | Facility: HOSPITAL | Age: 39
End: 2024-07-25
Payer: MEDICAID

## 2024-07-25 NOTE — PROGRESS NOTES
"  Outpatient Therapy Discharge Summary   Discharge Date: 7/25/2024   Name: Lindsay Sneed  Clinic Number: [id number]  Therapy Diagnosis:        Encounter Diagnosis   Name Primary?    Cognitive communication deficit Yes         Physician: Vignesh Burns MD  Physician Orders: Ambulatory Referral to Speech Therapy   Medical Diagnosis: Post concussion syndrome [F07.81]   Evaluation Date: 6/4/2024    Date of Last visit: 6/25/2024  Total Visits Received: 4  Cancelled Visits: 1  No Show Visits: 3    Assessment    Assessment of Current Status: patient last seen in speech therapy 6/25/2024. Per last speech therapy note,  "Patient in good spirits across session. Majority of session today in education and structured conversation regarding speech therapy role/Plan of Care, impact and handling high stress with ongoing post-concussion symptoms, and review of cognitive buckets. Speech Language Pathologist encouraged patient to continue communication with social work in effort to alleviate stress that is adding to patient's cognitive load - patient in agreement and voiced understanding. Discussed how progress in Speech Therapy will be limited if patient's external stressors continue to fully occupy patient's cognitive buckets and the role of external stressors on ability to maintain attention. Discussed importance of nutrition in overall cognitive functioning, as patient reported she "barely eats" due to nausea. Discussed that nausea can be a side effect of medications if they are not taken with food. Plan to address use of scheduling/appointment book to improve structure of day, recall, time management, and minimalize needs/wants to reduce cognitive load and reduce negative symptoms (I.e. headache, nausea, sensitivity to light) since concussion."    Goals:   Short Term Goals: (10 weeks) Current Progress:   1. Patient will complete selective attention tasks (auditory or visual) with 90% accuracy independently increase selective " attention.     Progressing/ Not Met 6/25/2024    Goal not met at time of last speech therapy session   2.  Patient will sustain attention to complete moderate to complex reasoning tasks for 2 minutes with one request for clarification to increase sustained attention.     Progressing/ Not Met 6/25/2024    Goal not met at time of last speech therapy session   3.  Patient will use attention shifting strategies to shift attention between two tasks with no more than 3 cues or 90% accuracy to improve alternating attention.      Progressing/ Not Met 6/25/2024    Goal not met at time of last speech therapy session   4. Patient will use Goal Plan Action Review strategy to complete moderate to complex reasoning, planning, or organization tasks with 90% accuracy independently to improve functional executive function skills.     Progressing/ Not Met 6/25/2024    Goal not met at time of last speech therapy session   5. Patient will complete short term recall tasks after a 5 minutes delay with 90% accuracy  independently  with use of memory strategies to improve recall of information and generalization of memory strategies.     Progressing/ Not Met 6/25/2024    Goal not met at time of last speech therapy session   6.  Patient will complete mental manipulation tasks with 90% acc to improve working memory.     Progressing/ Not Met 6/25/2024    Goal not met at time of last speech therapy session   7. Patient will demonstrate awareness of cognitive-communication difficulties in 1-2 situations in her day in which cognitive deficits could or did compromise efficiency and performance with minimal cueing.      Progressing/ Not Met 6/25/2024    Goal not met at time of last speech therapy session   8. Patient will independently implement cognitive/sensory rest periods throughout day after identifying cognitive fatigue including limiting sensory input (sound, light, etc.)      Progressing/ Not Met 6/25/2024    Goal not met at time of last  speech therapy session   9. Patient will identify two solutions to functional daily problems with thought flexibility and minimal assistance for use of strategies.       Progressing/ Not Met 6/25/2024    Goal not met at time of last speech therapy session   10. Patient will independently generate strategies to improve ability to complete tasks with enhanced accuracy and time, based on review of objective previous performance on trials of functional tasks with 80% accuracy.      Progressing/ Not Met 6/25/2024    Goal not met at time of last speech therapy session   11. Patient will complete a task to improve attention and memory (I.e. sample bill paying activity, recipe, or multiple choice comprehension questions to 1 paragraphs) with 80% accuracy and natural environment noise distractions (TV news background, music, etc.).     Progressing/ Not Met 6/25/2024    Goal not met at time of last speech therapy session       Long Term Goals:  Long Term Goals: (12 weeks) Current Progress:    Patient will improve  attention skills to effectively attend to and communicate in complex daily living tasks in functional living environment.     Goal not met at time of last speech therapy session   2.  Patient will demonstrate use of  self-monitoring during daily living activities to improve safety and awareness in functional living environment.    Goal not met at time of last speech therapy session   3.  Patient will apply problem-solving strategies with visual support in daily living functional activities at home and in the community.     Goal not met at time of last speech therapy session   4.  Patient will predict objective performance on completion of actual tasks to increase independence with required return to daily living environment.     Goal not met at time of last speech therapy session   5.  Patient will use appropriate memory strategies to schedule and recall weekly activities, express needs and recall names to maintain  safety and participate socially in functional living environment.     Goal not met at time of last speech therapy session        Discharge reason: Patient has not attended therapy since 6/25/2024    Plan   This patient is discharged from Speech Therapy    Jessica Singh CCC-SLP   7/25/2024

## 2024-07-26 ENCOUNTER — OFFICE VISIT (OUTPATIENT)
Dept: FAMILY MEDICINE | Facility: HOSPITAL | Age: 39
End: 2024-07-26
Payer: MEDICAID

## 2024-07-26 VITALS
BODY MASS INDEX: 33.26 KG/M2 | SYSTOLIC BLOOD PRESSURE: 114 MMHG | DIASTOLIC BLOOD PRESSURE: 78 MMHG | HEART RATE: 85 BPM | WEIGHT: 211.88 LBS | HEIGHT: 67 IN

## 2024-07-26 DIAGNOSIS — F07.81 POST CONCUSSION SYNDROME: Primary | ICD-10-CM

## 2024-07-26 DIAGNOSIS — M25.512 CHRONIC LEFT SHOULDER PAIN: ICD-10-CM

## 2024-07-26 DIAGNOSIS — G89.29 CHRONIC LEFT SHOULDER PAIN: ICD-10-CM

## 2024-07-26 DIAGNOSIS — K21.9 GASTROESOPHAGEAL REFLUX DISEASE, UNSPECIFIED WHETHER ESOPHAGITIS PRESENT: ICD-10-CM

## 2024-07-26 DIAGNOSIS — R63.0 DECREASED APPETITE: ICD-10-CM

## 2024-07-26 DIAGNOSIS — R11.0 DAILY NAUSEA: ICD-10-CM

## 2024-07-26 PROCEDURE — 99213 OFFICE O/P EST LOW 20 MIN: CPT

## 2024-07-26 RX ORDER — MELOXICAM 7.5 MG/1
7.5 TABLET ORAL DAILY
Qty: 30 TABLET | Refills: 0 | Status: SHIPPED | OUTPATIENT
Start: 2024-07-26 | End: 2024-08-25

## 2024-07-26 RX ORDER — MIRTAZAPINE 7.5 MG/1
7.5 TABLET, FILM COATED ORAL NIGHTLY
Qty: 30 TABLET | Refills: 11 | Status: SHIPPED | OUTPATIENT
Start: 2024-07-26 | End: 2025-07-26

## 2024-07-26 RX ORDER — FAMOTIDINE 40 MG/1
40 TABLET, FILM COATED ORAL DAILY
Qty: 30 TABLET | Refills: 11 | Status: SHIPPED | OUTPATIENT
Start: 2024-07-26 | End: 2025-07-26

## 2024-07-26 RX ORDER — ONDANSETRON 4 MG/1
8 TABLET, ORALLY DISINTEGRATING ORAL 2 TIMES DAILY PRN
Qty: 60 TABLET | Refills: 0 | Status: SHIPPED | OUTPATIENT
Start: 2024-07-26 | End: 2024-08-25
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
## 2024-07-29 ENCOUNTER — OUTPATIENT CASE MANAGEMENT (OUTPATIENT)
Dept: ADMINISTRATIVE | Facility: OTHER | Age: 39
End: 2024-07-29
Payer: MEDICAID

## 2024-07-29 NOTE — PROGRESS NOTES
Outpatient Care Management   - Care Plan Follow Up    Patient: Lindsay Sneed  MRN:  [id number]  Date of Service:  7/29/2024  Completed by:  Karolina Guzman LCSW  Referral Date: 05/10/2024    Reason for Visit   Patient presents with    OPCM SW Follow Up Call       Brief Summary:  spoke with Pt to check in. Pt still living with her fiance and her father. She is returning to work once they have processed her paperwork. She is still trying to get back her food stamps and get her own place. Provided additional voucher information through the [facility] she can try for and the information to call [facility] to check on her place on the waitlist. She has submitted the paperwork for the waiver that was requested and is pending a response.     Future Appointments   Date Time Provider Department Center   11/4/2024  2:00 PM Vignesh Burns MD [facility]     Karolina Guzman LCSW  Neuro Therapy   [facility]  [phone]

## 2024-08-07 ENCOUNTER — OUTPATIENT CASE MANAGEMENT (OUTPATIENT)
Dept: ADMINISTRATIVE | Facility: OTHER | Age: 39
End: 2024-08-07
Payer: MEDICAID

## 2024-08-08 DIAGNOSIS — G44.59 OTHER COMPLICATED HEADACHE SYNDROME: Primary | ICD-10-CM

## 2024-08-12 ENCOUNTER — HOSPITAL ENCOUNTER (EMERGENCY)
Facility: HOSPITAL | Age: 39
Discharge: HOME | End: 2024-08-12
Attending: EMERGENCY MEDICINE
Payer: MEDICAID

## 2024-08-12 ENCOUNTER — OUTPATIENT CASE MANAGEMENT (OUTPATIENT)
Dept: ADMINISTRATIVE | Facility: OTHER | Age: 39
End: 2024-08-12
Payer: MEDICAID

## 2024-08-12 VITALS
BODY MASS INDEX: 34.07 KG/M2 | WEIGHT: 212 LBS | RESPIRATION RATE: 18 BRPM | TEMPERATURE: 98 F | HEIGHT: 66 IN | DIASTOLIC BLOOD PRESSURE: 66 MMHG | HEART RATE: 100 BPM | SYSTOLIC BLOOD PRESSURE: 117 MMHG | OXYGEN SATURATION: 98 %

## 2024-08-12 DIAGNOSIS — S29.9XXA CHEST INJURY, INITIAL ENCOUNTER: ICD-10-CM

## 2024-08-12 DIAGNOSIS — M54.2 NECK PAIN ON LEFT SIDE: ICD-10-CM

## 2024-08-12 DIAGNOSIS — M25.512 ACUTE PAIN OF LEFT SHOULDER: ICD-10-CM

## 2024-08-12 DIAGNOSIS — T07.XXXA ABRASIONS OF MULTIPLE SITES: ICD-10-CM

## 2024-08-12 DIAGNOSIS — R45.89 FEELING ANXIOUS: ICD-10-CM

## 2024-08-12 DIAGNOSIS — T74.91XA DOMESTIC VIOLENCE OF ADULT, INITIAL ENCOUNTER: Primary | ICD-10-CM

## 2024-08-12 PROCEDURE — 96372 THER/PROPH/DIAG INJ SC/IM: CPT

## 2024-08-12 PROCEDURE — 63600175 PHARM REV CODE 636 W HCPCS

## 2024-08-12 PROCEDURE — 99285 EMERGENCY DEPT VISIT HI MDM: CPT | Mod: 25

## 2024-08-12 RX ORDER — LIDOCAINE HYDROCHLORIDE 20 MG/ML
SOLUTION OROPHARYNGEAL
Qty: 100 ML | Refills: 0 | Status: SHIPPED | OUTPATIENT
Start: 2024-08-12

## 2024-08-12 RX ORDER — KETOROLAC TROMETHAMINE 10 MG/1
10 TABLET, FILM COATED ORAL EVERY 6 HOURS
Qty: 20 TABLET | Refills: 0 | Status: SHIPPED | OUTPATIENT
Start: 2024-08-12 | End: 2024-08-17

## 2024-08-12 RX ORDER — METHOCARBAMOL 500 MG/1
500 TABLET, FILM COATED ORAL 4 TIMES DAILY
Qty: 40 TABLET | Refills: 0 | Status: SHIPPED | OUTPATIENT
Start: 2024-08-12 | End: 2024-08-22

## 2024-08-12 RX ORDER — KETOROLAC TROMETHAMINE 30 MG/ML
30 INJECTION, SOLUTION INTRAMUSCULAR; INTRAVENOUS
Status: COMPLETED | OUTPATIENT
Start: 2024-08-12 | End: 2024-08-12

## 2024-08-12 RX ORDER — GABAPENTIN 100 MG/1
100 CAPSULE ORAL 3 TIMES DAILY
Qty: 30 CAPSULE | Refills: 0 | Status: SHIPPED | OUTPATIENT
Start: 2024-08-12 | End: 2024-08-22

## 2024-08-12 RX ADMIN — KETOROLAC TROMETHAMINE 30 MG: 30 INJECTION, SOLUTION INTRAMUSCULAR; INTRAVENOUS at 03:08

## 2024-08-12 NOTE — DISCHARGE INSTRUCTIONS
All of your imaging today looked great! However, you will need follow up with your primary care physician and Psychiatry for further workup and management.  I have prescribed you gabapentin, Toradol, Robaxin to use upon discharge.  Do not take gabapentin and Robaxin while operating heavy machinery or driving, as this may make you drowsy.    Thank you for allowing me and my emergency team to take care of you here today! I hope you feel better soon. Please do not hesitate to return with any additional concerns that may arise from this or any new problem you encounter.    Our goal in the emergency department is to always give you outstanding care and exceptional service. If you receive a survey by mail or e-mail in the next week regarding your experience in our ED, we would greatly appreciate you completing it. Your feedback provides us with a way to recognize our staff who give very good care and it helps us learn how to improve when your experience was below the excellence we aspire to be!    Brook Juneau, PA-C [facility]   Emergency Room Physician Assistant
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
## 2024-08-12 NOTE — Clinical Note
"Lindsay Chudean Sneed was seen and treated in our emergency department on 8/12/2024.  She may return to work on 08/15/2024.       If you have any questions or concerns, please don't hesitate to call.      Chloe Matamoros PA-C"
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
## 2024-08-15 ENCOUNTER — TELEPHONE (OUTPATIENT)
Dept: FAMILY MEDICINE | Facility: HOSPITAL | Age: 39
End: 2024-08-15
Payer: MEDICAID

## 2024-08-15 NOTE — TELEPHONE ENCOUNTER
----- Message from Alma Valles sent at 8/14/2024 10:49 AM CDT -----  Regarding: same day appt  Type:  Same Day Appointment Request    Caller is requesting a same day appointment.  Caller declined first available appointment listed below.    Name of Caller:Lindsay  When is the first available appointment?Aug 22  Symptoms:std screening   Best Call Back Number:[phone]  Additional Information:
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
## 2024-08-16 ENCOUNTER — HOSPITAL ENCOUNTER (OUTPATIENT)
Dept: RADIOLOGY | Facility: HOSPITAL | Age: 39
Discharge: HOME | End: 2024-08-16
Attending: INTERNAL MEDICINE
Payer: MEDICAID

## 2024-08-16 DIAGNOSIS — G44.59 OTHER COMPLICATED HEADACHE SYNDROME: ICD-10-CM

## 2024-08-16 DIAGNOSIS — M54.2 CERVICALGIA: ICD-10-CM

## 2024-08-16 DIAGNOSIS — M54.9 DORSALGIA, UNSPECIFIED: ICD-10-CM

## 2024-08-16 PROCEDURE — 72141 MRI NECK SPINE W/O DYE: CPT | Mod: TC

## 2024-08-16 PROCEDURE — 70551 MRI BRAIN STEM W/O DYE: CPT | Mod: TC

## 2024-08-16 PROCEDURE — 72148 MRI LUMBAR SPINE W/O DYE: CPT | Mod: TC

## 2024-08-16 PROCEDURE — 72141 MRI NECK SPINE W/O DYE: CPT | Mod: 26,,, | Performed by: RADIOLOGY

## 2024-08-16 PROCEDURE — 70551 MRI BRAIN STEM W/O DYE: CPT | Mod: 26,,, | Performed by: RADIOLOGY

## 2024-08-16 PROCEDURE — 72148 MRI LUMBAR SPINE W/O DYE: CPT | Mod: 26,,, | Performed by: RADIOLOGY

## 2024-08-22 ENCOUNTER — OUTPATIENT CASE MANAGEMENT (OUTPATIENT)
Dept: ADMINISTRATIVE | Facility: OTHER | Age: 39
End: 2024-08-22
Payer: MEDICAID
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
## 2024-08-30 ENCOUNTER — OUTPATIENT CASE MANAGEMENT (OUTPATIENT)
Dept: ADMINISTRATIVE | Facility: OTHER | Age: 39
End: 2024-08-30
Payer: COMMERCIAL
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
## 2024-09-10 ENCOUNTER — OUTPATIENT CASE MANAGEMENT (OUTPATIENT)
Dept: ADMINISTRATIVE | Facility: OTHER | Age: 39
End: 2024-09-10
Payer: COMMERCIAL
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
## 2024-09-24 ENCOUNTER — OUTPATIENT CASE MANAGEMENT (OUTPATIENT)
Dept: ADMINISTRATIVE | Facility: OTHER | Age: 39
End: 2024-09-24
Payer: MEDICAID

## 2024-09-24 ENCOUNTER — OFFICE VISIT (OUTPATIENT)
Dept: NEUROLOGY | Facility: CLINIC | Age: 39
End: 2024-09-24
Payer: MEDICAID

## 2024-09-24 VITALS
HEIGHT: 66 IN | DIASTOLIC BLOOD PRESSURE: 75 MMHG | HEART RATE: 70 BPM | SYSTOLIC BLOOD PRESSURE: 116 MMHG | BODY MASS INDEX: 33.31 KG/M2 | WEIGHT: 207.25 LBS

## 2024-09-24 DIAGNOSIS — M54.2 CERVICALGIA: Primary | ICD-10-CM

## 2024-09-24 DIAGNOSIS — F07.81 POST CONCUSSION SYNDROME: ICD-10-CM

## 2024-09-24 PROCEDURE — 99214 OFFICE O/P EST MOD 30 MIN: CPT | Mod: PBBFAC | Performed by: INTERNAL MEDICINE

## 2024-09-24 PROCEDURE — 99999 PR PBB SHADOW E&M-EST. PATIENT-LVL IV: CPT | Mod: PBBFAC,,, | Performed by: INTERNAL MEDICINE

## 2024-09-24 NOTE — PROGRESS NOTES
Outpatient Care Management   - Care Plan Follow Up    Patient: Lindsay Sneed  MRN:  [id number]  Date of Service:  9/24/2024  Completed by:  Karolina Guzman LCSW  Referral Date: 05/10/2024    Reason for Visit   Patient presents with    OPCM SW Follow Up Call       Brief Summary:  received email from Pt workers comp rep regarding being at Pt neuro appt today with Pt. They are recommending continuing therapy. She advised wanting Pt to get scheduled asap for therapy. SW initiated contact with the PARA, supevisor and PT in the [facility]. Pending appts to be scheduled. Updated workers comp.    Future Appointments   Date Time Provider Department Center   12/5/2024 10:00 AM [facility] [place] [facility]   1/28/2025  9:00 AM Vignesh Burns MD [facility]     Karolina Guzman LCSW  Neuro Therapy   [facility]  [phone]

## 2024-10-02 ENCOUNTER — OUTPATIENT CASE MANAGEMENT (OUTPATIENT)
Dept: ADMINISTRATIVE | Facility: OTHER | Age: 39
End: 2024-10-02
Payer: MEDICAID
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
## 2024-10-11 ENCOUNTER — CLINICAL SUPPORT (OUTPATIENT)
Dept: REHABILITATION | Facility: HOSPITAL | Age: 39
End: 2024-10-11
Attending: INTERNAL MEDICINE
Payer: COMMERCIAL

## 2024-10-11 ENCOUNTER — OUTPATIENT CASE MANAGEMENT (OUTPATIENT)
Dept: ADMINISTRATIVE | Facility: OTHER | Age: 39
End: 2024-10-11
Payer: MEDICAID

## 2024-10-11 DIAGNOSIS — H55.81 SACCADIC EYE MOVEMENT DEFICIENCY: ICD-10-CM

## 2024-10-11 DIAGNOSIS — R42 DIZZINESS: Primary | ICD-10-CM

## 2024-10-11 DIAGNOSIS — M54.2 NECK PAIN: ICD-10-CM

## 2024-10-11 DIAGNOSIS — F07.81 POST CONCUSSION SYNDROME: ICD-10-CM

## 2024-10-11 DIAGNOSIS — R68.89 DECREASED ACTIVITY TOLERANCE: ICD-10-CM

## 2024-10-11 PROCEDURE — 97163 PT EVAL HIGH COMPLEX 45 MIN: CPT | Mod: PN

## 2024-10-11 PROCEDURE — 97535 SELF CARE MNGMENT TRAINING: CPT | Mod: PN

## 2024-10-11 PROCEDURE — 96125 COGNITIVE TEST BY HC PRO: CPT | Mod: PN

## 2024-10-11 NOTE — PROGRESS NOTES
Outpatient Care Management   - Care Plan Follow Up    Patient: Lindsay Sneed  MRN:  [id number]  Date of Service:  10/11/2024  Completed by:  Karolina Guzman LCSW  Referral Date: 05/10/2024    Reason for Visit   Patient presents with    Other     Task completion    OPCM SW Follow Up Call       Brief Summary:  spoke with therapy team, pre-service, central scheduling, and workers comp. Through their efforts, Pt was auth for PT/SLP and was seen today. SLP reported Pt is also requesting OT. Noted that the referral from the MD does include it, and requested this from the team as well. Spoke with Pt. Provided resources to help with rental assistance and mental health providers.     Future Appointments   Date Time Provider Department Center   10/14/2024 11:15 AM Disha Hopson PTA [facility] [place] [facility]   10/18/2024  1:45 PM Disha Hopson PTA [facility]   11/1/2024  1:45 PM Disha Hopson, PTA [facility] [place] [facility]   11/4/2024 10:30 AM Jessica Mon, PT [facility]   11/8/2024  1:00 PM Disha Hopson, PTA [facility] [place] [facility]   11/11/2024 10:30 AM Jessica Mon, PT [facility]   11/15/2024 12:00 PM Jessica Mon, PT [facility] [place] [facility]   11/18/2024  1:45 PM Jessica Mon, PT [facility]   11/22/2024 12:00 PM Jessica Mon, PT [facility] [place] [facility]   12/5/2024 10:00 AM [facility] [place]URO [facility] [place] [facility]   1/28/2025  9:00 AM Vignesh Burns MD [facility] NEURO [facility] [place] [place] [facility]     Karolina Guzman LCSW  Neuro Therapy   [facility]  [phone]
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
## 2024-10-13 PROBLEM — S06.0XAS HEADACHE DUE TO OLD CONCUSSION: Status: ACTIVE | Noted: 2024-05-10

## 2024-10-13 PROBLEM — M54.2 NECK PAIN: Status: ACTIVE | Noted: 2024-10-13

## 2024-10-13 PROBLEM — R42 DIZZINESS: Status: ACTIVE | Noted: 2024-10-13

## 2024-10-13 PROBLEM — R42 DYSEQUILIBRIUM: Status: RESOLVED | Noted: 2024-05-10 | Resolved: 2024-10-13

## 2024-10-13 PROBLEM — M54.2 MUSCULOSKELETAL NECK PAIN: Status: RESOLVED | Noted: 2024-05-10 | Resolved: 2024-10-13

## 2024-10-13 PROBLEM — R68.89 DECREASED ACTIVITY TOLERANCE: Status: ACTIVE | Noted: 2024-10-13

## 2024-10-13 PROBLEM — H55.81 SACCADIC EYE MOVEMENT DEFICIENCY: Status: ACTIVE | Noted: 2024-10-13

## 2024-10-13 PROBLEM — G44.309 HEADACHE DUE TO OLD CONCUSSION: Status: ACTIVE | Noted: 2024-05-10

## 2024-10-13 PROBLEM — R42 DIZZINESS: Status: RESOLVED | Noted: 2024-05-10 | Resolved: 2024-10-13

## 2024-10-14 ENCOUNTER — DOCUMENTATION ONLY (OUTPATIENT)
Dept: REHABILITATION | Facility: HOSPITAL | Age: 39
End: 2024-10-14
Payer: MEDICAID
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
## 2024-10-14 NOTE — PROGRESS NOTES
Physical therapist and physical therapy assistant(s) met face to face on 10/14/2024 to discuss patient's treatment plan and progress towards established goals. Patient will be seen by a physical therapist minimally every 6th visit or every 30 days.    EARLENE TAVERA PT

## 2024-10-21 ENCOUNTER — CLINICAL SUPPORT (OUTPATIENT)
Dept: REHABILITATION | Facility: HOSPITAL | Age: 39
End: 2024-10-21
Payer: COMMERCIAL

## 2024-10-21 ENCOUNTER — OUTPATIENT CASE MANAGEMENT (OUTPATIENT)
Dept: ADMINISTRATIVE | Facility: OTHER | Age: 39
End: 2024-10-21
Payer: MEDICAID

## 2024-10-21 DIAGNOSIS — G44.309 HEADACHE DUE TO OLD CONCUSSION: Primary | ICD-10-CM

## 2024-10-21 DIAGNOSIS — M54.2 NECK PAIN: ICD-10-CM

## 2024-10-21 DIAGNOSIS — R68.89 DECREASED ACTIVITY TOLERANCE: ICD-10-CM

## 2024-10-21 DIAGNOSIS — S06.0XAS HEADACHE DUE TO OLD CONCUSSION: Primary | ICD-10-CM

## 2024-10-21 DIAGNOSIS — R42 DIZZINESS: ICD-10-CM

## 2024-10-21 DIAGNOSIS — H55.81 SACCADIC EYE MOVEMENT DEFICIENCY: ICD-10-CM

## 2024-10-21 PROCEDURE — 97110 THERAPEUTIC EXERCISES: CPT | Mod: PN

## 2024-10-21 PROCEDURE — 97112 NEUROMUSCULAR REEDUCATION: CPT | Mod: PN

## 2024-10-21 NOTE — PROGRESS NOTES
Outpatient Care Management   - Care Plan Follow Up    Patient: Lindsay Sneed  MRN:  [id number]  Date of Service:  10/25/2024  Completed by:  Karolina Guzman LCSW  Referral Date: 05/10/2024    Reason for Visit   Patient presents with    Other     10/21/2024  1st attempt to complete Follow-Up for Outpatient Care Management, left message. Attempt letter sent.        OPCM  Follow Up Call     10/25/24       Brief Summary:    spoke with Pt to check in. She does not need SLP anymore and still pending OT to start. Still trying to get couseling, sent new options for her to try.     Future Appointments   Date Time Provider Department Center   11/1/2024  1:45 PM Disha Hopson, PTA [facility]   11/4/2024 10:30 AM Jessica Mon, PT [facility]   11/8/2024  1:00 PM Disha Hopson, PTA [facility]   11/11/2024 10:30 AM Jessica Mon, PT [facility]   11/15/2024 12:00 PM Jessica Mon, PT [facility]   11/18/2024  1:45 PM Jessica Mon, PT [facility]   11/22/2024 12:00 PM Jessica Mon, PT [facility]   12/5/2024 10:00 AM EM[facility], [facility]   1/28/2025  9:00 AM Vignesh Burns MD [facility]     Karolina Guzman LCSW  Neuro Therapy   [facility]  [phone]

## 2024-10-21 NOTE — PROGRESS NOTES
[facility]   Physical Therapy Treatment Note      Name: Lindsay Riososito  Clinic Number: [id number]    Therapy Diagnosis:   Encounter Diagnoses   Name Primary?    Headache due to old concussion Yes    Dizziness     Decreased activity tolerance     Neck pain     Saccadic eye movement deficiency      Physician: Vignesh Burns MD    Visit Date: 10/21/2024    Physician Orders: PT Eval and Treat   Medical Diagnosis from Referral: Post concussion syndrome [F07.81]   Evaluation Date: 10/11/2024  Authorization Period Expiration: 9/26/2025  Plan of Care Expiration: 11/22/2024  Visit # / Visits authorized: 2/12     Foto  Date  Score    #1/3 10/11/2024 46%   #2/3       #3/3          Time In: 11:10AM   Time Out: 11:55 AM  Total Appointment Time (timed & untimed codes): 45 minutes (1 TE, 2NR)     Precautions: Standard and Fall    PTA Visit #: 0/5       Subjective     Patient reports: She saw pain management and they will start injections Nov 15th.   She  will be given home exercise program today.      Response to previous treatment: eval only thus far  Functional change: ongoing    Pain: 9/10  Location:  neck, left arm, back, legs      Objective      Objective Measures updated at progress report unless specified.     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      neuromuscular re-education activities to improve: Balance and Coordination for 37 minutes. The following activities were included:  Functional Gait Assessment:    1. Gait on level surface (6m) =  2               (3) Normal: less than 5.5 sec, no A.D., no imbalance, normal gait pattern, deviates< 6in               (2) Mild impairment: 7-5.6 sec, uses A.D., mild gait deviations, or deviates 6-10 in              (1) Moderate impairment: > 7 sec, slow speed, imbalance, deviates 10-15 in.              (0) Severe impairment: needs assist, deviates >15 in, reach/touch wall  2. Change in Gait Speed = 2              (3) Normal: smooth change w/o loss of balance or gait deviation, deviates < 6 in, significant difference between speeds              (2) Mild impairment: changes speed, but demonstrates mild gait deviations, deviates 6-10 in, OR no deviations but unable to significantly speed, OR uses A.D.              (1) Moderate impairment: minor changes to speed, OR changes speed w/ significant deviations, deviates 10-15 in, OR  Changes speed , but loses balance & recovers              (0) Severe impairment: cannot change speed, deviates >15 in, or loses balance & needs assist  3. Gait with horizontal head turns  = 1              (3) Normal: no change in gait, deviates <6 in              (2) Mild impairment: slight change in speed, deviates 6-10 in, OR uses A.D.              (1) Moderate impairment: moderate change in speed, deviates 10-15 in              (0) Severe impairment: severe disruption of gait, deviates >15in  4. Gait with vertical head turns = 1              (3) Normal: no change in gait, deviates <6 in              (2) Mild impairment: slight change in speed, deviates 6-10 in OR uses A.D.              (1) Moderate impairment: moderate change in speed, deviates 10-15 in              (0) Severe impairment: severe disruption of gait, deviates >15 in  5. Gait with pivot turns = 2              (3) Normal: performs safely in 3 sec, no LOB              (2) Mild impairment: performs in >3 sec & no LOB, OR turns safely & requires several steps to regain LOB              (1) Moderate impairment: turns slow, OR requires several small steps for balance following turn & stop              (0) Severe impairment: cannot turn safely,  needs assist  6. Step over obstacle = 2              (3) Normal: steps over 2 stacked boxes w/o change in speed or LOB              (2) Mild impairment: able to step over 1 box w/o change in speed or LOB              (1) Moderate impairment: steps over 1 box but must slow down, may require VC              (0) Severe impairment: cannot perform w/o assist  7. Gait with Narrow EARNEST = 3              (3) Normal: 10 steps no staggering              (2) Mild impairment: 7-9 steps              (1) Moderate impairment: 4-7 steps              (0) Severe impairment: < 4 steps or cannot perform w/o assist  8. Gait with eyes closed = 1              (3) Normal: < 7 sec, no A.D., no LOB, normal gait pattern, deviates <6 in              (2) Mild impairment: 7.1-9 sec, mild gait deviations, deviates 6-10 in              (1) Moderate impairment: > 9 sec, abnormal pattern, LOB, deviates 10-15 in              (0) Severe impairment: cannot perform w/o assist, LOB, deviates >15in  9. Ambulating Backwards = 2              (3) Normal: no A.D., no LOB, normal gait pattern, deviates <6in              (2) Mild impairment: uses A.D., slower speed, mild gait deviations, deviates 6-10 in              (1) Moderate impairment: slow speed, abnormal gait pattern, LOB, deviates 10-15 in              (0) Severe impairment: severe gait deviations or LOB, deviates >15in  10. Steps = 2              (3) Normal: alternating feet, no rail              (2) Mild Impairment: alternating feet, uses rail              (1) Moderate impairment: step-to, uses rail              (0) Severe impairment: cannot perform safely     Score 18/30      Score:   <22/30 fall risk   <20/30 fall risk in older adults   <18/30 fall risk in Parkinsons     Oculomotor exercises: home exercise program in bold  VOR horizontal - 3 x 15-20s - nausea and dizziness   VOR vertical - 3 x 15-20s - nausea and dizziness   Saccades horizontal  3x 20s - dizziness  Saccades vertical - NP - add next  visit  Smooth pursuits vertical and horizontal - headache and dizziness    Pencil push ups x 5 reps, 10s holds - headache, nausea and dizziness       Patient Education and Home Exercises       Education provided:   - 10/21 posture/ergonomics at work, oculomotor home exercise program     Written Home Exercises Provided: Yes. Exercises were reviewed and Lindsay was able to demonstrate them prior to the end of the session.  Lindsay demonstrated good  understanding of the education provided. See Electronic Medical Record under Patient Instructions for exercises provided during therapy sessions    Assessment     Lindsay presented with high pain levels. Focus today was on further balance assessment with the Functional Gait Assessment for which she scored in high fall risk at 18/30. Oculomotor exercises and home exercise program initiated today with pt highly symptomatic form the moves and tolerating only 15-20s of each task. She did not tolerate cervical stretches well with increased symptoms and pain so only 1 rep completed and education completed for posture/ergonomics. She would benefit from skilled physical therapy services to address oculomotor coordination, improve motion tolerance, decrease pain, decrease risk for future falls/injury, and return to full duty work status.     Lindsay Is progressing well towards her goals.   Patient prognosis is Fair.     Patient will continue to benefit from skilled outpatient physical therapy to address the deficits listed in the problem list box on initial evaluation, provide pt/family education and to maximize pt's level of independence in the home and community environment.     Patient's spiritual, cultural and educational needs considered and pt agreeable to plan of care and goals.     Anticipated barriers to physical therapy: transportation, non-work related co-morbidities, chronicity of current injury, fear of re-injury, objective abilities vs. self-perceived abilities, and  self-limiting behaviors due to pain    Goals: Goals:   Short Terms Goals: 3 weeks   Patient will be 100% independent with HEP in order to maximize physical therapy benefits  Patient will score >/=56% on FOTO survey in order to improve general activity tolerance and improve overall quality of life   Patient will complete >/=20 seconds of saccade activity at >/=100 bpm in order to improve oculomotor function for reading tasks  Patient will complete >/=20 seconds of VOR activity at >/=105 bpm in order to improve vestibular function for fixation tasks  Patient will improve cervical active range of motion in limited planes by >/=5 degrees in order to improve functional mobility      Long Term Goals: 6 weeks   Patient will score >/=66% on FOTO survey in order to improve general activity tolerance and improve overall quality of life   Patient will complete >/=20 seconds of saccade activity at >/=115 bpm in order to improve oculomotor function for reading tasks  Patient will complete >/=20 seconds of VOR activity at >/=125 bpm in order to improve vestibular function for fixation tasks  Patient will improve cervical active range of motion in limited planes by >/=10 degrees in order to improve functional mobility   Patient will report subjective ratings in VOMS assessment (dizziness, headache, nausea, and fogginess) as </=5/10 in all domains in order to improve general activity tolerance    Plan     Continue oculomotor exercises as primary focus, with balance and motion tolerance.     Crystal Freed, PT

## 2024-10-21 NOTE — PATIENT INSTRUCTIONS
Vestibular Rehabilitation Exercises      General Information for Eye Exercises   Target must remain in focus, not blurry, and appear stationary while head is in motion.   Speed of eye movement should be increased as long as the target stays in focus.   If you use glasses, wear them while performing exercises.   These exercises may provoke symptoms of dizziness or nausea. Work through these symptoms.  If too dizzy, slow eye movement down slightly. Rest between each exercise.    Oculomotor: Smooth pursuits  Holding a single target, keep eyes fixed on target.  Slowly move it side-to-side/up-down/diagonally while head stays still.  Move 30 seconds each direction.  Repeat 3 times per session. Do 1-2 sessions per day.      Oculomotor: Saccades  Holding two stationary targets placed inches apart side-to-side/up-down/diagonally, move eyes quickly from target to target as head stays still.  Move 30 seconds each direction.     Repeat 3 times per session. Do 1-2 sessions per day.        Head exercises / Gaze stabilization   Target must remain in focus, not blurry, and appear stationary while head is in motion   Perform exercise with little head movement (45º to either side of midline).   Speed of head movement should be increased as long as the target stays in focus.   If you use glasses, wear them while performing exercises.   These exercises may provoke symptoms of dizziness or nausea. Work through these symptoms.  If too dizzy, slow eye movement down slightly. Rest between each exercise.   Exercises demand concentration; avoid distractions.   For safety, standing exercises must be performed close to a counter or next to someone.    Gaze stabilization: Sitting  Keep eyes fixed on single stationary target held in hand or placed on wall 5-10 feet away and move head side to side for 30 seconds. Repeat 3 times.     Repeat 3 x 30 times while moving head up and down.     Do 1-2 sessions per  day.    [email or web address]

## 2024-11-01 ENCOUNTER — CLINICAL SUPPORT (OUTPATIENT)
Dept: REHABILITATION | Facility: HOSPITAL | Age: 39
End: 2024-11-01
Payer: COMMERCIAL

## 2024-11-01 DIAGNOSIS — R42 DIZZINESS: ICD-10-CM

## 2024-11-01 DIAGNOSIS — H55.81 SACCADIC EYE MOVEMENT DEFICIENCY: ICD-10-CM

## 2024-11-01 DIAGNOSIS — R68.89 DECREASED ACTIVITY TOLERANCE: ICD-10-CM

## 2024-11-01 DIAGNOSIS — S06.0XAS HEADACHE DUE TO OLD CONCUSSION: Primary | ICD-10-CM

## 2024-11-01 DIAGNOSIS — M54.2 NECK PAIN: ICD-10-CM

## 2024-11-01 DIAGNOSIS — G44.309 HEADACHE DUE TO OLD CONCUSSION: Primary | ICD-10-CM

## 2024-11-01 PROCEDURE — 97110 THERAPEUTIC EXERCISES: CPT | Mod: PN,CQ

## 2024-11-01 PROCEDURE — 97112 NEUROMUSCULAR REEDUCATION: CPT | Mod: PN,CQ

## 2024-11-04 ENCOUNTER — DOCUMENTATION ONLY (OUTPATIENT)
Dept: REHABILITATION | Facility: HOSPITAL | Age: 39
End: 2024-11-04
Payer: MEDICAID

## 2024-11-04 NOTE — PROGRESS NOTES
Patient cancelled appointment day-of due to reports of ongoing symptoms of nausea. Message left with patient access representative.    Jessica Mon, PT, DPT

## 2024-11-08 ENCOUNTER — CLINICAL SUPPORT (OUTPATIENT)
Dept: REHABILITATION | Facility: HOSPITAL | Age: 39
End: 2024-11-08
Payer: COMMERCIAL

## 2024-11-08 DIAGNOSIS — G44.309 HEADACHE DUE TO OLD CONCUSSION: Primary | ICD-10-CM

## 2024-11-08 DIAGNOSIS — H55.81 SACCADIC EYE MOVEMENT DEFICIENCY: ICD-10-CM

## 2024-11-08 DIAGNOSIS — M54.2 NECK PAIN: ICD-10-CM

## 2024-11-08 DIAGNOSIS — S06.0XAS HEADACHE DUE TO OLD CONCUSSION: Primary | ICD-10-CM

## 2024-11-08 DIAGNOSIS — R42 DIZZINESS: ICD-10-CM

## 2024-11-08 DIAGNOSIS — R68.89 DECREASED ACTIVITY TOLERANCE: ICD-10-CM

## 2024-11-08 PROCEDURE — 97112 NEUROMUSCULAR REEDUCATION: CPT | Mod: PN,CQ

## 2024-11-08 PROCEDURE — 97110 THERAPEUTIC EXERCISES: CPT | Mod: PN,CQ

## 2024-11-08 NOTE — PROGRESS NOTES
[facility]   Physical Therapy Treatment Note      Name: Lindsay Riososito  Clinic Number: [id number]    Therapy Diagnosis:   Encounter Diagnoses   Name Primary?    Headache due to old concussion Yes    Dizziness     Decreased activity tolerance     Neck pain     Saccadic eye movement deficiency      Physician: Vignesh Burns MD    Visit Date: 11/8/2024    Physician Orders: PT Eval and Treat   Medical Diagnosis from Referral: Post concussion syndrome [F07.81]   Evaluation Date: 10/11/2024  Authorization Period Expiration: 9/26/2025  Plan of Care Expiration: 11/22/2024  Visit # / Visits authorized: /12 (+eval)     Foto  Date  Score    #1/3 10/11/2024 46%   #2/3       #3/3          Time In: 1:05 PM   Time Out: 1:45 PM  Total Appointment Time (timed & untimed codes): 40 minutes (1 TE, 2 NMR)     Precautions: Standard and Fall    PTA Visit #: 2/5     Subjective     Patient reports: Says that her dizziness has improved but that she is unable to stand to complete job duties.  She  will be given home exercise program today.      Response to previous treatment: all night vomiting and 48 hours dizziness   Functional change: ongoing    Pain: 9/10  10/10 at worst  Location:  neck, left arm, right arm, back, legs      Objective      Objective Measures updated at progress report unless specified.     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      neuromuscular re-education activities to improve: Balance and Coordination for 37 minutes. The following activities were included:    Oculomotor exercises: performed seated in corner with target on wall.  Patient was unable to  tolerate full 30 second trials and could not go faster than 50 bpm paced on metronome.    VOR horizontal - 3 x 10-15s - nausea and dizziness  10/10  VOR vertical - 3 x 10-15s - nausea and dizziness  10/10  Saccades horizontal  3x 10-15s - nausea and dizziness 10/10  Saccades vertical - 3 x 10-15s - nausea and dizziness 10/10  Smooth pursuits vertical and horizontal 3 x 10-15s ea- headache and dizziness 10/10    Pencil push ups x 5 reps, 10s holds - headache, nausea and dizziness       Patient Education and Home Exercises       Education provided:   - 10/21 posture/ergonomics at work, oculomotor home exercise program     Written Home Exercises Provided: Yes. Exercises were reviewed and Lindsay was able to demonstrate them prior to the end of the session.  Lindsay demonstrated good  understanding of the education provided. See Electronic Medical Record under Patient Instructions for exercises provided during therapy sessions    Assessment     Lindsay arrived to clinic with high levels of all over body pain and new complaints of right arms numbness and weakness. She reports 9/10 subjective resting dizziness and has been unable to complete VOR/oculomotor exercises at home due to poor tolerance.  Again, poor tolerance of session with patient unable to tolerate more than 10-15 seconds at a time of any exercise and then requiring extended seated rest breaks for recovery to baseline.  She may benefit from continued PT services if she is able to complete daily HEP for better tolerance of sessions.     Lindsay Is progressing well towards her goals.   Patient prognosis is Fair.     Patient will continue to benefit from skilled outpatient physical therapy to address the deficits listed in the problem list box on initial evaluation, provide pt/family education and to maximize pt's level of independence in the home and community environment.     Patient's spiritual, cultural and educational needs considered and pt agreeable to plan  of care and goals.     Anticipated barriers to physical therapy: transportation, non-work related co-morbidities, chronicity of current injury, fear of re-injury, objective abilities vs. self-perceived abilities, and self-limiting behaviors due to pain    Goals: Goals:   Short Terms Goals: 3 weeks   Patient will be 100% independent with HEP in order to maximize physical therapy benefits  Patient will score >/=56% on FOTO survey in order to improve general activity tolerance and improve overall quality of life   Patient will complete >/=20 seconds of saccade activity at >/=100 bpm in order to improve oculomotor function for reading tasks  Patient will complete >/=20 seconds of VOR activity at >/=105 bpm in order to improve vestibular function for fixation tasks  Patient will improve cervical active range of motion in limited planes by >/=5 degrees in order to improve functional mobility      Long Term Goals: 6 weeks   Patient will score >/=66% on FOTO survey in order to improve general activity tolerance and improve overall quality of life   Patient will complete >/=20 seconds of saccade activity at >/=115 bpm in order to improve oculomotor function for reading tasks  Patient will complete >/=20 seconds of VOR activity at >/=125 bpm in order to improve vestibular function for fixation tasks  Patient will improve cervical active range of motion in limited planes by >/=10 degrees in order to improve functional mobility   Patient will report subjective ratings in VOMS assessment (dizziness, headache, nausea, and fogginess) as </=5/10 in all domains in order to improve general activity tolerance    Plan     Continue oculomotor exercises as primary focus, with balance and motion tolerance.     Disha Hopson, PTA

## 2024-11-11 ENCOUNTER — CLINICAL SUPPORT (OUTPATIENT)
Dept: REHABILITATION | Facility: HOSPITAL | Age: 39
End: 2024-11-11
Payer: COMMERCIAL

## 2024-11-11 ENCOUNTER — DOCUMENTATION ONLY (OUTPATIENT)
Dept: REHABILITATION | Facility: HOSPITAL | Age: 39
End: 2024-11-11
Payer: MEDICAID

## 2024-11-11 DIAGNOSIS — M54.2 NECK PAIN: ICD-10-CM

## 2024-11-11 DIAGNOSIS — S06.0XAS HEADACHE DUE TO OLD CONCUSSION: Primary | ICD-10-CM

## 2024-11-11 DIAGNOSIS — G44.309 HEADACHE DUE TO OLD CONCUSSION: Primary | ICD-10-CM

## 2024-11-11 DIAGNOSIS — R68.89 DECREASED ACTIVITY TOLERANCE: ICD-10-CM

## 2024-11-11 DIAGNOSIS — H55.81 SACCADIC EYE MOVEMENT DEFICIENCY: ICD-10-CM

## 2024-11-11 DIAGNOSIS — R42 DIZZINESS: ICD-10-CM

## 2024-11-11 PROCEDURE — 97530 THERAPEUTIC ACTIVITIES: CPT | Mod: PN,CQ

## 2024-11-11 NOTE — PROGRESS NOTES
[facility]   Physical Therapy Treatment Note      Name: Lindsay Riososito  Clinic Number: [id number]    Therapy Diagnosis:   Encounter Diagnoses   Name Primary?    Headache due to old concussion Yes    Dizziness     Decreased activity tolerance     Neck pain     Saccadic eye movement deficiency      Physician: Vignesh Burns MD    Visit Date: 11/11/2024    Physician Orders: PT Eval and Treat   Medical Diagnosis from Referral: Post concussion syndrome [F07.81]   Evaluation Date: 10/11/2024  Authorization Period Expiration: 9/26/2025  Plan of Care Expiration: 11/22/2024  Visit # / Visits authorized: 5/12 (+eval)     Foto  Date  Score    #1/3 10/11/2024 46%   #2/3       #3/3          Time In: 10:30 AM   Time Out: 10:45 AM  Total Appointment Time (timed & untimed codes): 15 minutes (1 TA)     Precautions: Standard and Fall    PTA Visit #: 3/5     Subjective     Patient reports: Spoke to her adjustor earlier this morning regarding difficulty with work, specifically lack of accommodations and how that is contributing to increased dizziness, headache, and left sided pain.  She reports adjustor saying she does not have to continue therapy and can continue getting weekly payments while taking a break from work so she can heal properly.  Says she is unable to tolerate vestibular training at this time and wants to focus on upcoming neck injections for pain.    She was not compliant with home exercises.  Response to previous treatment: all night vomiting and 48 hours dizziness   Functional change: ongoing  Pain: 9/10  10/10 at worst   7/10 resting dizziness upon arrival  Location:  neck, back, left arm, left leg      Objective      Objective Measures updated at progress report unless specified.     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      neuromuscular re-education activities to improve: Balance and Coordination for 00 minutes. The following activities were included:  Not completed today:  Oculomotor exercises: performed seated in corner with target on wall.  Patient was unable to tolerate full 30 second trials and could not go faster than 50 bpm paced on metronome.    VOR horizontal - 3 x 10-15s - nausea and dizziness  10/10  VOR vertical - 3 x 10-15s - nausea and dizziness  10/10  Saccades horizontal  3x 10-15s - nausea and dizziness 10/10  Saccades vertical - 3 x 10-15s - nausea and dizziness 10/10  Smooth pursuits vertical and horizontal 3 x 10-15s ea- headache and dizziness 10/10    Pencil push ups x 5 reps, 10s holds - headache, nausea and dizziness    Therapeutic activities to improve functional performance for 15 minutes, including:  Foto survey completion  Education regarding importance of continuing daily VOR/oculomotor exercises to improve tolerance  HEP review         Patient Education and Home Exercises       Education provided:   - 10/21 posture/ergonomics at work, oculomotor home exercise program     Written Home Exercises Provided: Yes. Exercises were reviewed and Lindsay was able to demonstrate them prior to the end of the session.  Lindsay demonstrated good  understanding of the education provided. See Electronic Medical Record under Patient Instructions for exercises provided during therapy sessions    Assessment     Lindsay arrived to clinic with high levels of neck, back, and left sided pain as well as high levels of resting dizziness and headache from previous session on Friday.  She reports an inability to tolerate treatment and has opted to end treatment at this time so she can focus on neck and back pain and upcoming pain injections.   Patient has been unable to regularly attend appointments and tolerate basic vestibular HEP which she attributes to outside stressors with life and work.  FOTO survey completed and exercises were verbally reviewed though not performed as she was hesitant to exacerbate her already high levels of dizziness. Refer to doc only from PT of record, Jessica Mon, for further information.    Lindsay Is progressing well towards her goals.   Patient prognosis is Fair.     Patient will continue to benefit from skilled outpatient physical therapy to address the deficits listed in the problem list box on initial evaluation, provide pt/family education and to maximize pt's level of independence in the home and community environment.     Patient's spiritual, cultural and educational needs considered and pt agreeable to plan of care and goals.     Anticipated barriers to physical therapy: transportation, non-work related co-morbidities, chronicity of current injury, fear of re-injury, objective abilities vs. self-perceived abilities, and self-limiting behaviors due to pain    Goals: Goals:   Short Terms Goals: 3 weeks   Patient will be 100% independent with HEP in order to maximize physical therapy benefits  Patient will score >/=56% on FOTO survey in order to improve general activity tolerance and improve overall quality of life   Patient will complete >/=20 seconds of saccade activity at >/=100 bpm in order to improve oculomotor function for reading tasks  Patient will complete >/=20 seconds of VOR activity at >/=105 bpm in order to improve vestibular function for fixation tasks  Patient will improve cervical active range of motion in limited planes by >/=5 degrees in order to improve functional mobility      Long Term Goals: 6 weeks   Patient will score >/=66% on FOTO survey in order to improve general activity tolerance and improve overall quality of life   Patient will complete >/=20 seconds of saccade activity at >/=115 bpm  in order to improve oculomotor function for reading tasks  Patient will complete >/=20 seconds of VOR activity at >/=125 bpm in order to improve vestibular function for fixation tasks  Patient will improve cervical active range of motion in limited planes by >/=10 degrees in order to improve functional mobility   Patient will report subjective ratings in VOMS assessment (dizziness, headache, nausea, and fogginess) as </=5/10 in all domains in order to improve general activity tolerance    Plan     Continue oculomotor exercises as primary focus, with balance and motion tolerance.     Disha Hopson, PTA

## 2024-11-12 NOTE — PROGRESS NOTES
Patient elected to self-discharge from neuro/vestibular therapy this morning. Patient was being seen by Disha Hopson PTA during this visit while I was evaluating another patient, so no formal discharge reassessment could be completed. Patient elected to leave clinic after approximately 15 minutes of her session, so I was unable to discuss her self-discharge with her. Please see PTA progress note (10/11) for additional detail of her subjective reasoning for self-discharge.     While patient could not formally be reassessed, medical record review coupled with discussion of her progress with PTA is consistent with heightened and exaggerated response to vestibular/oculomotor intervention and overall poor tolerance of recommended interventions to address her chief complaints. While she was not reassessed, progress toward goals is likely very poor. Patient to be discharged at this time.    Jessica Mon, PT, DPT

## 2024-11-13 ENCOUNTER — OUTPATIENT CASE MANAGEMENT (OUTPATIENT)
Dept: ADMINISTRATIVE | Facility: OTHER | Age: 39
End: 2024-11-13
Payer: MEDICAID

## 2024-11-13 NOTE — PROGRESS NOTES
Outpatient Care Management   - Care Plan Follow Up    Patient: Lindsay Sneed  MRN:  [id number]  Date of Service:  11/13/2024  Completed by:  Karolina Guzman LCSW  Referral Date: 05/10/2024    Reason for Visit   Patient presents with    OPCM SW Follow Up Call       Brief Summary:  spoke with Pt to check in. She reported having to stop therapy due to it making her sick and that work let her go due to owing her money but not wanting to pay her. She is working on what to do next and is attending other appts she has. Once she gets re-cleared, she will try therapy again. Advised her to try the [facility].     Future Appointments   Date Time Provider Department Center   12/5/2024 10:00 AM COLE YOUNG [facility]URO [facility]   1/28/2025  9:00 AM Vignesh Burns MD [facility] NEURO [facility]     Karolina Guzman LCSW  Neuro Therapy   [facility]  [phone]

## 2024-12-03 ENCOUNTER — TELEPHONE (OUTPATIENT)
Dept: NEUROLOGY | Facility: CLINIC | Age: 39
End: 2024-12-03
Payer: MEDICAID

## 2024-12-03 NOTE — TELEPHONE ENCOUNTER
Spoke with Ms. Montoya and advised her that she will receive her EMG results on produre day. Patient stated that was workman's comp that contacted us regarding that and we do not take workman's comp in this facility. Patient was very upset and will contact them. She was already aware that she will receive the results that day.         ----- Message from Anastasia sent at 12/3/2024 11:57 AM CST -----  Regarding: [employer], santiago   Who called: Raegan    What is the request in detail: pt has emg 12/5/24 and is asking will she have the results back before her pain management appt 12/9/24?    Can the clinic reply by [facility]? No    Would the patient rather a call back or a response via [facility]? Call back    Best call back number: [phone] pt      Additional Information: rep is saying results can be faxed to her and she can get it to pain management doc, Dr. Robles. Fax [phone] , phone [phone] Raegan Ledesma is saying you can call if need any medical records from 's office     Thank you.

## 2024-12-05 ENCOUNTER — PROCEDURE VISIT (OUTPATIENT)
Dept: NEUROLOGY | Facility: CLINIC | Age: 39
End: 2024-12-05
Payer: MEDICAID

## 2024-12-05 DIAGNOSIS — M54.2 CERVICALGIA: ICD-10-CM

## 2024-12-05 PROCEDURE — 95886 MUSC TEST DONE W/N TEST COMP: CPT | Mod: PBBFAC | Performed by: STUDENT IN AN ORGANIZED HEALTH CARE EDUCATION/TRAINING PROGRAM

## 2024-12-05 PROCEDURE — 95911 NRV CNDJ TEST 9-10 STUDIES: CPT | Mod: PBBFAC | Performed by: STUDENT IN AN ORGANIZED HEALTH CARE EDUCATION/TRAINING PROGRAM
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
## 2024-12-27 ENCOUNTER — HOSPITAL ENCOUNTER (EMERGENCY)
Facility: HOSPITAL | Age: 39
Discharge: HOME | End: 2024-12-27
Attending: EMERGENCY MEDICINE
Payer: MEDICAID

## 2024-12-27 VITALS
DIASTOLIC BLOOD PRESSURE: 67 MMHG | OXYGEN SATURATION: 99 % | TEMPERATURE: 99 F | HEIGHT: 66 IN | SYSTOLIC BLOOD PRESSURE: 114 MMHG | HEART RATE: 86 BPM | RESPIRATION RATE: 20 BRPM | WEIGHT: 215 LBS | BODY MASS INDEX: 34.55 KG/M2

## 2024-12-27 DIAGNOSIS — S46.911A RIGHT SHOULDER STRAIN, INITIAL ENCOUNTER: Primary | ICD-10-CM

## 2024-12-27 PROCEDURE — 96372 THER/PROPH/DIAG INJ SC/IM: CPT | Performed by: EMERGENCY MEDICINE

## 2024-12-27 PROCEDURE — 99284 EMERGENCY DEPT VISIT MOD MDM: CPT | Mod: 25,ER

## 2024-12-27 PROCEDURE — 63600175 PHARM REV CODE 636 W HCPCS: Mod: ER | Performed by: EMERGENCY MEDICINE

## 2024-12-27 RX ORDER — METHOCARBAMOL 500 MG/1
1000 TABLET, FILM COATED ORAL 3 TIMES DAILY
Qty: 30 TABLET | Refills: 0 | Status: SHIPPED | OUTPATIENT
Start: 2024-12-27 | End: 2025-01-01

## 2024-12-27 RX ORDER — METHOCARBAMOL 500 MG/1
1000 TABLET, FILM COATED ORAL 3 TIMES DAILY
Qty: 30 TABLET | Refills: 0 | Status: SHIPPED | OUTPATIENT
Start: 2024-12-27 | End: 2024-12-27

## 2024-12-27 RX ORDER — ORPHENADRINE CITRATE 30 MG/ML
60 INJECTION INTRAMUSCULAR; INTRAVENOUS
Status: COMPLETED | OUTPATIENT
Start: 2024-12-27 | End: 2024-12-27

## 2024-12-27 RX ORDER — KETOROLAC TROMETHAMINE 10 MG/1
TABLET, FILM COATED ORAL
Qty: 15 TABLET | Refills: 0 | Status: SHIPPED | OUTPATIENT
Start: 2024-12-27

## 2024-12-27 RX ORDER — KETOROLAC TROMETHAMINE 30 MG/ML
30 INJECTION, SOLUTION INTRAMUSCULAR; INTRAVENOUS
Status: COMPLETED | OUTPATIENT
Start: 2024-12-27 | End: 2024-12-27

## 2024-12-27 RX ORDER — KETOROLAC TROMETHAMINE 10 MG/1
TABLET, FILM COATED ORAL
Qty: 15 TABLET | Refills: 0 | Status: SHIPPED | OUTPATIENT
Start: 2024-12-27 | End: 2024-12-27

## 2024-12-27 RX ADMIN — KETOROLAC TROMETHAMINE 30 MG: 30 INJECTION, SOLUTION INTRAMUSCULAR; INTRAVENOUS at 01:12

## 2024-12-27 RX ADMIN — ORPHENADRINE CITRATE 60 MG: 30 INJECTION, SOLUTION INTRAMUSCULAR; INTRAVENOUS at 01:12

## 2024-12-27 NOTE — Clinical Note
"Lindsay"Donna Sneed was seen and treated in our emergency department on 12/27/2024.  She may return to work on 12/30/2024.       If you have any questions or concerns, please don't hesitate to call.      Sammie Black MD"
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
## 2024-12-27 NOTE — DISCHARGE INSTRUCTIONS
Take medications as directed. Try to gently stretch area 2-3 times daily. Follow up with your doctor for any further concerns.
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
## 2024-12-30 ENCOUNTER — OUTPATIENT CASE MANAGEMENT (OUTPATIENT)
Dept: ADMINISTRATIVE | Facility: OTHER | Age: 39
End: 2024-12-30
Payer: MEDICAID
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
## 2025-01-15 ENCOUNTER — OUTPATIENT CASE MANAGEMENT (OUTPATIENT)
Dept: ADMINISTRATIVE | Facility: OTHER | Age: 40
End: 2025-01-15
Payer: MEDICAID
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
## 2025-01-16 ENCOUNTER — TELEPHONE (OUTPATIENT)
Dept: ORTHOPEDICS | Facility: CLINIC | Age: 40
End: 2025-01-16
Payer: MEDICAID

## 2025-01-16 ENCOUNTER — OFFICE VISIT (OUTPATIENT)
Dept: URGENT CARE | Facility: CLINIC | Age: 40
End: 2025-01-16
Payer: COMMERCIAL

## 2025-01-16 VITALS
OXYGEN SATURATION: 95 % | HEART RATE: 78 BPM | SYSTOLIC BLOOD PRESSURE: 104 MMHG | BODY MASS INDEX: 34.55 KG/M2 | TEMPERATURE: 98 F | DIASTOLIC BLOOD PRESSURE: 71 MMHG | HEIGHT: 66 IN | WEIGHT: 215 LBS | RESPIRATION RATE: 20 BRPM

## 2025-01-16 DIAGNOSIS — S49.91XD RIGHT SHOULDER INJURY, SUBSEQUENT ENCOUNTER: ICD-10-CM
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
## 2025-01-16 DIAGNOSIS — M67.911 TENDINOPATHY OF RIGHT SHOULDER: Primary | ICD-10-CM

## 2025-01-16 PROCEDURE — 99213 OFFICE O/P EST LOW 20 MIN: CPT | Mod: S$GLB,,,

## 2025-01-16 RX ORDER — TIZANIDINE 4 MG/1
4 TABLET ORAL EVERY 6 HOURS PRN
Qty: 20 TABLET | Refills: 0 | Status: SHIPPED | OUTPATIENT
Start: 2025-01-16 | End: 2025-01-16

## 2025-01-16 RX ORDER — LIDOCAINE 50 MG/G
1 PATCH TOPICAL DAILY
Qty: 14 PATCH | Refills: 0 | Status: SHIPPED | OUTPATIENT
Start: 2025-01-16 | End: 2025-01-16

## 2025-01-16 RX ORDER — TIZANIDINE 4 MG/1
4 TABLET ORAL EVERY 6 HOURS PRN
Qty: 20 TABLET | Refills: 0 | Status: SHIPPED | OUTPATIENT
Start: 2025-01-16

## 2025-01-16 RX ORDER — MELOXICAM 15 MG/1
15 TABLET ORAL DAILY
Qty: 30 TABLET | Refills: 0 | Status: SHIPPED | OUTPATIENT
Start: 2025-01-16 | End: 2025-01-16

## 2025-01-16 RX ORDER — METHYLPREDNISOLONE 4 MG/1
TABLET ORAL
Qty: 21 EACH | Refills: 0 | Status: SHIPPED | OUTPATIENT
Start: 2025-01-16 | End: 2025-01-16

## 2025-01-16 RX ORDER — LIDOCAINE 50 MG/G
1 PATCH TOPICAL DAILY
Qty: 14 PATCH | Refills: 0 | Status: SHIPPED | OUTPATIENT
Start: 2025-01-16

## 2025-01-16 RX ORDER — METHYLPREDNISOLONE 4 MG/1
TABLET ORAL
Qty: 21 EACH | Refills: 0 | Status: SHIPPED | OUTPATIENT
Start: 2025-01-16 | End: 2025-02-06

## 2025-01-16 RX ORDER — MELOXICAM 15 MG/1
15 TABLET ORAL DAILY
Qty: 30 TABLET | Refills: 0 | Status: SHIPPED | OUTPATIENT
Start: 2025-01-16

## 2025-01-16 NOTE — PATIENT INSTRUCTIONS
Pain Control  Lidocaine patch daily. Please wear over painful area for maximum of 12 hours daily.  Meloxicam 15 mg tablet once daily in the morning for pain. This is an NSAID, do not take with other NSAIDs such as ibuprofen, naproxen, aspirin, ketorolac.   Xanaflex 4 mg tablet every 6 hours as needed for pain and muscle spasms. This medication can cause drowsiness, please use caution while taking.   Methylprednisolone as prescribed. This is a steroid medication to help reduce pain and inflammation.  Discussed adverse side effects of recurrent/long term oral steroid use: elevated blood pressure elevated blood sugar, pancreatitis,glaucoma/cataracts, weight gain in face/abdomen/neck, round face (moon face), fluid retention in legs/lungs, mood swings, upset stomach, increased risk of infections, osteoporosis and increased risk of fractures, fatigue, loss of appetite, nausea and muscle weakness, thin skin, bruising and slower wound healing.     Follow up  A referral has been placed for you to be seen with orthopedics. You should receive a call from [facility] within the next 1-3 days to set up an appointment. If you do not receive a call, you may call our Referral Hotline at [phone] to make an appointment.    Should you develop any worsening or new symptoms after leaving urgent care, it is recommended that you go to the ER for further/repeat evaluation.      Follow up with your PCP in 3-5 days after your urgent care visit.     Please remember that you have received care at an urgent care today. Urgent cares are not emergency rooms and are not equipped to handle life threatening emergencies and cannot rule in or out certain medical conditions and you may be released before all of your medical problems are known or treated, please schedule all follow up appointments as discussed and if you have worsening symptoms please go to the ER to rule out potential life threatening problems, as discussed.
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
## 2025-02-13 ENCOUNTER — OUTPATIENT CASE MANAGEMENT (OUTPATIENT)
Dept: ADMINISTRATIVE | Facility: OTHER | Age: 40
End: 2025-02-13
Payer: MEDICAID
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
## 2025-04-02 ENCOUNTER — TELEPHONE (OUTPATIENT)
Dept: OBSTETRICS AND GYNECOLOGY | Facility: CLINIC | Age: 40
End: 2025-04-02
Payer: MEDICAID

## 2025-04-02 ENCOUNTER — OCHSNER VIRTUAL EMERGENCY DEPARTMENT (OUTPATIENT)
Facility: CLINIC | Age: 40
End: 2025-04-02
Payer: MEDICAID

## 2025-04-02 ENCOUNTER — PATIENT OUTREACH (OUTPATIENT)
Facility: OTHER | Age: 40
End: 2025-04-02
Payer: MEDICAID

## 2025-04-02 ENCOUNTER — NURSE TRIAGE (OUTPATIENT)
Dept: ADMINISTRATIVE | Facility: CLINIC | Age: 40
End: 2025-04-02
Payer: MEDICAID

## 2025-04-02 DIAGNOSIS — G44.329 CHRONIC POST-CONCUSSION HEADACHE: Primary | ICD-10-CM

## 2025-04-02 NOTE — PLAN OF CARE-OVED
[facility] Plan of Care Note  Referral Source: Nurse On-Call                               Chief Complaint   Patient presents with    Headache     Chronic post-concussive headache. Ran out of tramadol, looking for PCP appt        Recommendation: Primary Care                       Recommendation comment: no new sxs. can wait for PCP. tramadol not best for headache treatment, may have w/d headaches in addition to post concussive HA    Encounter Diagnosis   Name Primary?    Chronic post-concussion headache Yes

## 2025-04-02 NOTE — PROGRESS NOTES
"Patient called the [facility] RN on 4/2/25 for c/o "intermittent headaches that began after head injury last year. Pt reports current pain of 7/10. Pt reports she usually takes Tramadol for pain but ran out 1 month ago". OOC RN consult Marcie.     Marcie Provider Dr Yesenia Varela disposition recommendation Primary Care                    "Recommendation comment: no new sxs. can wait for PCP. tramadol not best for headache treatment, may have w/d headaches in addition to post concussive HA."    Appointment scheduled for 4/7/2025 at 1:20 PM with Elkin Posadas MD at [address] [place] [address].    Appointment reminder scheduled for 4/4/25.    Follow up scheduled for 4/8/25 to assess for additional needs.      "

## 2025-04-02 NOTE — TELEPHONE ENCOUNTER
Pt reports she is trying to get an appointment with her PCP for her annual visit. Pt reports intermittent headaches that began after head injury last year. Pt reports current pain of 7/10. Pt reports she usually takes Tramadol for pain but ran out 1 month ago. Care advice to be seen today or tomorrow in office. No available appointments. Secure chat sent to Marcie. MD Avery verbalized pt can see PCP on 4/7/25. Appointment scheduled by LPN. Pt made aware and verbalizes understanding.   Reason for Disposition   MODERATE headache (e.g., interferes with normal activities) present > 24 hours and unexplained    Additional Information   Negative: Difficult to awaken or acting confused (e.g., disoriented, slurred speech)   Negative: Weakness of the face, arm or leg on one side of the body and new-onset   Negative: Numbness of the face, arm or leg on one side of the body and new-onset   Negative: Loss of speech or garbled speech and new-onset   Negative: Passed out (e.g., fainted, lost consciousness, blacked out and was not responding)   Negative: Sounds like a life-threatening emergency to the triager   Negative: Unable to walk without falling   Negative: Stiff neck (can't touch chin to chest)   Negative: Other family members (or people in same household) with headaches and possibility of carbon monoxide exposure   Negative: SEVERE headache, states 'worst headache' of life   Negative: SEVERE headache, sudden-onset (i.e., reaching maximum intensity within seconds to 1 hour)   Negative: Severe pain in one eye   Negative: Loss of vision or double vision (Exception: Same as previously diagnosed migraines.)   Negative: Patient sounds very sick or weak to the triager   Negative: Fever > 103 F (39.4 C)   Negative: Fever > 100 F (37.8 C) and has diabetes mellitus or a weak immune system (e.g., HIV positive, cancer chemotherapy, organ transplant, splenectomy, chronic steroids)   Negative: SEVERE headache (e.g., excruciating) and has  had severe headaches before   Negative: SEVERE headache and not relieved by pain meds   Negative: SEVERE headache and vomiting   Negative: SEVERE headache and fever   Negative: New-onset headache and weak immune system (e.g., HIV positive, cancer chemo, splenectomy, organ transplant, chronic steroids)   Negative: Fever present > 3 days (72 hours)   Negative: Patient wants to be seen    Protocols used: Headache-A-[place]
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
## 2025-04-04 ENCOUNTER — PATIENT OUTREACH (OUTPATIENT)
Facility: OTHER | Age: 40
End: 2025-04-04
Payer: MEDICAID

## 2025-04-04 NOTE — PROGRESS NOTES
Successfully contacted patient to confirm appointment for 4/7/2025 at 1:20 PM with Elkin Posadas MD at [address] [place] [address].  Follow up scheduled for 4/8/25 to assess for additional needs.

## 2025-04-07 ENCOUNTER — OFFICE VISIT (OUTPATIENT)
Dept: PRIMARY CARE CLINIC | Facility: CLINIC | Age: 40
End: 2025-04-07
Payer: MEDICAID

## 2025-04-07 VITALS
HEART RATE: 69 BPM | BODY MASS INDEX: 35.72 KG/M2 | DIASTOLIC BLOOD PRESSURE: 79 MMHG | HEIGHT: 66 IN | WEIGHT: 222.25 LBS | SYSTOLIC BLOOD PRESSURE: 114 MMHG | OXYGEN SATURATION: 98 % | RESPIRATION RATE: 18 BRPM

## 2025-04-07 DIAGNOSIS — M54.2 CERVICALGIA: ICD-10-CM

## 2025-04-07 DIAGNOSIS — E66.9 OBESITY, UNSPECIFIED CLASS, UNSPECIFIED OBESITY TYPE, UNSPECIFIED WHETHER SERIOUS COMORBIDITY PRESENT: ICD-10-CM

## 2025-04-07 DIAGNOSIS — F07.81 POST CONCUSSION SYNDROME: Primary | ICD-10-CM

## 2025-04-07 PROCEDURE — 99213 OFFICE O/P EST LOW 20 MIN: CPT | Mod: PBBFAC,PN

## 2025-04-07 PROCEDURE — 99999 PR PBB SHADOW E&M-EST. PATIENT-LVL III: CPT | Mod: PBBFAC,,,

## 2025-04-07 NOTE — PROGRESS NOTES
"[facility]    Subjective:     Lindsay Sneed is a 40 y.o. year old female with PMHx of post concussive syndrome who presents to clinic for     Headaches:  - States follows with Neurology and Pain Management  - Per patient, Pain management did neck injections, didn't work, then switched to Tramadol and "muscle relaxer"  - No medication for about 1 month   - Located in same spot    Left hand and leg numbness:  - Ongoing since head injury, feels it worsened about 4 months after  - Last seen neurology in January, no appointment coming up  - Patient had MRI done with Neurology also had EMG which was unremarkable per documentation     Weight:  - States she was previously borderline diabetic, states was previously on metformin  - Last A1c in current chart was 4.9, no diabetic medications noted in chart    Patient Active Problem List    Diagnosis Date Noted    Dizziness 10/13/2024    Decreased activity tolerance 10/13/2024    Cervicalgia 10/13/2024    Saccadic eye movement deficiency 10/13/2024    Cognitive communication deficit 2024    Pain 2024    Impaired vision 2024    Daily nausea 2024    Nonspecific dizziness 2024    Headache due to old concussion 05/10/2024    Post concussion syndrome 04/15/2024        Review of patient's allergies indicates:   Allergen Reactions    Adhesive     Bactrim [sulfamethoxazole-trimethoprim]         No past medical history on file.   Past Surgical History:   Procedure Laterality Date    BLADDER SURGERY      due to fistula     SECTION      HYSTERECTOMY      TUBAL LIGATION        Family History   Problem Relation Name Age of Onset    Breast cancer Maternal Grandmother      Breast cancer Other Great Aunt       Social History     Tobacco Use    Smoking status: Former     Types: Cigarettes    Smokeless tobacco: Never   Substance Use Topics    Alcohol use: Never      Review of Systems   Neurological:  Positive for headaches.     Objective: " "    Vitals:    04/07/25 1330   BP: 114/79   BP Location: Right arm   Patient Position: Sitting   Pulse: 69   Resp: 18   SpO2: 98%   Weight: 100.8 kg (222 lb 3.6 oz)   Height: 5' 6" (1.676 m)     Physical Exam  Constitutional:       General: She is not in acute distress.     Appearance: She is not ill-appearing or toxic-appearing.   HENT:      Head: Normocephalic.      Right Ear: External ear normal.      Left Ear: External ear normal.      Nose: Nose normal.   Eyes:      Extraocular Movements: Extraocular movements intact.   Cardiovascular:      Rate and Rhythm: Normal rate.   Pulmonary:      Effort: Pulmonary effort is normal. No respiratory distress.   Musculoskeletal:         General: Normal range of motion.   Skin:     General: Skin is dry.   Neurological:      Mental Status: She is alert.      Gait: Gait normal.       Assessment/Plan:     Lindsay Sneed is a 40 y.o. year old female who presents to clinic for     1. Post concussion syndrome (Primary)  - Chronic, uncontrolled  - Discussed with patient that Tramadol and muscle relaxants are not first line therapy for headaches. We discussed that it would be best for the patient to follow up with Pain Management (provided referral given previous doctor does not accept current insurance) so that she can establish with them. Patient was offered alternative therapies including over the counter medications and resumption of her previously prescribed Nortriptyline, however she declined these medications. Recommend that patient follow up with Neurology as well as she was being previously managed for her uncontrolled headaches and may require more advanced interventions    - Ambulatory referral/consult to Pain Clinic; Future    2. Obesity, unspecified class, unspecified obesity type, unspecified whether serious comorbidity present  - Chronic, uncontrolled  - Provided patient with referral for Bariatric Surgery for evaluation and treatment  - Encourage healthy diet and " lifestyle     - Ambulatory referral/consult to Bariatric Surgery; Future    3. Cervicalgia  - Chronic, uncontrolled  - Patient previously followed with Neurology, however per chart review has not been in some time.  - Previously had EMG done for this and per Neurology documentation, test was unremarkable  - Patient declined Nortriptyline today  - May be beneficial for patient to follow up with Neurology and possibly Neurosurgery in the future.    ________________________  Elkin Posadas Jr, MD  [facility] PGY-2

## 2025-04-08 ENCOUNTER — PATIENT OUTREACH (OUTPATIENT)
Facility: OTHER | Age: 40
End: 2025-04-08
Payer: MEDICAID

## 2025-04-08 NOTE — PROGRESS NOTES
Chart review indicates that the patient completed a primary care appointment on 4/7/25. A follow-up call was made to assess for additional needs. The patient shared that Katharina is a resident and unable to write prescriptions for all of her needs, so she has scheduled an appointment with Dr. Alan for tomorrow. She reported that, overall, she is doing well and has no additional concerns at this time.

## 2025-04-08 NOTE — PROGRESS NOTES
I assume primary medical responsibility for this patient. I have reviewed the history, physical, and assessement & treatment plan with the resident and agree that the care is reasonable and necessary. This service has been performed by a resident without the presence of a teaching physician under the primary care exception. If necessary, an addendum of additional findings or evaluation beyond the resident documentation will be noted below.    Agree with plan for referral to pain and neurology and avoidance of chronic opioid admin. HA are chronic with no red flags.

## 2025-04-09 ENCOUNTER — OFFICE VISIT (OUTPATIENT)
Dept: PRIMARY CARE CLINIC | Facility: CLINIC | Age: 40
End: 2025-04-09
Payer: MEDICAID

## 2025-04-09 ENCOUNTER — PATIENT OUTREACH (OUTPATIENT)
Dept: ADMINISTRATIVE | Facility: OTHER | Age: 40
End: 2025-04-09
Payer: MEDICAID

## 2025-04-09 VITALS
SYSTOLIC BLOOD PRESSURE: 100 MMHG | BODY MASS INDEX: 35.61 KG/M2 | WEIGHT: 221.56 LBS | HEART RATE: 78 BPM | DIASTOLIC BLOOD PRESSURE: 78 MMHG | OXYGEN SATURATION: 95 % | RESPIRATION RATE: 16 BRPM | HEIGHT: 66 IN

## 2025-04-09 DIAGNOSIS — F41.8 DEPRESSION WITH ANXIETY: ICD-10-CM

## 2025-04-09 DIAGNOSIS — M54.2 CERVICALGIA: Primary | ICD-10-CM

## 2025-04-09 PROCEDURE — 3078F DIAST BP <80 MM HG: CPT | Mod: CPTII,,, | Performed by: FAMILY MEDICINE

## 2025-04-09 PROCEDURE — 3074F SYST BP LT 130 MM HG: CPT | Mod: CPTII,,, | Performed by: FAMILY MEDICINE

## 2025-04-09 PROCEDURE — 99214 OFFICE O/P EST MOD 30 MIN: CPT | Mod: PBBFAC,PN | Performed by: FAMILY MEDICINE

## 2025-04-09 PROCEDURE — 1159F MED LIST DOCD IN RCRD: CPT | Mod: CPTII,,, | Performed by: FAMILY MEDICINE

## 2025-04-09 PROCEDURE — 1160F RVW MEDS BY RX/DR IN RCRD: CPT | Mod: CPTII,,, | Performed by: FAMILY MEDICINE

## 2025-04-09 PROCEDURE — 99213 OFFICE O/P EST LOW 20 MIN: CPT | Mod: S$PBB,,, | Performed by: FAMILY MEDICINE

## 2025-04-09 PROCEDURE — 3008F BODY MASS INDEX DOCD: CPT | Mod: CPTII,,, | Performed by: FAMILY MEDICINE

## 2025-04-09 PROCEDURE — 99999 PR PBB SHADOW E&M-EST. PATIENT-LVL IV: CPT | Mod: PBBFAC,,, | Performed by: FAMILY MEDICINE

## 2025-04-09 RX ORDER — ALPRAZOLAM 0.5 MG/1
TABLET ORAL
COMMUNITY
Start: 2024-11-15

## 2025-04-09 RX ORDER — TRAMADOL HYDROCHLORIDE 50 MG/1
50 TABLET ORAL DAILY PRN
COMMUNITY
Start: 2025-01-28

## 2025-04-09 NOTE — PROGRESS NOTES
CHW - Initial Contact    This Community Health Worker completed  the Social Determinant of Health questionnaire with PATIENT during clinic visit today.    Pt identified barriers of most importance are: PT. States she is homeless at this time. PT states she go over to her father house to take a bath. He lives in an [place] so there is no place for her to live there.   Referrals to community agencies completed with patient/caregiver consent outside of [facility] include:PT referred to [facility] for housing assistance.  Referrals were put through [facility] - : NO  Support and Services:   Other information discussed the patient needs / wants help with: NONE   Follow up required:   Initial Outreach - Due: 4/14/2025
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
## 2025-04-11 ENCOUNTER — HOSPITAL ENCOUNTER (OUTPATIENT)
Dept: RADIOLOGY | Facility: HOSPITAL | Age: 40
Discharge: HOME | End: 2025-04-11
Attending: OBSTETRICS & GYNECOLOGY
Payer: MEDICAID

## 2025-04-11 ENCOUNTER — OFFICE VISIT (OUTPATIENT)
Dept: OBSTETRICS AND GYNECOLOGY | Facility: CLINIC | Age: 40
End: 2025-04-11
Payer: MEDICAID

## 2025-04-11 VITALS
BODY MASS INDEX: 35.81 KG/M2 | WEIGHT: 221.88 LBS | HEART RATE: 70 BPM | SYSTOLIC BLOOD PRESSURE: 102 MMHG | DIASTOLIC BLOOD PRESSURE: 72 MMHG

## 2025-04-11 DIAGNOSIS — Z01.419 WELL WOMAN EXAM WITH ROUTINE GYNECOLOGICAL EXAM: ICD-10-CM

## 2025-04-11 DIAGNOSIS — Z01.419 WELL WOMAN EXAM WITH ROUTINE GYNECOLOGICAL EXAM: Primary | ICD-10-CM

## 2025-04-11 PROCEDURE — 77067 SCR MAMMO BI INCL CAD: CPT | Mod: TC

## 2025-04-11 PROCEDURE — 99999 PR PBB SHADOW E&M-EST. PATIENT-LVL III: CPT | Mod: PBBFAC,,, | Performed by: OBSTETRICS & GYNECOLOGY

## 2025-04-11 PROCEDURE — 99213 OFFICE O/P EST LOW 20 MIN: CPT | Mod: PBBFAC,PO | Performed by: OBSTETRICS & GYNECOLOGY

## 2025-04-11 PROCEDURE — 77067 SCR MAMMO BI INCL CAD: CPT | Mod: 26,,, | Performed by: RADIOLOGY

## 2025-04-11 PROCEDURE — 77063 BREAST TOMOSYNTHESIS BI: CPT | Mod: 26,,, | Performed by: RADIOLOGY

## 2025-04-11 RX ORDER — ALPRAZOLAM 0.5 MG/1
0.5 TABLET ORAL NIGHTLY PRN
Qty: 30 TABLET | Refills: 0 | Status: SHIPPED | OUTPATIENT
Start: 2025-04-11 | End: 2026-04-11

## 2025-04-11 RX ORDER — DIETHYLPROPION HYDROCHLORIDE 75 MG/1
75 TABLET, EXTENDED RELEASE ORAL EVERY MORNING
Qty: 30 TABLET | Refills: 1 | Status: SHIPPED | OUTPATIENT
Start: 2025-04-11

## 2025-04-11 NOTE — PROGRESS NOTES
HPI:  40 y.o.   OB History          2    Para   2    Term   2            AB        Living   2         SAB        IAB        Ectopic        Multiple        Live Births                  No LMP recorded. Patient has had a hysterectomy.   Patient here for her annual gynecologic exam.  She has no complaints at this time.    ROS:  GENERAL: No fever, chills, fatigability or weight loss.  SKIN: No rashes, itching or changes in color or texture of skin.  HEAD: No headaches or recent head trauma.  EYES: Visual acuity fine. No photophobia, ocular pain or diplopia.  EARS: Denies ear pain, discharge or vertigo.  NOSE: No loss of smell, no epistaxis or postnasal drip.  MOUTH & THROAT: No hoarseness or change in voice. No excessive gum bleeding.  NODES: Denies swollen glands.  CHEST: Denies KIM, cyanosis, wheezing, cough and sputum production.  CARDIOVASCULAR: Denies chest pain, PND, orthopnea or reduced exercise tolerance.  ABDOMEN: Appetite fine. No weight loss. Denies diarrhea, abdominal pain, hematemesis or blood in stool.  URINARY: No flank pain, dysuria or hematuria.  PERIPHERAL VASCULAR: No claudication or cyanosis.  MUSCULOSKELETAL: No joint stiffness or swelling. Denies back pain.  NEUROLOGIC: No history of seizures, paralysis, alteration of gait or coordination.    PE:   /72 (Patient Position: Sitting)   Pulse 70   Wt 100.7 kg (221 lb 14.3 oz)   BMI 35.81 kg/m²   APPEARANCE: Well nourished, well developed, in no acute distress.  NECK: Neck symmetric without masses or thyromegaly.  NODES: No inguinal lymph node enlargement.  ABDOMEN: Soft. No tenderness or masses. No hepatosplenomegaly. No hernias.  BREASTS: Symmetrical, no skin changes or visible lesions. No palpable masses, nipple discharge or adenopathy bilaterally.  PELVIC: Normal external female genitalia without lesions. Normal hair distribution. Adequate perineal body, normal urethral meatus. Vagina moist and well rugated without lesions or  discharge. No significant cystocele or rectocele. Uterus and cervix surgically absent. Bimanual exam revealed no masses, tenderness or abnormality.    Procedure:  Pap Smear    Assessment:  Normal Gynecologic Exam    Plan:  Mammogram and Colonoscopy as per current recommendations.   Return to clinic in one year or for any problems or complaints.  One ovary in  Discussed wt loss issues  States has pcos  Counselled on wt loss, told can give only 2 mo diet pills  Pt wants xanax, states her primary wouldn't give, I stressed I can give one rx

## 2025-04-13 ENCOUNTER — PATIENT MESSAGE (OUTPATIENT)
Dept: OBSTETRICS AND GYNECOLOGY | Facility: CLINIC | Age: 40
End: 2025-04-13
Payer: MEDICAID

## 2025-04-28 ENCOUNTER — RESULTS FOLLOW-UP (OUTPATIENT)
Dept: OBSTETRICS AND GYNECOLOGY | Facility: CLINIC | Age: 40
End: 2025-04-28

## 2025-05-19 ENCOUNTER — PATIENT OUTREACH (OUTPATIENT)
Dept: ADMINISTRATIVE | Facility: OTHER | Age: 40
End: 2025-05-19
Payer: MEDICAID

## 2025-06-30 ENCOUNTER — ON-DEMAND VIRTUAL (OUTPATIENT)
Dept: URGENT CARE | Facility: CLINIC | Age: 40
End: 2025-06-30
Payer: MEDICAID

## 2025-06-30 DIAGNOSIS — B02.9 HERPES ZOSTER WITHOUT COMPLICATION: Primary | ICD-10-CM

## 2025-06-30 PROCEDURE — 98005 SYNCH AUDIO-VIDEO EST LOW 20: CPT | Mod: 95,,, | Performed by: NURSE PRACTITIONER

## 2025-06-30 RX ORDER — VALACYCLOVIR HYDROCHLORIDE 1 G/1
1000 TABLET, FILM COATED ORAL 3 TIMES DAILY
Qty: 21 TABLET | Refills: 0 | Status: SHIPPED | OUTPATIENT
Start: 2025-06-30 | End: 2025-07-07

## 2025-06-30 NOTE — PROGRESS NOTES
Subjective:      Patient ID: Lindsay Sneed is a 40 y.o. female.    Vitals:  vitals were not taken for this visit.     Chief Complaint: Herpes Zoster      Visit Type: TELE AUDIOVISUAL    Patient Location: [place]    Present with the patient at the time of consultation: TELEMED PRESENT WITH PATIENT: None    History reviewed. No pertinent past medical history.  Past Surgical History:   Procedure Laterality Date    BLADDER SURGERY      due to fistula     SECTION      HYSTERECTOMY      TUBAL LIGATION       Review of patient's allergies indicates:   Allergen Reactions    Adhesive     Bactrim [sulfamethoxazole-trimethoprim]      Medications Ordered Prior to Encounter[1]  Family History   Problem Relation Name Age of Onset    Breast cancer Maternal Grandmother      Breast cancer Other Great Aunt        Medications Ordered                [id number] [place] - [address] [facility]   [address] [address]    Telephone: [phone]   Fax: [phone]   Hours: Not open 24 hours                         E-Prescribed (1 of 1)              valACYclovir (VALTREX) 1000 MG tablet    Sig: Take 1 tablet (1,000 mg total) by mouth 3 (three) times daily. for 7 days       Start: 25     Quantity: 21 tablet Refills: 0                           Ohs Peq Odvv Intake    2025  6:00 PM CDT - Filed by Patient   What is your current physical address in the event of a medical emergency? [address] [place] [address]   Are you able to take your vital signs? No   Please attach any relevant images or files    Is your employer contracted with [facility]? No         Patient doing virtual for shingles flare to right leg  Has painful blisters to back of leg  Gets nerve pain when this flares  States she has increased stress due to being out of her xanax  Symptoms started 2 days ago        Constitution: Positive for activity change.   Skin:  Positive  for rash.   Psychiatric/Behavioral:  Positive for nervous/anxious. The patient is nervous/anxious.         Objective:   The physical exam was conducted virtually.  Physical Exam   Constitutional: She does not appear ill. No distress.   HENT:   Head: Normocephalic and atraumatic.   Nose: Nose normal.   Eyes: Conjunctivae are normal. Right eye exhibits no discharge. Left eye exhibits no discharge.   Pulmonary/Chest: Effort normal. No stridor. No respiratory distress.   Abdominal: Normal appearance.   Neurological: She is alert and at baseline.   Psychiatric: Her behavior is normal. Thought content normal. Her mood appears anxious.       Assessment:     1. Herpes zoster without complication        Plan:       Herpes zoster without complication  -     valACYclovir (VALTREX) 1000 MG tablet; Take 1 tablet (1,000 mg total) by mouth 3 (three) times daily. for 7 days  Dispense: 21 tablet; Refill: 0      Patient upset that I cannot send in pain meds or gabapentin for her shingles pain  I explained I am not able to prescribe controlled medications on this platform    She also requested xanax, I explained I we cannot prescribe it on this platform  I recommended she follow up with her Primary Care Physician                   [1]   Current Outpatient Medications on File Prior to Visit   Medication Sig Dispense Refill    ALPRAZolam (XANAX) 0.5 MG tablet SMARTSI-2 Tablet(s) By Mouth As Directed (Patient not taking: Reported on 2025)      ALPRAZolam (XANAX) 0.5 MG tablet Take 1 tablet (0.5 mg total) by mouth nightly as needed for Insomnia or Anxiety. 30 tablet 0    diethylpropion (TENUATE) 75 mg SR tablet Take 1 tablet (75 mg total) by mouth every morning. 30 tablet 1    famotidine (PEPCID) 40 MG tablet Take 1 tablet (40 mg total) by mouth once daily. 30 tablet 11    gabapentin (NEURONTIN) 100 MG capsule Take 1 capsule (100 mg total) by mouth 3 (three) times daily. for 10 days 30 capsule 0    ketorolac (TORADOL) 10 mg tablet  Take 1 tablet 3 times daily after meals, as needed for pain (Patient not taking: Reported on 4/11/2025) 15 tablet 0    LIDOcaine (LIDODERM) 5 % Place 1 patch onto the skin once daily. Remove & Discard patch within 12 hours or as directed by MD (Patient not taking: Reported on 4/11/2025) 14 patch 0    LIDOcaine viscous HCl 2% (LIDOCAINE VISCOUS) 2 % Soln by Mucous Membrane route every 3 (three) hours. 100 mL 0    meloxicam (MOBIC) 15 MG tablet Take 1 tablet (15 mg total) by mouth once daily. (Patient not taking: Reported on 4/11/2025) 30 tablet 0    mirtazapine (REMERON) 7.5 MG Tab Take 1 tablet (7.5 mg total) by mouth every evening. 30 tablet 11    tiZANidine (ZANAFLEX) 4 MG tablet Take 1 tablet (4 mg total) by mouth every 6 (six) hours as needed (for pain). (Patient not taking: Reported on 4/11/2025) 20 tablet 0    traMADoL (ULTRAM) 50 mg tablet Take 50 mg by mouth daily as needed. (Patient not taking: Reported on 4/11/2025)       No current facility-administered medications on file prior to visit.

## 2025-07-10 ENCOUNTER — OFFICE VISIT (OUTPATIENT)
Dept: URGENT CARE | Facility: CLINIC | Age: 40
End: 2025-07-10
Payer: MEDICAID

## 2025-07-10 VITALS
SYSTOLIC BLOOD PRESSURE: 95 MMHG | RESPIRATION RATE: 18 BRPM | WEIGHT: 220 LBS | HEART RATE: 84 BPM | TEMPERATURE: 98 F | HEIGHT: 66 IN | DIASTOLIC BLOOD PRESSURE: 68 MMHG | BODY MASS INDEX: 35.36 KG/M2 | OXYGEN SATURATION: 98 %
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
## 2025-07-10 DIAGNOSIS — M25.472 PAIN AND SWELLING OF LEFT ANKLE: ICD-10-CM

## 2025-07-10 DIAGNOSIS — L08.9: Primary | ICD-10-CM

## 2025-07-10 DIAGNOSIS — M25.572 PAIN AND SWELLING OF LEFT ANKLE: ICD-10-CM

## 2025-07-10 DIAGNOSIS — S90.562A: Primary | ICD-10-CM

## 2025-07-10 PROCEDURE — 73610 X-RAY EXAM OF ANKLE: CPT | Mod: FY,LT,S$GLB, | Performed by: RADIOLOGY

## 2025-07-10 PROCEDURE — 99214 OFFICE O/P EST MOD 30 MIN: CPT | Mod: S$GLB,,, | Performed by: PHYSICIAN ASSISTANT

## 2025-07-10 RX ORDER — CEPHALEXIN 500 MG/1
500 CAPSULE ORAL EVERY 6 HOURS
Qty: 28 CAPSULE | Refills: 0 | Status: SHIPPED | OUTPATIENT
Start: 2025-07-10 | End: 2025-07-17

## 2025-07-10 RX ORDER — PREDNISONE 20 MG/1
TABLET ORAL
Qty: 6 TABLET | Refills: 0 | Status: SHIPPED | OUTPATIENT
Start: 2025-07-10 | End: 2025-07-14

## 2025-07-10 NOTE — PROGRESS NOTES
"Subjective:      Patient ID: Lindsay Sneed is a 40 y.o. female.    Vitals:  height is 5' 6" (1.676 m) and weight is 99.8 kg (220 lb). Her oral temperature is 97.5 °F (36.4 °C). Her blood pressure is 95/68 and her pulse is 84. Her respiration is 18 and oxygen saturation is 98%.     Chief Complaint: Insect Bite    39yo female presents with intermittent LT ankle and foot swelling and pain, sustained to insect bites while outside on a picnic 6 days ago.   Pt states she noticed a sting/bite that felt like an ant bite and hit it off but did not see if it was an ant. She noticed swelling and redness over the top and lateral surface of the foot with 2 separate evident bite marks on the ankle.Pt believed it was an ant bite because of how itchy it felt the day after onset of swelling. She reports applying alcohol over the bites with complete resolution of itching, but the swelling never went away.  6/10 pain with palpation, worse when it is more swollen.  Swelling has waxed and waned, was worse last night, did run a lot of errands yesterday.  Does not recall injury or trauma, but  states that she did stumble at the picnic and twisted her ankle while wearing high heels.  Pain worse with pressure, but elevating the foot improved the swelling.  No numbness/tingling or drainage from the bite sites.     Insect Bite  This is a new problem. The current episode started in the past 7 days. The problem occurs constantly. The problem has been gradually worsening. Associated symptoms include arthralgias and joint swelling. Pertinent negatives include no abdominal pain, anorexia, change in bowel habit, chest pain, chills, congestion, coughing, diaphoresis, fatigue, fever, headaches, myalgias, nausea, neck pain, numbness, rash, sore throat, swollen glands, urinary symptoms, vertigo, visual change, vomiting or weakness. Nothing aggravates the symptoms. She has tried nothing for the symptoms.       Constitution: Negative for chills, " sweating, fatigue and fever.   HENT:  Negative for congestion, sore throat and trouble swallowing.    Neck: Negative for neck pain and neck stiffness.   Cardiovascular:  Negative for chest pain, leg swelling and palpitations.   Eyes:  Negative for eye itching, eye pain, eye redness and blurred vision.   Respiratory:  Negative for chest tightness, cough, sputum production, shortness of breath, stridor and wheezing.    Gastrointestinal:  Negative for abdominal pain, nausea, vomiting and diarrhea.   Genitourinary:  Negative for dysuria, frequency and urgency.   Musculoskeletal:  Positive for joint pain and joint swelling. Negative for pain and muscle ache.   Skin:  Negative for pale, rash and hives.   Allergic/Immunologic: Negative for hives, itching and sneezing.   Neurological:  Negative for dizziness, history of vertigo, headaches, disorientation, altered mental status, loss of consciousness, numbness and tingling.   Psychiatric/Behavioral:  Negative for altered mental status and disorientation.       Objective:     Physical Exam   Constitutional: She is oriented to person, place, and time. She appears well-developed.  Non-toxic appearance. She does not appear ill. No distress.   HENT:   Head: Normocephalic and atraumatic.   Ears:   Right Ear: Tympanic membrane, external ear and ear canal normal.   Left Ear: Tympanic membrane, external ear and ear canal normal.   Nose: Nose normal.   Mouth/Throat: Oropharynx is clear and moist. Mucous membranes are moist. Oropharynx is clear.   Eyes: Conjunctivae, EOM and lids are normal. Pupils are equal, round, and reactive to light. Right eye exhibits no discharge. Left eye exhibits no discharge. No scleral icterus.   Neck: Trachea normal and phonation normal. Neck supple.   Cardiovascular: Normal rate, regular rhythm, normal heart sounds and normal pulses.   Pulmonary/Chest: Effort normal and breath sounds normal. No stridor. No respiratory distress. She has no wheezes.    Abdominal: Normal appearance.   Musculoskeletal:        Legs:       Right foot: Normal.      Left foot: Decreased range of motion. Normal capillary refill. Tenderness and swelling present. No crepitus.      Comments: Cap refill less than 2 seconds.  PT and DP pulses 2+.   Neurological: She is alert and oriented to person, place, and time.   Skin: Skin is warm, dry, intact and not diaphoretic. Capillary refill takes less than 2 seconds.   Psychiatric: Her speech is normal and behavior is normal. Judgment and thought content normal.   Nursing note and vitals reviewed.    XR ANKLE COMPLETE 3 VIEW LEFT  Result Date: 7/10/2025  EXAMINATION: XR ANKLE COMPLETE 3 VIEW LEFT CLINICAL HISTORY: Pain in left ankle and joints of left foot TECHNIQUE: AP, lateral and oblique views of the left ankle were performed. COMPARISON: None FINDINGS: No acute fracture or dislocation. Alignment is normal. The ankle mortise is congruent. The talar dome is intact. Joint spaces are preserved. No effusion.     No acute osseous abnormality of the ankle. Electronically signed by: Paul Estevez Date:    07/10/2025 Time:    19:06        Assessment:     1. Nonvenomous insect bite of left ankle with infection, initial encounter    2. Pain and swelling of left ankle        Plan:       Nonvenomous insect bite of left ankle with infection, initial encounter  -     predniSONE (DELTASONE) 20 MG tablet; Take 2 tablets (40 mg total) by mouth once daily for 2 days, THEN 1 tablet (20 mg total) once daily for 2 days.  Dispense: 6 tablet; Refill: 0  -     cephALEXin (KEFLEX) 500 MG capsule; Take 1 capsule (500 mg total) by mouth every 6 (six) hours. for 7 days  Dispense: 28 capsule; Refill: 0  -     BANDAGE ELASTIC 4IN ACE NS    Pain and swelling of left ankle  -     XR ANKLE COMPLETE 3 VIEW LEFT; Future; Expected date: 07/10/2025      2 insect bites, itching resolved but waxing and waning swelling that worsened last night Will cover for inflammation and  infection.  Questionable if she had inversion ankle injury, may also be related to sprain.  X-ray is negative.  Low suspicion for septic arthritis.  Monitor closely, follow up with PCP.  Seek medical attention sooner p.r.n. for new or worsening symptoms. Discussed ddx, home care, tx options, and given follow up precautions.  I have reviewed the patient's chart to view previous visits, labs, and imaging to assess PMH and look for any trends or previous treatments.

## 2025-07-11 NOTE — PATIENT INSTRUCTIONS
- Rest.    - Drink plenty of fluids.    - Acetaminophen (tylenol) or Ibuprofen (advil,motrin) as directed as needed for fever/pain. Avoid tylenol if you have a history of liver disease. Do not take ibuprofen if you have a history of GI bleeding, kidney disease, or if you take blood thinners.     -Zyrtec and Benadryl over-the-counter as directed, these are antihistamines    - Ice for 15-20 minutes at a time for the next 24-48 hours.    - Elevate when possible.     - You have been given an antibiotic to treat your condition today.    - Please complete the antibiotic as directed on the bottle.   - If you are female and on oral birth control pills, use additional methods to prevent pregnancy while on antibiotics and for one cycle after.   - you can take otc probiotic to limit upset stomach    - You received a steroid (prednisone) today for swelling/reaction to insect bite.  This can elevate your blood pressure, elevate your blood sugar, water weight gain, nervous energy, redness to the face and dimpling of the skin where the shot goes in.   - Do not use steroids more than 3 times per year.   - If you have diabetes, please check you blood sugar frequently.  - If you have high blood pressure, please check your blood pressure frequently.     - Follow up with your PCP or specialty clinic as directed in the next 1-2 weeks if not improved or as needed.  You can call [phone] to schedule an appointment with the appropriate provider.    - Go to the ER or seek medical attention immediately if you develop new or worsening symptoms.     - You must understand that you have received an Urgent Care treatment only and that you may be released before all of your medical problems are known or treated.   - You, the patient, will arrange for follow up care as instructed.   - If your condition worsens or fails to improve we recommend that you receive another evaluation at the ER immediately or contact your PCP to discuss your  concerns or return here.